# Patient Record
Sex: FEMALE | Race: OTHER | Employment: OTHER | URBAN - METROPOLITAN AREA
[De-identification: names, ages, dates, MRNs, and addresses within clinical notes are randomized per-mention and may not be internally consistent; named-entity substitution may affect disease eponyms.]

---

## 2017-03-04 ENCOUNTER — APPOINTMENT (OUTPATIENT)
Dept: GENERAL RADIOLOGY | Age: 77
End: 2017-03-04
Attending: PHYSICIAN ASSISTANT
Payer: MEDICARE

## 2017-03-04 ENCOUNTER — HOSPITAL ENCOUNTER (EMERGENCY)
Age: 77
Discharge: HOME | End: 2017-03-04
Attending: EMERGENCY MEDICINE
Payer: MEDICARE

## 2017-03-04 ENCOUNTER — APPOINTMENT (OUTPATIENT)
Dept: CT IMAGING | Age: 77
End: 2017-03-04
Attending: PHYSICIAN ASSISTANT
Payer: MEDICARE

## 2017-03-04 VITALS
OXYGEN SATURATION: 92 % | BODY MASS INDEX: 24.51 KG/M2 | SYSTOLIC BLOOD PRESSURE: 115 MMHG | HEART RATE: 74 BPM | WEIGHT: 129.8 LBS | DIASTOLIC BLOOD PRESSURE: 81 MMHG | HEIGHT: 61 IN | TEMPERATURE: 98.4 F | RESPIRATION RATE: 16 BRPM

## 2017-03-04 DIAGNOSIS — I62.9 INTRACRANIAL BLEED (HCC): Primary | ICD-10-CM

## 2017-03-04 LAB
ANION GAP BLD CALC-SCNC: 7 MMOL/L (ref 5–15)
APPEARANCE UR: CLEAR
APTT PPP: 26.4 SEC (ref 22.1–32.5)
BACTERIA URNS QL MICRO: ABNORMAL /HPF
BASOPHILS # BLD AUTO: 0 K/UL (ref 0–0.1)
BASOPHILS # BLD: 0 % (ref 0–1)
BILIRUB UR QL: NEGATIVE
BUN SERPL-MCNC: 23 MG/DL (ref 6–20)
BUN/CREAT SERPL: 23 (ref 12–20)
CALCIUM SERPL-MCNC: 9.9 MG/DL (ref 8.5–10.1)
CHLORIDE SERPL-SCNC: 99 MMOL/L (ref 97–108)
CO2 SERPL-SCNC: 33 MMOL/L (ref 21–32)
COLOR UR: ABNORMAL
CREAT SERPL-MCNC: 0.98 MG/DL (ref 0.55–1.02)
EOSINOPHIL # BLD: 0 K/UL (ref 0–0.4)
EOSINOPHIL NFR BLD: 1 % (ref 0–7)
EPITH CASTS URNS QL MICRO: ABNORMAL /LPF
ERYTHROCYTE [DISTWIDTH] IN BLOOD BY AUTOMATED COUNT: 13 % (ref 11.5–14.5)
GLUCOSE SERPL-MCNC: 95 MG/DL (ref 65–100)
GLUCOSE UR STRIP.AUTO-MCNC: NEGATIVE MG/DL
HCT VFR BLD AUTO: 35.2 % (ref 35–47)
HGB BLD-MCNC: 11.6 G/DL (ref 11.5–16)
HGB UR QL STRIP: NEGATIVE
INR PPP: 1.1 (ref 0.9–1.1)
KETONES UR QL STRIP.AUTO: NEGATIVE MG/DL
LEUKOCYTE ESTERASE UR QL STRIP.AUTO: NEGATIVE
LYMPHOCYTES # BLD AUTO: 20 % (ref 12–49)
LYMPHOCYTES # BLD: 1.3 K/UL (ref 0.8–3.5)
MCH RBC QN AUTO: 30.4 PG (ref 26–34)
MCHC RBC AUTO-ENTMCNC: 33 G/DL (ref 30–36.5)
MCV RBC AUTO: 92.4 FL (ref 80–99)
MONOCYTES # BLD: 0.3 K/UL (ref 0–1)
MONOCYTES NFR BLD AUTO: 5 % (ref 5–13)
NEUTS SEG # BLD: 4.6 K/UL (ref 1.8–8)
NEUTS SEG NFR BLD AUTO: 74 % (ref 32–75)
NITRITE UR QL STRIP.AUTO: NEGATIVE
PH UR STRIP: 8 [PH] (ref 5–8)
PLATELET # BLD AUTO: 344 K/UL (ref 150–400)
POTASSIUM SERPL-SCNC: 3.7 MMOL/L (ref 3.5–5.1)
PROT UR STRIP-MCNC: NEGATIVE MG/DL
PROTHROMBIN TIME: 11.2 SEC (ref 9–11.1)
RBC # BLD AUTO: 3.81 M/UL (ref 3.8–5.2)
RBC #/AREA URNS HPF: ABNORMAL /HPF (ref 0–5)
SODIUM SERPL-SCNC: 139 MMOL/L (ref 136–145)
SP GR UR REFRACTOMETRY: 1.02 (ref 1–1.03)
THERAPEUTIC RANGE,PTTT: NORMAL SECS (ref 58–77)
TROPONIN I SERPL-MCNC: <0.04 NG/ML
UA: UC IF INDICATED,UAUC: ABNORMAL
UROBILINOGEN UR QL STRIP.AUTO: 0.2 EU/DL (ref 0.2–1)
WBC # BLD AUTO: 6.3 K/UL (ref 3.6–11)
WBC URNS QL MICRO: ABNORMAL /HPF (ref 0–4)

## 2017-03-04 PROCEDURE — 80048 BASIC METABOLIC PNL TOTAL CA: CPT | Performed by: PHYSICIAN ASSISTANT

## 2017-03-04 PROCEDURE — 84484 ASSAY OF TROPONIN QUANT: CPT | Performed by: PHYSICIAN ASSISTANT

## 2017-03-04 PROCEDURE — 72125 CT NECK SPINE W/O DYE: CPT

## 2017-03-04 PROCEDURE — 87086 URINE CULTURE/COLONY COUNT: CPT | Performed by: PHYSICIAN ASSISTANT

## 2017-03-04 PROCEDURE — 99285 EMERGENCY DEPT VISIT HI MDM: CPT

## 2017-03-04 PROCEDURE — 36415 COLL VENOUS BLD VENIPUNCTURE: CPT | Performed by: PHYSICIAN ASSISTANT

## 2017-03-04 PROCEDURE — 70486 CT MAXILLOFACIAL W/O DYE: CPT

## 2017-03-04 PROCEDURE — 85610 PROTHROMBIN TIME: CPT | Performed by: PHYSICIAN ASSISTANT

## 2017-03-04 PROCEDURE — 71010 XR CHEST PORT: CPT

## 2017-03-04 PROCEDURE — 93005 ELECTROCARDIOGRAM TRACING: CPT

## 2017-03-04 PROCEDURE — 81001 URINALYSIS AUTO W/SCOPE: CPT | Performed by: PHYSICIAN ASSISTANT

## 2017-03-04 PROCEDURE — 72072 X-RAY EXAM THORAC SPINE 3VWS: CPT

## 2017-03-04 PROCEDURE — 85025 COMPLETE CBC W/AUTO DIFF WBC: CPT | Performed by: PHYSICIAN ASSISTANT

## 2017-03-04 PROCEDURE — 85730 THROMBOPLASTIN TIME PARTIAL: CPT | Performed by: PHYSICIAN ASSISTANT

## 2017-03-04 PROCEDURE — 70450 CT HEAD/BRAIN W/O DYE: CPT

## 2017-03-04 RX ORDER — SODIUM CHLORIDE 0.9 % (FLUSH) 0.9 %
5-10 SYRINGE (ML) INJECTION EVERY 8 HOURS
Status: DISCONTINUED | OUTPATIENT
Start: 2017-03-04 | End: 2017-03-04 | Stop reason: HOSPADM

## 2017-03-04 RX ORDER — ASCORBIC ACID 500 MG
500 TABLET ORAL DAILY
COMMUNITY
End: 2022-08-24

## 2017-03-04 RX ORDER — PAROXETINE 10 MG/1
20 TABLET, FILM COATED ORAL DAILY
COMMUNITY
End: 2022-08-24

## 2017-03-04 RX ORDER — SODIUM CHLORIDE 0.9 % (FLUSH) 0.9 %
5-10 SYRINGE (ML) INJECTION AS NEEDED
Status: DISCONTINUED | OUTPATIENT
Start: 2017-03-04 | End: 2017-03-04 | Stop reason: HOSPADM

## 2017-03-04 NOTE — ED NOTES
MD reviewed discharge instructions with the patient. The patient verbalized understanding. Patient ambulated out of ED accompanied by daughter, reporting being relieved of most intense pain.

## 2017-03-04 NOTE — H&P
[address] [place] [facility] [address]   HISTORY AND PHYSICAL       Name:  Michelle Rehman   MR#:  [id number]   :  1940   Account #:  [de-identified]        Date of Adm:  2017       PRIMARY CARE PHYSICIAN: Yo Zapata MD    SOURCE OF INFORMATION: The patient. CHIEF COMPLAINT: Fall. HISTORY OF PRESENT ILLNESS: This is a 75-year-old woman with   a past medical history significant for hypertension, dyslipidemia, type 2   diabetes, who was in her usual state of health until Thursday when the   patient fell at home. The patient stated that she tripped and fell. The   fall was accidental. She hit her head in the process of the fall. She also   stated that she passed out by the fall. The patient did not seek medical   treatment until today following the fall. Following the fall, the patient   developed neck pain as well as swelling around the left eye and left   side of the forehead. The neck pain is constant, it is persistent, 7/10 in   severity. It is worse with movement of the neck. No known relieving   factors. The patient was brought to the emergency room today for   further evaluation. When the patient arrived at the emergency room,   CT scan of the cervical spine was obtained and this was negative for   acute pathology, but the CT scan of the head shows a small left   subdural hematoma. The emergency room provider consulted the   neurosurgeon on-call. The neurosurgeon advised observation in the   hospital and to repeat the CT scan of the head tomorrow. The patient   was referred to the hospitalist service for placement on observation. The patient has no fever, no rigors, no chills. PAST MEDICAL HISTORY: Hypertension, type 2 diabetes,   dyslipidemia. ALLERGIES: NO KNOWN DRUG ALLERGIES. MEDICATIONS   1. Vitamin C 500 mg daily. 2. Calcium carbonate 600 mg daily. 3. Motrin 300 mg every 6 hours as needed for pain. 4. Glucophage 500 mg 3 times daily.    5. Paxil 10 mg daily. 6. Altace 2.5 mg daily. 6. Crestor 2.5 mg daily on Monday, Tuesday, Thursday and Friday. 7. Maxzide 37.5/25, 1 tablet daily. 8. Verapamil 180 mg daily. FAMILY HISTORY: This was reviewed. No family history of brain   aneurysm. PAST SURGICAL HISTORY: Not significant. SOCIAL HISTORY: No history of alcohol or tobacco abuse. REVIEW OF SYSTEMS   HEAD/EYES/EARS/NOSE/THROAT: This is positive for headache,   neck pain. No blurring of vision. No photophobia. RESPIRATORY: No cough, no shortness of breath, no hemoptysis. CARDIOVASCULAR: No chest pain, no orthopnea, no palpitation. GASTROINTESTINAL: No nausea or vomiting, no diarrhea, no   constipation. GENITOURINARY: No dysuria, no urgency and no frequency. All other systems are reviewed and they are negative. PHYSICAL EXAMINATION   GENERAL APPEARANCE: The patient appeared ill, in moderate   distress. VITAL SIGNS: On arrival at the emergency room, temperature 98.2,   pulse 70, respiratory rate 16, blood pressure 134/77, oxygen saturation   98% on room air. HEAD: Normocephalic. Left frontal hematoma and bruises around the   left eye are noted. Normal eye movement. No drainage, no discharge. EARS: Normal external ears with no obvious drainage. NOSE: No deformity, no drainage. MOUTH AND THROAT: No visible oral lesion. NECK: Supple. No JVD, no thyromegaly. CHEST: Clear breath sounds. No wheezing, no crackles. HEART: Normal S1 and S2, regular. No clinically appreciable murmur. ABDOMEN: Soft, nontender, normal bowel sounds. CENTRAL NERVOUS SYSTEM: Alert, oriented x3. No gross focal   neurological deficits. EXTREMITIES: No edema. Pulses 2+ bilaterally. MUSCULOSKELETAL: No obvious joint deformity or swelling. SKIN:   Left frontal hematoma bruises around the left eye noted. PSYCHIATRIC: Normal mood and affect. LYMPHATIC: No cervical lymphadenopathy. DIAGNOSTIC DATA: Chest x-ray negative.      X-ray of the thoracic spine negative for fracture. CT scan of the face shows a left frontal hematoma. CT scan of the cervical spine, no fracture. CT scan of the head shows small subdural hematoma. EKG shows normal sinus rhythm. No ST and T-wave abnormalities. LABORATORY DATA: Urinalysis: This is significant for negative   nitrites, negative leukocyte esterase, 1+ bacteria. CHEMISTRY:   Sodium 139, potassium 3.7, chloride 99, CO2 33, glucose 95, BUN 23,   creatinine 0.98, calcium 9.9. CARDIAC PROFILE: Troponin less than   0.04. INR 1.1, PT 11.2, PTT 26.4. HEMATOLOGY: WBC 6.3,   hemoglobin 11.6, hematocrit 35.2, platelet count 180. ASSESSMENT   1. Subdural hematoma. 2. Hypertension. 3. Dyslipidemia. 4. Bacteriuria. 5. Left frontal scalp hematoma. 6. Type 2 diabetes. 7. Fall at home. PLAN   1. Subdural hematoma. We will place the patient on observation as   advised by the neurosurgeon. We will repeat a CT scan of the head   tomorrow morning. We will await further recommendation from the   neurosurgeon. 2. Hypertension. We will resume her preadmission medication. We will   monitor the patient's blood pressure closely. 3. Dyslipidemia. We will resume her home medications. 4. Bacteriuria. We will await urine culture. The patient is asymptomatic. 5. Left frontal scalp hematoma. We will carry out supportive therapy   including ice pack for the swelling. 6. Type 2 diabetes. We will place the patient on sliding scale with   insulin coverage. We will check hemoglobin A1c level. We will hold oral   medications until the time of discharge. 7. Fall at home. We will obtain a CT scan of the pelvis to evaluate for   fracture. OTHER ISSUES: CODE STATUS: THE PATIENT IS A FULL CODE. We will request SCDs for DVT prophylaxis.          MD ESSIE Charles / Kartik   D:  03/04/2017   14:02   T:  03/04/2017   15:13   Job #:  [id number]

## 2017-03-04 NOTE — CONSULTS
[address] [place] [facility] [place] [address]   0 [facility]       Name:  Shanelle Llamas   MR#:  [id number]   :  1940   Account #:  [de-identified]    Date of Consultation:  2017   Date of Adm:  2017       REQUESTING PHYSICIAN: María Elena Min. Maricel Anguiano MD, Emergency Room. HISTORY OF PRESENT ILLNESS: This 77-year-old woman in her   usual state of otherwise, good health until 3 days ago when she had a   syncopal or near-syncopal episode, missed the last step on a   staircase, fell down and struck the left side of her head. When   the cephalohematoma or black and blue nathan that she had over the left   orbit became worse after 3 days, the family insisted that she come go   to the emergency room. Initially, she had refused. PAST MEDICAL HISTORY: Diabetes, diverticulitis,   hypercholesterolemia, hypertension, osteopenia. PAST SURGICAL HISTORY: She has had a cholecystectomy, ENT   surgery and colonoscopy. SOCIAL HISTORY: She is a former smoker. No alcohol use. ALLERGIES: NO KNOWN DRUG ALLERGIES. PHYSICAL EXAMINATION   GENERAL: She is awake and alert, oriented to person, place and time. Swiss is her primary language, but her daughter is here to help   interpret. VITAL SIGNS: Temperature 98.2, pulse rate 70, respiratory rate 16,   blood pressure when she was admitted was 155/122. It came down to 122/68, O2 saturation is 98%. NEUROLOGIC: She is awake, alert, oriented to person, place, and   time. Cranial nerve function 2 through 12 normal with a fair amount of   periorbital ecchymosis around the left eye. There is no evidence of   otorrhea or rhinorrhea. Face is symmetric. Palate rises equally. Tongue protrudes midline. She moves all 4 extremities equally. No   sensory deficits. No palpable tenderness in the posterior cervical   region.     IMAGING STUDIES: Include a CT scan of the brain that showed a tiny   amount of blood over the left convexity of the parietal lobe with no   mass effect, no hydrocephalus, no intracerebral injuries. IMPRESSION: Closed head injury. She fell 3 days ago. At this point, I   think it would be okay to send her home since the injury is 1days old   and she not gotten worse. The only reason she came to the hospital   today was because the black and blue nathan was getting worse and I   explained to the family that this is not unusual as these things tend to   look worse over time before they improve. I gave her my business   card. She knows to call the office for a followup visit in about 1-2   weeks. If she does get admitted, then I will follow as necessary, but   there is no neurosurgical intervention necessary at this time.         MD Krish Rowland / Johanny.Zaida   D:  03/04/2017   13:50   T:  03/04/2017   14:53   Job #:  [id number]

## 2017-03-04 NOTE — DISCHARGE INSTRUCTIONS
We hope that we have addressed all of your medical concerns. The examination and treatment you received in the Emergency Department were for an emergent problem and were not intended as complete care. It is important that you follow up with your healthcare provider(s) for ongoing care. If your symptoms worsen or do not improve as expected, and you are unable to reach your usual health care provider(s), you should return to the Emergency Department. Today's healthcare is undergoing tremendous change, and patient satisfaction surveys are one of the many tools to assess the quality of medical care. You may receive a survey from the [employer] regarding your experience in the Emergency Department. I hope that your experience has been completely positive, particularly the medical care that I provided. As such, please participate in the survey; anything less than excellent does not meet my expectations or intentions. [facility] and 8 [facility] participate in nationally recognized quality of care measures. If your blood pressure is greater than 120/80, as reported below, we urge that you seek medical care to address the potential of high blood pressure, commonly known as hypertension. Hypertension can be hereditary or can be caused by certain medical conditions, pain, stress, or \"white coat syndrome. \"       Please make an appointment with your health care provider(s) for follow up of your Emergency Department visit. VITALS:   Patient Vitals for the past 8 hrs:   Temp Pulse Resp BP SpO2   03/04/17 1315 - - - 122/68 96 %   03/04/17 1300 - - - 129/55 97 %   03/04/17 1230 - - - 137/77 -   03/04/17 1145 - - - 152/66 95 %   03/04/17 1130 - - - 131/88 (!) 89 %   03/04/17 1124 - - - 155/83 -   03/04/17 1003 98.2 °F (36.8 °C) 70 16 134/77 98 %          Thank you for allowing us to provide you with medical care today.   We realize that you have many choices for your emergency care needs. Please choose us in the future for any continued health care needs. Chago Yates Michelle [facility], [facility].   Office: [phone]            Recent Results (from the past 24 hour(s))   CBC WITH AUTOMATED DIFF    Collection Time: 03/04/17 12:34 PM   Result Value Ref Range    WBC 6.3 3.6 - 11.0 K/uL    RBC 3.81 3.80 - 5.20 M/uL    HGB 11.6 11.5 - 16.0 g/dL    HCT 35.2 35.0 - 47.0 %    MCV 92.4 80.0 - 99.0 FL    MCH 30.4 26.0 - 34.0 PG    MCHC 33.0 30.0 - 36.5 g/dL    RDW 13.0 11.5 - 14.5 %    PLATELET 724 736 - 228 K/uL    NEUTROPHILS 74 32 - 75 %    LYMPHOCYTES 20 12 - 49 %    MONOCYTES 5 5 - 13 %    EOSINOPHILS 1 0 - 7 %    BASOPHILS 0 0 - 1 %    ABS. NEUTROPHILS 4.6 1.8 - 8.0 K/UL    ABS. LYMPHOCYTES 1.3 0.8 - 3.5 K/UL    ABS. MONOCYTES 0.3 0.0 - 1.0 K/UL    ABS. EOSINOPHILS 0.0 0.0 - 0.4 K/UL    ABS.  BASOPHILS 0.0 0.0 - 0.1 K/UL   METABOLIC PANEL, BASIC    Collection Time: 03/04/17 12:34 PM   Result Value Ref Range    Sodium 139 136 - 145 mmol/L    Potassium 3.7 3.5 - 5.1 mmol/L    Chloride 99 97 - 108 mmol/L    CO2 33 (H) 21 - 32 mmol/L    Anion gap 7 5 - 15 mmol/L    Glucose 95 65 - 100 mg/dL    BUN 23 (H) 6 - 20 MG/DL    Creatinine 0.98 0.55 - 1.02 MG/DL    BUN/Creatinine ratio 23 (H) 12 - 20      GFR est AA >60 >60 ml/min/1.73m2    GFR est non-AA 55 (L) >60 ml/min/1.73m2    Calcium 9.9 8.5 - 10.1 MG/DL   URINALYSIS W/ REFLEX CULTURE    Collection Time: 03/04/17 12:34 PM   Result Value Ref Range    Color YELLOW/STRAW      Appearance CLEAR CLEAR      Specific gravity 1.019 1.003 - 1.030      pH (UA) 8.0 5.0 - 8.0      Protein NEGATIVE  NEG mg/dL    Glucose NEGATIVE  NEG mg/dL    Ketone NEGATIVE  NEG mg/dL    Bilirubin NEGATIVE  NEG      Blood NEGATIVE  NEG      Urobilinogen 0.2 0.2 - 1.0 EU/dL    Nitrites NEGATIVE  NEG      Leukocyte Esterase NEGATIVE  NEG      WBC 0-4 0 - 4 /hpf    RBC 0-5 0 - 5 /hpf    Epithelial cells FEW FEW /lpf    Bacteria 1+ (A) NEG /hpf    UA:UC IF INDICATED URINE CULTURE ORDERED (A) CNI     PROTHROMBIN TIME + INR    Collection Time: 03/04/17 12:34 PM   Result Value Ref Range    INR 1.1 0.9 - 1.1      Prothrombin time 11.2 (H) 9.0 - 11.1 sec   PTT    Collection Time: 03/04/17 12:34 PM   Result Value Ref Range    aPTT 26.4 22.1 - 32.5 sec    aPTT, therapeutic range     58.0 - 77.0 SECS   TROPONIN I    Collection Time: 03/04/17 12:34 PM   Result Value Ref Range    Troponin-I, Qt. <0.04 <0.05 ng/mL   EKG, 12 LEAD, INITIAL    Collection Time: 03/04/17 12:41 PM   Result Value Ref Range    Ventricular Rate 72 BPM    Atrial Rate 72 BPM    P-R Interval 176 ms    QRS Duration 64 ms    Q-T Interval 376 ms    QTC Calculation (Bezet) 411 ms    Calculated P Axis 33 degrees    Calculated R Axis 16 degrees    Calculated T Axis 25 degrees    Diagnosis       Normal sinus rhythm  When compared with ECG of 05-OCT-2010 15:15,  No significant change was found         Xr Spine Thorac 3 V    Result Date: 3/4/2017  CLINICAL HISTORY:  Trauma, back pain evaluate for compression fracture INDICATION:  back pain FINDINGS: Three views of the thoracic spine are obtained. The vertebral bodies are anatomically aligned. Visualized osseous structures are without evidence of fracture-dislocation. There is no significant soft tissue abnormality identified. Minimal multilevel loss of disc height. Status post cholecystectomy. IMPRESSION: No fracture. Ct Head Wo Cont    Result Date: 3/4/2017  EXAM:  CT HEAD WO CONT INDICATION:   fall pain injury COMPARISON: None. TECHNIQUE: Unenhanced CT of the head was performed using 5 mm images. Brain and bone windows were generated. CT dose reduction was achieved through use of a standardized protocol tailored for this examination and automatic exposure control for dose modulation. FINDINGS: The ventricles and sulci are normal in size, shape and configuration and midline.  There is no significant white matter disease. There is a 7 mm area of hyperdensity along the left parietal lobe measuring 7 mm on series 2 image 19 and coronal image 54. There is no other intracranial hemorrhage, mass, mass effect or midline shift. The basilar cisterns are open. No acute infarct is identified. The bone windows demonstrate no abnormalities. The visualized portions of the paranasal sinuses and mastoid air cells are clear. There is a left frontal scalp hematoma. IMPRESSION: 1. Left frontal scalp hematoma. 2. Small focus of extra-axial hemorrhage adjacent to the left parietal lobe. The findings were called to Dr. Dario Lai on 3/4/2017 at 21 [phone] by myself. [address]    Result Date: 3/4/2017  EXAM:  CT MAXILLOFACIAL WO CONT INDICATION:   fall with pain COMPARISON:  None. CONTRAST:   None. TECHNIQUE:  Multislice helical CT of the facial bones was performed in the axial plane without intravenous contrast administration. Coronal and sagittal reformations were generated. CT dose reduction was achieved through use of a standardized protocol tailored for this examination and automatic exposure control for dose modulation. FINDINGS: There is a mild left frontal scalp hematoma. No acute fracture or dislocation. Degenerative changes are seen in the cervical spine. The visualized paranasal sinuses and mastoid air cells are clear. The globes, optic nerves and extraocular muscles are normal. No abnormalities are identified within the visualized portions of the brain or nasopharynx. Hearing aids are in place. IMPRESSION: Mild left frontal scalp hematoma     Ct Spine Cerv Wo Cont    Result Date: 3/4/2017  EXAM:  CT CERVICAL SPINE WITHOUT CONTRAST INDICATION:   fall with neck pain. COMPARISON: None. TECHNIQUE: Multislice helical CT of the cervical spine was performed without intravenous contrast administration. Sagittal and coronal reconstructions were generated.   CT dose reduction was achieved through use of a standardized protocol tailored for this examination and automatic exposure control for dose modulation. CONTRAST: None. FINDINGS: The alignment is within normal limits. There is no fracture or subluxation. The odontoid process is intact. The craniocervical junction is within normal limits. The prevertebral soft tissues are within normal limits. C2-C3:  There is no spinal canal or neural foraminal stenosis. C3-C4:  Mild disc space narrowing. Severe left neural foraminal narrowing. No right neural foraminal narrowing or spinal stenosis. C4-C5:  Moderate disc space narrowing. Mild broad-based disc osteophyte complex causing mild spinal stenosis. Moderate left and mild right neural foraminal narrowing. C5-C6:  Moderate disc space narrowing. Mild broad-based disc osteophyte complex causing mild spinal stenosis. Moderate bilateral neural foraminal narrowing. C6-C7: Moderate disc space narrowing. Mild right neural foraminal narrowing. C7-T1: There is no spinal canal or neural foraminal narrowing. IMPRESSION: Multilevel spondylosis. No acute fracture or subluxation. Xr Chest Port    Result Date: 3/4/2017  Chest portable AP History: Chest pain. Fall. Comparison: 10/5/2010 Findings: The lungs are well expanded. No focal consolidation, pleural effusion, or pneumothorax. The cardiomediastinal silhouette is unremarkable. The visualized osseous structures are unremarkable. Impression: No acute cardiopulmonary process.

## 2017-03-04 NOTE — PROGRESS NOTES
Full note dictated  Tiny amt of blood over left parietal convexity   No surgery needed  Repeat CT tomorrow, will follow as needed

## 2017-03-04 NOTE — ED TRIAGE NOTES
Triage note: Pt states she tripped and fell into the wall and hit her head. Pt states she passed out after the fall. Pt complains of neck pain and has bruising to the left eye and forehead. Pt fell on Thursday. C-collar placed on pt in triage.

## 2017-03-04 NOTE — PROGRESS NOTES
Prior to Admission Medications   Prescriptions Last Dose Informant Patient Reported? Taking? CALCIUM CARBONATE/VITAMIN D3 (CALTRATE 600 + D PO) 3/4/2017 at 0830  Yes Yes   Sig: Take 600 mg by mouth daily. HK-GP-PI-Fe-Min-Lycopen-Lutein (CENTRUM) 0.4-162-18 mg Tab Not Taking at Unknown time  Yes No   Sig: Take  by mouth. PARoxetine (PAXIL) 10 mg tablet 3/4/2017 at 0830  Yes Yes   Sig: Take 10 mg by mouth daily. ascorbic acid, vitamin C, (VITAMIN C) 500 mg tablet 3/4/2017 at 0830  Yes Yes   Sig: Take 500 mg by mouth daily. ibuprofen (ADVIL) 200 mg tablet 2/25/2017 at Unknown time  Yes Yes   Sig: Take 200 mg by mouth every six (6) hours as needed for Pain.   metFORMIN (GLUCOPHAGE) 500 mg tablet 3/4/2017 at 0830  No Yes   Sig: take 1 tablet by mouth three times a day with food   ramipril (ALTACE) 2.5 mg capsule 3/4/2017 at 0830  Yes Yes   Sig: Take  by mouth daily. rosuvastatin (CRESTOR) 5 mg tablet 2/25/2017 at Unknown time  Yes Yes   Sig: Take 2.5 mg by mouth every Monday, Tuesday, Thursday, Saturday. triamterene-hydrochlorothiazide (MAXZIDE) 37.5-25 mg per tablet 3/4/2017 at 0830  No Yes   Sig: take 1 tablet by mouth once daily   verapamil CR (VERELAN) 180 mg CR capsule 3/4/2017 at 0830  No Yes   Sig: Take 1 Cap by mouth daily. Facility-Administered Medications: None   Self: List updated per patient interview. Also call to patients pharmacy for paxil dosage. Paxil and vitamin c added. Aleve removed.

## 2017-03-05 LAB
ATRIAL RATE: 72 BPM
BACTERIA SPEC CULT: NORMAL
CALCULATED P AXIS, ECG09: 33 DEGREES
CALCULATED R AXIS, ECG10: 16 DEGREES
CALCULATED T AXIS, ECG11: 25 DEGREES
CC UR VC: NORMAL
DIAGNOSIS, 93000: NORMAL
P-R INTERVAL, ECG05: 176 MS
Q-T INTERVAL, ECG07: 376 MS
QRS DURATION, ECG06: 64 MS
QTC CALCULATION (BEZET), ECG08: 411 MS
SERVICE CMNT-IMP: NORMAL
VENTRICULAR RATE, ECG03: 72 BPM

## 2017-03-05 NOTE — ED PROVIDER NOTES
Patient is a 64 y.o. female presenting with fall, head injury, and neck pain. The history is provided by the patient. The history is limited by a language barrier. A  was used (daughter, who reports she interprets for Cherl Grain, She and mother are comfortable with use of her as ). Fall   Incident onset: Thursday night, 1.5 days ago. Fall occurred: while going down carpeted steps, Distance fallen: 3 steps. Impact surface: flat wall. There was no blood loss. Point of impact: left forehead. Pain location: frontal head, c-spine and upper thoracic. The pain is mild. She was ambulatory at the scene. There was no entrapment after the fall. There was no drug use involved in the accident. There was no alcohol use involved in the accident. Associated symptoms include nausea and loss of consciousness. Pertinent negatives include no visual change, no fever, no numbness, no abdominal pain, no bowel incontinence, no vomiting, no hematuria, no headaches, no extremity weakness, no hearing loss, no tingling and no laceration. The risk factors include being elderly. Exacerbated by: moving back/neck. She has tried NSAIDs and acetaminophen for the symptoms. The treatment provided no relief. The patient's last tetanus shot was less than 5 years (before going to [place]) ago. Head Injury    Pertinent negatives include no numbness, no vomiting and no weakness. Neck Pain    Pertinent negatives include no photophobia, no visual change, no chest pain, no numbness, no headaches, no bowel incontinence, no tingling and no weakness. Pt reports having a witnessed fall by her grandchildren's , she states she was going down carpeted steps when she tripped due to her slippers (per  report) and fell down 3 steps, striking left front of face/head to wall with LOC for 5-6 seconds.  Since she's had minimal nausea, no vomiting, no mentation/hearing changes (pt is [place]), no extremity weakness/numbness/loss of strength or ROM. No urinary symptoms, chest pain, sob, abdominal pain. No other concerns. Past Medical History:   Diagnosis Date    Diabetes ([address]     Diverticulitis of colon     Hypercholesterolemia     Hypertension     Osteopenia        Past Surgical History:   Procedure Laterality Date    ENDOSCOPY, COLON, DIAGNOSTIC  2012    small polyp    HX CHOLECYSTECTOMY      HX GI  2012    EGD gastric polyp    HX HEENT  5/2003    cat. ext. O.S. - bilateral         Family History:   Problem Relation Age of Onset    Kidney Disease Mother      renal failure    Stroke Mother     Heart Disease Mother     Hypertension Mother     Lung Disease Father        Social History     Social History    Marital status:      Spouse name: N/A    Number of children: N/A    Years of education: N/A     Occupational History    Not on file. Social History Main Topics    Smoking status: Former Smoker    Smokeless tobacco: Not on file      Comment: former cigarette smoker - quit 45 yrs ago    Alcohol use Yes      Comment: rarely    Drug use: Not on file    Sexual activity: Not on file     Other Topics Concern    Not on file     Social History Narrative         ALLERGIES: Review of patient's allergies indicates no known allergies. Review of Systems   Constitutional: Negative for appetite change, chills, diaphoresis, fatigue and fever. HENT: Negative. Negative for congestion, dental problem, ear discharge, ear pain, nosebleeds, rhinorrhea, sinus pressure, sore throat and trouble swallowing. Eyes: Negative for photophobia, pain, redness, itching and visual disturbance. Respiratory: Negative for cough, chest tightness and shortness of breath. Cardiovascular: Negative for chest pain, palpitations and leg swelling. Gastrointestinal: Positive for nausea. Negative for abdominal pain, bowel incontinence, diarrhea and vomiting. Genitourinary: Negative for difficulty urinating and hematuria. Musculoskeletal: Positive for back pain and neck pain. Negative for extremity weakness. Skin: Positive for color change. Neurological: Positive for loss of consciousness. Negative for dizziness, tingling, tremors, seizures, syncope, facial asymmetry, speech difficulty, weakness, light-headedness, numbness and headaches. Hematological: Negative. Psychiatric/Behavioral: Negative for agitation, behavioral problems and decreased concentration. All other systems reviewed and are negative. Vitals:    03/04/17 1230 03/04/17 1300 03/04/17 1315 03/04/17 1345   BP: 137/77 129/55 122/68 115/81   Pulse:    74   Resp:    16   Temp:    98.4 °F (36.9 °C)   SpO2:  97% 96% 92%   Weight:       Height:                Physical Exam   Constitutional: She is oriented to person, place, and time. She appears well-developed and well-nourished. No distress. 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 Right Ear: Tympanic membrane and ear canal normal.   Left Ear: Tympanic membrane and ear canal normal.   Nose: Nose normal. No nasal deformity, septal deviation or nasal septal hematoma. No epistaxis. Mouth/Throat: Uvula is midline, oropharynx is clear and moist and mucous membranes are normal.   Eyes: EOM are normal. Right eye exhibits no discharge. Left eye exhibits no discharge. Right conjunctiva is not injected. Right conjunctiva has no hemorrhage. Left conjunctiva is not injected. Left conjunctiva has no hemorrhage. Pupils equal and reactive to light but left questionable more sluggish, otherwise normal. No hyphema. Neck: Normal range of motion. Neck supple. No thyromegaly present. Cardiovascular: Normal rate, regular rhythm and normal heart sounds. Pulmonary/Chest: Effort normal and breath sounds normal. No stridor. No respiratory distress. She has no wheezes. Abdominal: Soft. She exhibits no distension. There is no tenderness.  There is no rebound and no guarding. Musculoskeletal:        Cervical back: She exhibits tenderness, bony tenderness and pain. She exhibits normal range of motion, no edema and no deformity. Thoracic back: She exhibits tenderness and pain. She exhibits normal range of motion and no bony tenderness. Pain and ttp along C7, then paraspinal ttp R>L through mid thoracic. BLE/BUE with grossly normal ROM/strength/sensation. Pt ambulates self easily. Lymphadenopathy:     She has no cervical adenopathy. Neurological: She is alert and oriented to person, place, and time. No cranial nerve deficit. She exhibits normal muscle tone. Coordination normal.   Skin: Skin is warm and dry. No laceration noted. She is not diaphoretic. Psychiatric: She has a normal mood and affect. Her behavior is normal.   Nursing note and vitals reviewed. [facility]  ED Course       Procedures    Discussed patient including complaint, history, physical exam, test results and diagnosis and plan including treatment with attending physician. Attending agrees with care and plan. Will call neurosurgery. Neurosurgery states not surgical but would recommend hospitalist admission with neurosurg consult. Additional imaging/labs have been ordered for [facility] agrees to neurosurg recommendations, will admit. Neurosurg feels pt is doing very well 1.5 days after fall and okay with d/c. Family and hospitalist aware and in agreement. Pt will f/u with neurosurg per his recommendations. Patient has been reevaluated and is ready for D/C. The results have been reviewed with them. Patient and/or family have verbally conveyed their understanding and agreement of the patient's signs, symptoms, diagnosis, treatment and prognosis and additionally agree to follow up as recommended or return to the Emergency Room should their condition change prior to follow-up.   Discharge instructions have also been provided to the patient with some educational information regarding their diagnosis as well a list of reasons why they would want to return to the ER prior to their follow-up appointment should their condition change.

## 2017-03-23 ENCOUNTER — HOSPITAL ENCOUNTER (OUTPATIENT)
Dept: CT IMAGING | Age: 77
Discharge: HOME | End: 2017-03-23
Attending: NEUROLOGICAL SURGERY
Payer: MEDICARE

## 2017-03-23 DIAGNOSIS — S09.90XA CLOSED HEAD INJURY: ICD-10-CM

## 2017-03-23 PROCEDURE — 70450 CT HEAD/BRAIN W/O DYE: CPT

## 2017-03-28 ENCOUNTER — HOSPITAL ENCOUNTER (OUTPATIENT)
Dept: PHYSICAL THERAPY | Age: 77
Discharge: HOME | End: 2017-03-28
Payer: MEDICARE

## 2017-03-28 PROCEDURE — 97140 MANUAL THERAPY 1/> REGIONS: CPT | Performed by: PHYSICAL THERAPIST

## 2017-03-28 PROCEDURE — 97161 PT EVAL LOW COMPLEX 20 MIN: CPT | Performed by: PHYSICAL THERAPIST

## 2017-03-28 PROCEDURE — G8984 CARRY CURRENT STATUS: HCPCS | Performed by: PHYSICAL THERAPIST

## 2017-03-28 PROCEDURE — 97110 THERAPEUTIC EXERCISES: CPT | Performed by: PHYSICAL THERAPIST

## 2017-03-28 PROCEDURE — G8985 CARRY GOAL STATUS: HCPCS | Performed by: PHYSICAL THERAPIST

## 2017-03-28 NOTE — PROGRESS NOTES
[facility]  [address]  [place], [address]  Phone: [phone]  Fax: [phone]    Plan of Care/Statement of Necessity for Physical Therapy Services  2-15    Patient name: Evelin Tee  : 1940  Provider#: [id number]  Referral source: Sona Watkins MD      Medical/Treatment Diagnosis: Neck pain [M54.2]     Prior Hospitalization: see medical history     Comorbidities: see evaluation  Prior Level of Function: see evaluation  Medications: Verified on Patient Summary List  Start of Care: 3/28/17      Onset Date: 3/9/17   The Plan of Care and following information is based on the information from the initial evaluation. Assessment/ key information: Pt is a 64year old female presenting with signs and symptoms consistent with cervical strain s/p fall on 3/9/17.     Evaluation Complexity History MEDIUM  Complexity : 1-2 comorbidities / personal factors will impact the outcome/ POC ; Examination LOW Complexity : 1-2 Standardized tests and measures addressing body structure, function, activity limitation and / or participation in recreation  ;Presentation LOW Complexity : Stable, uncomplicated  ;Clinical Decision Making LOW Complexity : FOTO score of   Overall Complexity Rating: LOW     Problem List: pain affecting function, decrease ROM, decrease strength, impaired gait/ balance, decrease ADL/ functional abilitiies, decrease activity tolerance, decrease flexibility/ joint mobility and decrease transfer abilities   Treatment Plan may include any combination of the following: Therapeutic exercise, Therapeutic activities, Neuromuscular re-education, Physical agent/modality, Manual therapy, Patient education, Self Care training and Functional mobility training  Patient / Family readiness to learn indicated by: asking questions, trying to perform skills and interest  Persons(s) to be included in education: patient (P)  Barriers to Learning/Limitations: None  Patient Goal (s): see evaluation  Patient Self Reported Health Status: good  Rehabilitation Potential: good    Short Term Goals: To be accomplished in 2-3 weeks:  1) Pt will be independent with initial HEP  2) Pt will report 25% decrease in exacerbation of symptoms  3) Pt will report use of ice at home to decrease inflammation/pain    Long Term Goals: To be accomplished in 8-12 weeks:  1) Pt will perform resisted shoulder flex, scap without pain in order to lift grocery bags  2) Pt will report being able to lift grandchildren without pain in order to return to prior level of function  3) Pt will perform neck AROM all directions without pain in order to return to prior level of function    Frequency / Duration: Patient to be seen 1-2 times per week for 8-12 weeks. Patient/ Caregiver education and instruction: self care, activity modification and exercises    [x]  Plan of care has been reviewed with [facility]    G-Codes (GP)  Carry   Current  CJ= 20-39%    Goal  CJ= 20-39%      The severity rating is based on clinical judgment and the FOTO Score score. Certification Period: 3/28/17- 6/19/17  Pooja Pineda, PT 3/28/2017 8:34 AM    ________________________________________________________________________    I certify that the above Therapy Services are being furnished while the patient is under my care. I agree with the treatment plan and certify that this therapy is necessary.     [de-identified] Signature:____________________  Date:____________Time: _________

## 2017-03-28 NOTE — PROGRESS NOTES
[facility]  [address], [place], [address]  Phone: [phone]  Fax: [phone]    PT INITIAL EVALUATION NOTE - [facility] 2-15    Patient Name: Philippe Parker  Date:3/28/2017  : 1940  [x]  Patient  Verified  Payor: Anthony Ee / Plan: [place] MEDICARE PART A & B / Product Type: Medicare /    In time:8:30 AM  Out time:9:30 AM  Total Treatment Time (min): 60  Total Timed Codes (min): 25  1:1 Treatment Time ( [address] only): 50   Visit #: 1     Treatment Area: Neck pain [M54.2]    Subjective: Any medication changes, allergies to medications, adverse drug reactions, diagnosis change, or new procedure performed?: [] No    [x] Yes (see summary    Date of onset/injury:   Pt presents with neck pain. She fell down the stairs on 3/9/17 and hit her head- she reports losing consciousness. She went to hospital and had CT scan- per CC: 1. Left frontal scalp hematoma 2. Small focus of extra-axial hemorrhage adjacent to the left parietal lobe. She denies onset of dizziness or memory loss since the fall. She denies headaches, however reports \"pain\" in the back of her head. She has pain in neck, both shoulders. Denies UE numbness or tingling. No history of neck pain prior to falling. Most recent CT scan on 3/18/17, per CC: Resolving left frontal scalp hematoma and small left subdural hematoma. Pain:   10/10 max 5/10 min 6/10 now       Aggravated by: lifting, moving head   Eased by: Advil, massage    Location and description of symptoms: pain in neck, back of head    Diagnostic Tests: [] Lab work [] X-rays    [x] CT [] MRI     [] Other:    Social/Recreation/Work: 2 grandchildren- twins 1years old. Enjoys playing with her grandchildren, gardening, bridge. She is from the [facility]. Prior level of function: able to lift, carry, move head without any pain    Patient goal(s): \"no more pain\"     PMH: Significant for Diabetes type II    Headaches: Do you have headaches? [] Yes   [x] No  (\"pain\" in back of head)    General Health:  Red Flags Indicated?  [] Yes    [x] No  [] Yes [x] No Dizziness   [] Yes [x] No Blurred vision or diplopia  [] Yes [x] No Facial Numbness or paresthesia  [] Yes [x] No Difficulty swallowing or with speech  [] Yes [x] No Drop Attacks  [] Yes [x] No History of Cancer  [] Yes [x] No Change in balance or gait    Objective:      Observation/posture:   Pt with bruising noted inferior to left eye; visible swelling over front left forehead     Active Movements:   AROM Degrees Comments:pain, area   Forward flexion 35 Pain in back of neck   Extension 51 Pain in back of neck   Rotation right 75 Minimal pain on R   Rotation left 75 Pain L>R   SB right 25 Pain L>R   SB left 28 Pain R>L     UE AROM: B shoulder flexion WNL    Strength (Upper Extremities):   L(0-5) R (0-5) Comments   Shoulder Flexion 4+ 4+ Pain   Shoulder Scapion 4+ 4+ Pain   Elbow Flexion  4+ 4+    Elbow Extension  4+ 4+        Palpation:  Severe TTP B UT, LS, suboccipitals  Mod TTP B cervical paraspinals    Special Tests:  Cervical:        Spurling's:   R, L Neg       Compression:  Neg      Muscle Flexibility:    Upper Trap: [] WNL    [x] Tight    [x] R    [x] L   Levator: [] WNL    [x] Tight    [x] R    [x] L    Outcome Measure: Patient presents with a FOTO Score of  79.      10 min Therapeutic Exercise:  [x] See flow sheet : cervical AROM exercises   Rationale: increase ROM to improve the patients ability to drive    15 min Manual Therapy:   STM B UT, LS, cervical paraspinals  Suboccipital release x3    Rationale: decrease pain, increase ROM and increase tissue extensibility to improve the patients ability to lift    10 min- ice- cervical spine  Supine, LE's elevated          With   [] TE   [] TA   [] neuro   [] other: Patient Education: [x] Review HEP    [] Progressed/Changed HEP based on:   [] positioning   [] body mechanics   [] transfers   [] heat/ice application    [] other:      Pain Level (0-10 scale) post treatment: Not reported    Assessment:  [x] See POC  [] Other:    Plan:   [x] See Jeevan Monroy PT DPT     3/28/2017  8:34 AM

## 2017-03-31 ENCOUNTER — HOSPITAL ENCOUNTER (OUTPATIENT)
Dept: PHYSICAL THERAPY | Age: 77
Discharge: HOME | End: 2017-03-31
Payer: MEDICARE

## 2017-03-31 PROCEDURE — 97110 THERAPEUTIC EXERCISES: CPT | Performed by: PHYSICAL THERAPIST

## 2017-03-31 PROCEDURE — 97140 MANUAL THERAPY 1/> REGIONS: CPT | Performed by: PHYSICAL THERAPIST

## 2017-03-31 NOTE — PROGRESS NOTES
PT DAILY TREATMENT NOTE - [facility] 2-15    Patient Name: Chaya Canales  Date:3/31/2017  : 1940  [x]  Patient  Verified  Payor: VA MEDICARE / Plan: VA MEDICARE PART A & B / Product Type: Medicare /    In time: 8:55 AM  Out time:9:50 AM  Total Treatment Time (min): 55  Total Timed Codes (min): 45  1:1 Treatment Time ( [address] only): 30   Visit #: 2     Treatment Area: Neck pain [M54.2]    SUBJECTIVE  Pain Level (0-10 scale): 0/10  Any medication changes, allergies to medications, adverse drug reactions, diagnosis change, or new procedure performed?: [x] No    [] Yes (see summary sheet for update)  Subjective functional status/changes:   [] No changes reported  Pt reports the exercises have been making her feel better.  Mostly neck pain today- \"the pain in my head is less\"    OBJECTIVE    Modality rationale: decrease inflammation, decrease pain and increase tissue extensibility to improve the patients ability to play with grandchildren   Min Type Additional Details    [] Estim: []Att   []Unatt        []TENS instruct                  []IFC  []Premod   []NMES                     []Other:  []w/US   []w/ice   []w/heat  Position:  Location:    []  Traction: [] Cervical       []Lumbar                       [] Prone          []Supine                       []Intermittent   []Continuous Lbs:  [] before manual  [] after manual  []w/heat    []  Ultrasound: []Continuous   [] Pulsed at:                           []1MHz   []3MHz Location:  W/cm2:    [] Paraffin         Location:   []w/heat   10 [x]  Ice     []  Heat  []  Ice massage Position: supine, LE's elevated  Location: cervical     []  Laser  []  Other: Position:  Location:      []  Vasopneumatic Device Pressure:       [] lo [] med [] hi   Temperature:      [x] Skin assessment post-treatment:  [x]intact []redness- no adverse reaction    []redness  adverse reaction:     30 min Therapeutic Exercise:  [] See flow sheet :   Rationale: increase ROM and increase strength to improve the patients ability to perform ADL's      15 min Manual Therapy: STM B UT, B LS, cervical paraspinals  Suboccipital release x4  B manual UT stretch    Rationale: decrease pain, increase ROM and increase tissue extensibility to improve the patients ability to exercise            With   [] TE   [] TA   [] neuro   [] other: Patient Education: [x] Review HEP    [] Progressed/Changed HEP based on:   [] positioning   [] body mechanics   [] transfers   [] heat/ice application    [] other:      Other Objective/Functional Measures: n/a     Pain Level (0-10 scale) post treatment: 0/10    ASSESSMENT/Changes in Function:     Reviewed HEP. Pt reported decreased symptoms after manual therapy. Tolerated addition of new exercises well, challenged with B ER with zuleyka. Patient will continue to benefit from skilled PT services to modify and progress therapeutic interventions, address functional mobility deficits, address ROM deficits, address strength deficits, analyze and address soft tissue restrictions, analyze and cue movement patterns, analyze and modify body mechanics/ergonomics and assess and modify postural abnormalities to attain remaining goals.      [x]  See Plan of Care  []  See progress note/recertification  []  See Discharge Summary         Progress towards goals / Updated goals:  nt    PLAN  []  Upgrade activities as tolerated     [x]  Continue plan of care  []  Update interventions per flow sheet       []  Discharge due to:_  []  Other:_      Poly Mckeon PT 3/31/2017  9:22 AM

## 2017-04-04 ENCOUNTER — HOSPITAL ENCOUNTER (OUTPATIENT)
Dept: PHYSICAL THERAPY | Age: 77
Discharge: HOME | End: 2017-04-04
Payer: MEDICARE

## 2017-04-04 PROCEDURE — 97140 MANUAL THERAPY 1/> REGIONS: CPT | Performed by: PHYSICAL THERAPIST

## 2017-04-04 PROCEDURE — 97110 THERAPEUTIC EXERCISES: CPT | Performed by: PHYSICAL THERAPIST

## 2017-04-06 ENCOUNTER — HOSPITAL ENCOUNTER (OUTPATIENT)
Dept: PHYSICAL THERAPY | Age: 77
Discharge: HOME | End: 2017-04-06
Payer: MEDICARE

## 2017-04-06 PROCEDURE — 97110 THERAPEUTIC EXERCISES: CPT | Performed by: PHYSICAL THERAPIST

## 2017-04-06 PROCEDURE — 97140 MANUAL THERAPY 1/> REGIONS: CPT | Performed by: PHYSICAL THERAPIST

## 2017-04-06 NOTE — PROGRESS NOTES
PT DAILY TREATMENT NOTE - [facility] 2-15    Patient Name: Jolene Gilbert  Date:2017  : 1940  [x]  Patient  Verified  Payor: VA MEDICARE / Plan: [place] MEDICARE PART A & B / Product Type: Medicare /    In time: 2:00 PM  Out time: 3:00 PM  Total Treatment Time (min): 60  Total Timed Codes (min): 50  1:1 Treatment Time ( only): 40   Visit #: 4    Treatment Area: Neck pain [M54.2]    SUBJECTIVE  Pain Level (0-10 scale): 810  Any medication changes, allergies to medications, adverse drug reactions, diagnosis change, or new procedure performed?: [x] No    [] Yes (see summary sheet for update)  Subjective functional status/changes:   [] No changes reported  Pt reports most pain on left side and up in the back of her head today. \"It feels good in the morning and then in the afternoon it really hurts.  I have to take advil and lay down and then it feels better\"    OBJECTIVE    Modality rationale: decrease inflammation, decrease pain and increase tissue extensibility to improve the patients ability to play with grandchildren   Min Type Additional Details    [] Estim: []Att   []Unatt        []TENS instruct                  []IFC  []Premod   []NMES                     []Other:  []w/US   []w/ice   []w/heat  Position:  Location:    []  Traction: [] Cervical       []Lumbar                       [] Prone          []Supine                       []Intermittent   []Continuous Lbs:  [] before manual  [] after manual  []w/heat    []  Ultrasound: []Continuous   [] Pulsed at:                           []1MHz   []3MHz Location:  W/cm2:    [] Paraffin         Location:   []w/heat   10 [x]  Ice     []  Heat  []  Ice massage Position: supine, LE's elevated  Location: cervical     []  Laser  []  Other: Position:  Location:      []  Vasopneumatic Device Pressure:       [] lo [] med [] hi   Temperature:      [x] Skin assessment post-treatment:  [x]intact []redness- no adverse reaction    []redness  adverse reaction:     35 min Therapeutic Exercise:  [x] See flow sheet :   Rationale: increase ROM and increase strength to improve the patients ability to perform ADL's      15 min Manual Therapy: STM B UT, B LS, cervical paraspinals  Suboccipital release x4  B manual UT stretch    Rationale: decrease pain, increase ROM and increase tissue extensibility to improve the patients ability to exercise            With   [] TE   [] TA   [] neuro   [] other: Patient Education: [x] Review HEP    [] Progressed/Changed HEP based on:   [] positioning   [] body mechanics   [] transfers   [x] heat/ice application    [] other:      Other Objective/Functional Measures: n/a     Pain Level (0-10 scale) post treatment: 0/10    ASSESSMENT/Changes in Function:     Pt with decreased symptoms after manual and therapeutic exercises. Overall tolerated therapy session well. Challenged with flex/scap and bicep curls    Patient will continue to benefit from skilled PT services to modify and progress therapeutic interventions, address functional mobility deficits, address ROM deficits, address strength deficits, analyze and address soft tissue restrictions, analyze and cue movement patterns, analyze and modify body mechanics/ergonomics and assess and modify postural abnormalities to attain remaining goals.      [x]  See Plan of Care  []  See progress note/recertification  []  See Discharge Summary         Progress towards goals / Updated goals:  nt    PLAN  []  Upgrade activities as tolerated     [x]  Continue plan of care  []  Update interventions per flow sheet       []  Discharge due to:_  []  Other:_      Silverio Akers, PT 4/6/2017  2:07 PM

## 2017-04-11 ENCOUNTER — HOSPITAL ENCOUNTER (OUTPATIENT)
Dept: PHYSICAL THERAPY | Age: 77
Discharge: HOME | End: 2017-04-11
Payer: MEDICARE

## 2017-04-11 PROCEDURE — 97110 THERAPEUTIC EXERCISES: CPT | Performed by: PHYSICAL THERAPY ASSISTANT

## 2017-04-11 PROCEDURE — 97140 MANUAL THERAPY 1/> REGIONS: CPT | Performed by: PHYSICAL THERAPY ASSISTANT

## 2017-04-11 NOTE — PROGRESS NOTES
PT DAILY TREATMENT NOTE - [facility] 2-15    Patient Name: Treva Gooden  Date:2017  : 1940  [x]  Patient  Verified  Payor: Johnny Duran / Plan: [place] MED[place] & B / Product Type: Medicare /    In time: 9:00 AM  Out time: 10:00 AM  Total Treatment Time (min): 60  Total Timed Codes (min): 50  1:1 Treatment Time ( [address] only): 40   Visit #: 5    Treatment Area: Neck pain [M54.2]    SUBJECTIVE  Pain Level (0-10 scale): 10  Any medication changes, allergies to medications, adverse drug reactions, diagnosis change, or new procedure performed?: [x] No    [] Yes (see summary sheet for update)  Subjective functional status/changes:   [] No changes reported  Pt reports decreased pain compared to previous visit. \"overall improving. \"    OBJECTIVE    Modality rationale: decrease inflammation, decrease pain and increase tissue extensibility to improve the patients ability to play with grandchildren   Min Type Additional Details    [] Estim: []Att   []Unatt        []TENS instruct                  []IFC  []Premod   []NMES                     []Other:  []w/US   []w/ice   []w/heat  Position:  Location:    []  Traction: [] Cervical       []Lumbar                       [] Prone          []Supine                       []Intermittent   []Continuous Lbs:  [] before manual  [] after manual  []w/heat    []  Ultrasound: []Continuous   [] Pulsed at:                           []1MHz   []3MHz Location:  W/cm2:    [] Paraffin         Location:   []w/heat   10 [x]  Ice     []  Heat  []  Ice massage Position: supine, LE's elevated  Location: cervical     []  Laser  []  Other: Position:  Location:      []  Vasopneumatic Device Pressure:       [] lo [] med [] hi   Temperature:      [x] Skin assessment post-treatment:  [x]intact []redness- no adverse reaction    []redness  adverse reaction:     35 min Therapeutic Exercise:  [x] See flow sheet :   Rationale: increase ROM and increase strength to improve the patients ability to perform ADL's      15 min Manual Therapy: STM B UT, B LS, cervical paraspinals  Suboccipital release x4  B manual UT stretch    Rationale: decrease pain, increase ROM and increase tissue extensibility to improve the patients ability to exercise            With   [] TE   [] TA   [] neuro   [] other: Patient Education: [x] Review HEP    [] Progressed/Changed HEP based on:   [] positioning   [] body mechanics   [] transfers   [x] heat/ice application    [] other:      Other Objective/Functional Measures: n/a     Pain Level (0-10 scale) post treatment: 0/10    ASSESSMENT/Changes in Function:     Patient progressing well with strengthening. Improved soft tissue mobility along posterior cervical musculature. Patient will continue to benefit from skilled PT services to modify and progress therapeutic interventions, address functional mobility deficits, address ROM deficits, address strength deficits, analyze and address soft tissue restrictions, analyze and cue movement patterns, analyze and modify body mechanics/ergonomics and assess and modify postural abnormalities to attain remaining goals.      [x]  See Plan of Care  []  See progress note/recertification  []  See Discharge Summary         Progress towards goals / Updated goals:  nt    PLAN  []  Upgrade activities as tolerated     [x]  Continue plan of care  []  Update interventions per flow sheet       []  Discharge due to:_  []  Other:_      Amber Saenz, PTA 4/11/2017  9:00 AM

## 2017-04-13 ENCOUNTER — HOSPITAL ENCOUNTER (OUTPATIENT)
Dept: PHYSICAL THERAPY | Age: 77
Discharge: HOME | End: 2017-04-13
Payer: MEDICARE

## 2017-04-13 PROCEDURE — 97110 THERAPEUTIC EXERCISES: CPT | Performed by: PHYSICAL THERAPY ASSISTANT

## 2017-04-13 PROCEDURE — 97140 MANUAL THERAPY 1/> REGIONS: CPT | Performed by: PHYSICAL THERAPY ASSISTANT

## 2017-04-13 NOTE — PROGRESS NOTES
PT DAILY TREATMENT NOTE - [facility] 2-15    Patient Name: Evelin Tee  Date:2017  : 1940  [x]  Patient  Verified  Payor: Rubens Armstrong / Plan: [place] MEDICARE PART A & B / Product Type: Medicare /    In time: 8:30 AM  Out time: 9:40 AM  Total Treatment Time (min): 70  Total Timed Codes (min): 60  1:1 Treatment Time ( only): 40   Visit #: 6    Treatment Area: Neck pain [M54.2]    SUBJECTIVE  Pain Level (0-10 scale): 4/10  Any medication changes, allergies to medications, adverse drug reactions, diagnosis change, or new procedure performed?: [x] No    [] Yes (see summary sheet for update)  Subjective functional status/changes:   [] No changes reported  Pt reports she is feeling a lot better today. States that her neck still hurts some but overall she feels benefit from PT. Patient took an Advil last night because her neck hurt after playing 3 hours of Bridge last night.      OBJECTIVE    Modality rationale: decrease inflammation, decrease pain and increase tissue extensibility to improve the patients ability to play with grandchildren   Min Type Additional Details    [] Estim: []Att   []Unatt        []TENS instruct                  []IFC  []Premod   []NMES                     []Other:  []w/US   []w/ice   []w/heat  Position:  Location:    []  Traction: [] Cervical       []Lumbar                       [] Prone          []Supine                       []Intermittent   []Continuous Lbs:  [] before manual  [] after manual  []w/heat    []  Ultrasound: []Continuous   [] Pulsed at:                           []1MHz   []3MHz Location:  W/cm2:    [] Paraffin         Location:   []w/heat   10 [x]  Ice     []  Heat  []  Ice massage Position: supine, LE's elevated  Location: cervical     []  Laser  []  Other: Position:  Location:      []  Vasopneumatic Device Pressure:       [] lo [] med [] hi   Temperature:      [x] Skin assessment post-treatment:  [x]intact []redness- no adverse reaction    []redness  adverse reaction: 35 min Therapeutic Exercise:  [x] See flow sheet :   Rationale: increase ROM and increase strength to improve the patients ability to perform ADL's      15 min Manual Therapy: STM B UT, B LS, cervical paraspinals  Suboccipital release x4  B manual UT stretch    Rationale: decrease pain, increase ROM and increase tissue extensibility to improve the patients ability to exercise            With   [] TE   [] TA   [] neuro   [] other: Patient Education: [x] Review HEP    [] Progressed/Changed HEP based on:   [] positioning: Reviewed postural principles when sitting: keep cervical spine in neutral as much as possible and avoid too much forward head/flexion. [] body mechanics   [] transfers   [x] heat/ice application    [] other: Pt. Education on taking Advil before going to play Bridge vs after. Other Objective/Functional Measures: TTP R upper trap > L. Pain Level (0-10 scale) post treatment: 0/10    ASSESSMENT/Changes in Function:     Patient progressing well with strengthening. Improved soft tissue mobility along posterior cervical musculature. Patient will continue to benefit from skilled PT services to modify and progress therapeutic interventions, address functional mobility deficits, address ROM deficits, address strength deficits, analyze and address soft tissue restrictions, analyze and cue movement patterns, analyze and modify body mechanics/ergonomics and assess and modify postural abnormalities to attain remaining goals.      [x]  See Plan of Care  []  See progress note/recertification  []  See Discharge Summary         Progress towards goals / Updated goals:  nt    PLAN  []  Upgrade activities as tolerated     [x]  Continue plan of care  []  Update interventions per flow sheet       []  Discharge due to:_  []  Other:_      Claudette Dimes, PTA 4/13/2017  8:30 AM

## 2017-04-18 ENCOUNTER — APPOINTMENT (OUTPATIENT)
Dept: PHYSICAL THERAPY | Age: 77
End: 2017-04-18
Payer: MEDICARE

## 2017-04-20 ENCOUNTER — HOSPITAL ENCOUNTER (OUTPATIENT)
Dept: PHYSICAL THERAPY | Age: 77
Discharge: HOME | End: 2017-04-20
Payer: MEDICARE

## 2017-04-20 PROCEDURE — 97140 MANUAL THERAPY 1/> REGIONS: CPT | Performed by: PHYSICAL THERAPY ASSISTANT

## 2017-04-20 PROCEDURE — 97110 THERAPEUTIC EXERCISES: CPT | Performed by: PHYSICAL THERAPY ASSISTANT

## 2017-04-20 NOTE — PROGRESS NOTES
PT DAILY TREATMENT NOTE - [facility] 2-15    Patient Name: Alfredo Singleton  Date:2017  : 1940  [x]  Patient  Verified  Payor: [place] MEDICARE / Plan: [place] MEDICARE PART A & B / Product Type: Medicare /    In time: 9:00 AM  Out time: 10:05 AM  Total Treatment Time (min): 65  Total Timed Codes (min): 55  1:1 Treatment Time ([facility] only): 40   Visit #: 7    Treatment Area: Neck pain [M54.2]    SUBJECTIVE  Pain Level (0-10 scale): 10  Any medication changes, allergies to medications, adverse drug reactions, diagnosis change, or new procedure performed?: [x] No    [] Yes (see summary sheet for update)  Subjective functional status/changes:   [] No changes reported  Pt reports she is feeling \"better\" today but she had a tough week. C/o of increased pain in cervical paraspinals and a HA that has lasted a couple days but both seem to be resolving. Pt reports she's been taking Advil to help with HA. Pt reports that her daughter had surgery on Tuesday and she thinks the increase in pain is coming from being stressed about the surgery. States the usual pain/tightness she has in her B UT is mostly gone.      OBJECTIVE    Modality rationale: decrease inflammation, decrease pain and increase tissue extensibility to improve the patients ability to play with grandchildren   Min Type Additional Details    [] Estim: []Att   []Unatt        []TENS instruct                  []IFC  []Premod   []NMES                     []Other:  []w/US   []w/ice   []w/heat  Position:  Location:    []  Traction: [] Cervical       []Lumbar                       [] Prone          []Supine                       []Intermittent   []Continuous Lbs:  [] before manual  [] after manual  []w/heat    []  Ultrasound: []Continuous   [] Pulsed at:                           []1MHz   []3MHz Location:  W/cm2:    [] Paraffin         Location:   []w/heat   10 [x]  Ice     []  Heat  []  Ice massage Position: supine, LE's elevated  Location: cervical     []  Laser  []  Other: Position:  Location:      []  Vasopneumatic Device Pressure:       [] lo [] med [] hi   Temperature:      [x] Skin assessment post-treatment:  [x]intact []redness- no adverse reaction    []redness  adverse reaction:     40 min Therapeutic Exercise:  [x] See flow sheet : Added LT wall slides    Rationale: increase ROM and increase strength to improve the patients ability to perform ADL's      15 min Manual Therapy: STM B UT, B LS, cervical paraspinals  Suboccipital release x4  B manual UT stretch    Rationale: decrease pain, increase ROM and increase tissue extensibility to improve the patients ability to exercise            With   [] TE   [] TA   [] neuro   [] other: Patient Education: [x] Review HEP    [] Progressed/Changed HEP based on:   [] positioning:   [] body mechanics   [] transfers   [x] heat/ice application    [] other:     Other Objective/Functional Measures: TTP R upper trap > L. TTP cervical paraspinals R>L     Pain Level (0-10 scale) post treatment: 0/10    ASSESSMENT/Changes in Function:     Pt tolerated tx well today with no c/o pain during exercises. Min verbal and tactile cues for scapular setting during exercises. Decreased pain and stiffness after manual therapy. Patient will continue to benefit from skilled PT services to modify and progress therapeutic interventions, address functional mobility deficits, address ROM deficits, address strength deficits, analyze and address soft tissue restrictions, analyze and cue movement patterns, analyze and modify body mechanics/ergonomics and assess and modify postural abnormalities to attain remaining goals.      [x]  See Plan of Care  []  See progress note/recertification  []  See Discharge Summary         Progress towards goals / Updated goals:  nt    PLAN  []  Upgrade activities as tolerated     [x]  Continue plan of care  []  Update interventions per flow sheet       []  Discharge due to:_  []  Other:_      Toni Yen, PTA 4/20/2017  9:00 AM

## 2017-04-25 ENCOUNTER — HOSPITAL ENCOUNTER (OUTPATIENT)
Dept: PHYSICAL THERAPY | Age: 77
Discharge: HOME | End: 2017-04-25
Payer: MEDICARE

## 2017-04-25 PROCEDURE — 97110 THERAPEUTIC EXERCISES: CPT | Performed by: PHYSICAL THERAPY ASSISTANT

## 2017-04-25 PROCEDURE — 97140 MANUAL THERAPY 1/> REGIONS: CPT | Performed by: PHYSICAL THERAPY ASSISTANT

## 2017-04-25 NOTE — PROGRESS NOTES
PT DAILY TREATMENT NOTE - [facility] 2-15    Patient Name: Inderjit Donahue  Date:2017  : 1940  [x]  Patient  Verified  Payor: [place] MEDICARE / Plan: [place] MEDICARE PART A & B / Product Type: Medicare /    In time: 8:30 AM  Out time: 9:35 AM  Total Treatment Time (min): 65   Total Timed Codes (min): 55   1:1 Treatment Time ( [address] only): 45  Visit #: 8    Treatment Area: Neck pain [M54.2]    SUBJECTIVE  Pain Level (0-10 scale): 4/10  Any medication changes, allergies to medications, adverse drug reactions, diagnosis change, or new procedure performed?: [x] No    [] Yes (see summary sheet for update)  Subjective functional status/changes:   [] No changes reported  Pt reports decreased pain in her neck but she still has pain in the front of her head. \"I am taking Advil and Tylenol and it is helping some. \" Reports her HA are improving but they are still there - she told her Dr about them and he told her they are normal. States she has been more aware of her posture when playing bridge.      OBJECTIVE    Modality rationale: decrease inflammation, decrease pain and increase tissue extensibility to improve the patients ability to play with grandchildren   Min Type Additional Details    [] Estim: []Att   []Unatt        []TENS instruct                  []IFC  []Premod   []NMES                     []Other:  []w/US   []w/ice   []w/heat  Position:  Location:    []  Traction: [] Cervical       []Lumbar                       [] Prone          []Supine                       []Intermittent   []Continuous Lbs:  [] before manual  [] after manual  []w/heat    []  Ultrasound: []Continuous   [] Pulsed at:                           []1MHz   []3MHz Location:  W/cm2:    [] Paraffin         Location:   []w/heat   10 [x]  Ice     []  Heat  []  Ice massage Position: supine, LE's elevated  Location: cervical     []  Laser  []  Other: Position:  Location:      []  Vasopneumatic Device Pressure:       [] lo [] med [] hi   Temperature:      [x] Skin assessment post-treatment:  [x]intact []redness- no adverse reaction    []redness  adverse reaction:     40 min Therapeutic Exercise:  [x] See flow sheet :    Rationale: increase ROM and increase strength to improve the patients ability to perform ADL's      15 min Manual Therapy: STM B UT, B LS, cervical paraspinals  Suboccipital release x3  B manual UT stretch    Rationale: decrease pain, increase ROM and increase tissue extensibility to improve the patients ability to exercise            With   [x] TE   [] TA   [] neuro   [] other: Patient Education: [x] Review HEP    [] Progressed/Changed HEP based on:   [] positioning:   [] body mechanics   [] transfers   [x] heat/ice application    [] other:  re: HA worsening to contact Dr     Other Objective/Functional Measures: TTP R upper trap > L. Pain Level (0-10 scale) post treatment: 1/10    ASSESSMENT/Changes in Function:     Decreased cues for scapular setting during wall slides. Improved control with supine serratus punches. Pt tolerated tx well with decreased HA pain after manual therapy. Patient will continue to benefit from skilled PT services to modify and progress therapeutic interventions, address functional mobility deficits, address ROM deficits, address strength deficits, analyze and address soft tissue restrictions, analyze and cue movement patterns, analyze and modify body mechanics/ergonomics and assess and modify postural abnormalities to attain remaining goals.      [x]  See Plan of Care  []  See progress note/recertification  []  See Discharge Summary         Progress towards goals / Updated goals:  nt    PLAN  []  Upgrade activities as tolerated     [x]  Continue plan of care  []  Update interventions per flow sheet       []  Discharge due to:_  []  Other:_      Hien Hurt PTA 4/25/2017  8:30 AM

## 2017-04-27 ENCOUNTER — HOSPITAL ENCOUNTER (OUTPATIENT)
Dept: PHYSICAL THERAPY | Age: 77
Discharge: HOME | End: 2017-04-27
Payer: MEDICARE

## 2017-04-27 PROCEDURE — 97110 THERAPEUTIC EXERCISES: CPT | Performed by: PHYSICAL THERAPY ASSISTANT

## 2017-04-27 PROCEDURE — 97140 MANUAL THERAPY 1/> REGIONS: CPT | Performed by: PHYSICAL THERAPY ASSISTANT

## 2017-04-27 NOTE — PROGRESS NOTES
PROGRESS NOTE - [facility] 2-15    Patient Name: Treva Gooden  Date:2017  : 1940  [x]  Patient  Verified  Payor: Johnny Duran / Plan: [place] MEDICARE PART A & B / Product Type: Medicare /    In time: 9:00 AM  Out time: 10:05 AM  Total Treatment Time (min): 65   Total Timed Codes (min): 55   1:1 Treatment Time ( [address] only): 50  Visit #: 9    Treatment Area: Neck pain [M54.2]    SUBJECTIVE  Pain Level (0-10 scale): 3/10  Any medication changes, allergies to medications, adverse drug reactions, diagnosis change, or new procedure performed?: [x] No    [] Yes (see summary sheet for update)  Subjective functional status/changes:   [] No changes reported  Pt reports decreased HA but increased soreness on the right side of her neck. \"I felt great, no pain, after my visit last week. Then the next day I started getting soreness on the right side. \" Worst pain: 10/10 (\"2 weeks ago was when I had pain this bad\") Best pain: 2-3/10. Patient states she has less pain lifting her grandchildren but \"pain time to time. \"    OBJECTIVE    Modality rationale: decrease inflammation, decrease pain and increase tissue extensibility to improve the patients ability to play with grandchildren   Min Type Additional Details    [] Estim: []Att   []Unatt        []TENS instruct                  []IFC  []Premod   []NMES                     []Other:  []w/US   []w/ice   []w/heat  Position:  Location:    []  Traction: [] Cervical       []Lumbar                       [] Prone          []Supine                       []Intermittent   []Continuous Lbs:  [] before manual  [] after manual  []w/heat    []  Ultrasound: []Continuous   [] Pulsed at:                           []1MHz   []3MHz Location:  W/cm2:    [] Paraffin         Location:   []w/heat   10 [x]  Ice     []  Heat  []  Ice massage Position: supine, LE's elevated  Location: cervical     []  Laser  []  Other: Position:  Location:      []  Vasopneumatic Device Pressure:       [] lo [] med [] hi Temperature:      [x] Skin assessment post-treatment:  [x]intact []redness- no adverse reaction    []redness  adverse reaction:     35 min Therapeutic Exercise:  [x] See flow sheet : Reassessment performed   Rationale: increase ROM and increase strength to improve the patients ability to perform ADL's      15 min Manual Therapy: STM B UT, B LS, cervical paraspinals  Suboccipital release x3   Rationale: decrease pain, increase ROM and increase tissue extensibility to improve the patients ability to exercise            With   [x] TE   [] TA   [] neuro   [] other: Patient Education: [x] Review HEP    [] Progressed/Changed HEP based on:   [] positioning:   [] body mechanics   [] transfers   [x] heat/ice application    [] other:      Other Objective/Functional Measures: Active Movements:   AROM Degrees Comments:pain, area   Forward flexion 36 Pain at end range   Extension 40 Pain at end range   Rotation right 60    Rotation left 47 Pain R>L   SB right 36 Pain R, Stretch L   SB left 28 Pain R>L      UE AROM: B shoulder flexion WNL     Strength (Upper Extremities):    L(0-5) R (0-5) Comments   Shoulder Flexion 5 5    Shoulder Scapion 5 5    Elbow Flexion  5 5     Elbow Extension  5 5           Palpation:  Mod TTP R LS, R suboccipitals    FOTO Outcome measure: nt - pt didn't bring her glasses. To obtain next visit.      Pain Level (0-10 scale) post treatment: 0/10    ASSESSMENT/Changes in Function:      Patient seen for 9 skilled physical therapy visits and noting decreased pain, increased ROM and strength overall. Pt continues to be painful on R with L lateral flexion and L rotation and end range cervical flexion. TTP at R LS and R suboccipitals. Pt improved ADL's and functional tasks since start of treatment. Pt has met 3/3 short-term goals and 1/3 long-term goals with progression towards lifting grandchildren and AROM without pain. Recommended pt continue PT for 1-2x/week for 2-3 weeks.  Initially pain 10/10 at worst, currently 3-4/10. Patient will continue to benefit from skilled PT services to modify and progress therapeutic interventions, address functional mobility deficits, address ROM deficits, address strength deficits, analyze and address soft tissue restrictions, analyze and cue movement patterns, analyze and modify body mechanics/ergonomics and assess and modify postural abnormalities to attain remaining goals. [x]  See Plan of Care  []  See progress note/recertification  []  See Discharge Summary         Progress towards goals / Updated goals:    Short Term Goals: To be accomplished in 2-3 weeks:  1) Pt will be independent with initial HEP MET - 1x/day  2) Pt will report 25% decrease in exacerbation of symptoms Met  3) Pt will report use of ice at home to decrease inflammation/pain Met     Long Term Goals:  To be accomplished in 8-12 weeks:  1) Pt will perform resisted shoulder flex, scap without pain in order to lift grocery bags Met  2) Pt will report being able to lift grandchildren without pain in order to return to prior level of function Progressing towards  3) Pt will perform neck AROM all directions without pain in order to return to prior level of function Progressing towards    PLAN  []  Upgrade activities as tolerated     [x]  Continue plan of care  []  Update interventions per flow sheet       []  Discharge due to:_  []  Other: Pt to continue PT 1-2x/week for 2-3 weeks_      Johanna Mclean, PTA 4/27/2017  9:00 AM

## 2017-05-02 ENCOUNTER — HOSPITAL ENCOUNTER (OUTPATIENT)
Dept: PHYSICAL THERAPY | Age: 77
Discharge: HOME | End: 2017-05-02
Payer: MEDICARE

## 2017-05-02 PROCEDURE — 97140 MANUAL THERAPY 1/> REGIONS: CPT | Performed by: PHYSICAL THERAPIST

## 2017-05-02 PROCEDURE — G8985 CARRY GOAL STATUS: HCPCS | Performed by: PHYSICAL THERAPIST

## 2017-05-02 PROCEDURE — G8984 CARRY CURRENT STATUS: HCPCS | Performed by: PHYSICAL THERAPIST

## 2017-05-02 PROCEDURE — 97110 THERAPEUTIC EXERCISES: CPT | Performed by: PHYSICAL THERAPIST

## 2017-05-02 NOTE — PROGRESS NOTES
PROGRESS NOTE - [facility] 2-15    Patient Name: Roxanna Dimas  Date:2017  : 1940  [x]  Patient  Verified  Payor: [place] MED[place] / Plan: [place] MEDICARE PART A & B / Product Type: Medicare /    In time: 9:30 AM  Out time: 10:25 AM  Total Treatment Time (min): 55   Total Timed Codes (min): 55   1:1 Treatment Time ( only): 30  Visit #: 10    Treatment Area: Neck pain [M54.2]    SUBJECTIVE  Pain Level (0-10 scale): 3/10  Any medication changes, allergies to medications, adverse drug reactions, diagnosis change, or new procedure performed?: [x] No    [] Yes (see summary sheet for update)  Subjective functional status/changes:   [] No changes reported  Pt reports she has been feeling \"okay\". Her and her  are currently watching their twin 2 yr old grandchildren because her daughter is out of town.  \"It's been a lot with having to pick them up and watch them\"    OBJECTIVE    Modality rationale: decrease inflammation, decrease pain and increase tissue extensibility to improve the patients ability to play with grandchildren   Min Type Additional Details    [] Estim: []Att   []Unatt        []TENS instruct                  []IFC  []Premod   []NMES                     []Other:  []w/US   []w/ice   []w/heat  Position:  Location:    []  Traction: [] Cervical       []Lumbar                       [] Prone          []Supine                       []Intermittent   []Continuous Lbs:  [] before manual  [] after manual  []w/heat    []  Ultrasound: []Continuous   [] Pulsed at:                           []1MHz   []3MHz Location:  W/cm2:    [] Paraffin         Location:   []w/heat   Pt declined today [x]  Ice     []  Heat  []  Ice massage Position: supine, LE's elevated  Location: cervical     []  Laser  []  Other: Position:  Location:      []  Vasopneumatic Device Pressure:       [] lo [] med [] hi   Temperature:      [x] Skin assessment post-treatment:  [x]intact []redness- no adverse reaction    []redness  adverse reaction:     45 min Therapeutic Exercise:  [x] See flow sheet :    Rationale: increase ROM and increase strength to improve the patients ability to perform ADL's      10 min Manual Therapy: STM B UT, B LS, cervical paraspinals  Suboccipital release x3   Rationale: decrease pain, increase ROM and increase tissue extensibility to improve the patients ability to exercise            With   [x] TE   [] TA   [] neuro   [] other: Patient Education: [x] Review HEP    [] Progressed/Changed HEP based on:   [] positioning:   [] body mechanics   [] transfers   [x] heat/ice application    [] other:      Other Objective/Functional Measures:     FOTO= 63 (16 point decrease since initial eval)     Pain Level (0-10 scale) post treatment: 3/10    ASSESSMENT/Changes in Function:      Pt reports improved ADL's and functional tasks since start of treatment, however scored 16 points lower on functional outcome survey compared to initial visit. Pt reported decreased symptoms after manual therapy. Overall tolerated therapy session well. Patient will continue to benefit from skilled PT services to modify and progress therapeutic interventions, address functional mobility deficits, address ROM deficits, address strength deficits, analyze and address soft tissue restrictions, analyze and cue movement patterns, analyze and modify body mechanics/ergonomics and assess and modify postural abnormalities to attain remaining goals. [x]  See Plan of Care  []  See progress note/recertification  []  See Discharge Summary         Progress towards goals / Updated goals:    Short Term Goals: To be accomplished in 2-3 weeks:  1) Pt will be independent with initial HEP MET - 1x/day  2) Pt will report 25% decrease in exacerbation of symptoms Met  3) Pt will report use of ice at home to decrease inflammation/pain Met     Long Term Goals:  To be accomplished in 8-12 weeks:  1) Pt will perform resisted shoulder flex, scap without pain in order to lift grocery bags Met  2) Pt will report being able to lift grandchildren without pain in order to return to prior level of function Progressing towards  3) Pt will perform neck AROM all directions without pain in order to return to prior level of function Progressing towards    PLAN  []  Upgrade activities as tolerated     [x]  Continue plan of care  []  Update interventions per flow sheet       []  Discharge due to:_  []  Other: Pt to continue PT 1-2x/week for 2-3 weeks_      Lexx Simms PT 5/2/2017  9:49 AM

## 2017-05-05 ENCOUNTER — HOSPITAL ENCOUNTER (OUTPATIENT)
Dept: PHYSICAL THERAPY | Age: 77
Discharge: HOME | End: 2017-05-05
Payer: MEDICARE

## 2017-05-05 PROCEDURE — 97110 THERAPEUTIC EXERCISES: CPT | Performed by: PHYSICAL THERAPIST

## 2017-05-05 PROCEDURE — 97140 MANUAL THERAPY 1/> REGIONS: CPT | Performed by: PHYSICAL THERAPIST

## 2017-05-05 NOTE — PROGRESS NOTES
PROGRESS NOTE - [facility] 2-15    Patient Name: Aidee Kang  Date:2017  : 1940  [x]  Patient  Verified  Payor: [place] MEDICARE / Plan: [place] MEDICARE PART A & B / Product Type: Medicare /    In time: 9:00 AM  Out time: 10:05 AM  Total Treatment Time (min): 65   Total Timed Codes (min): 55  1:1 Treatment Time ([facility] only): 45  Visit #: 11    Treatment Area: Neck pain [M54.2]    SUBJECTIVE  Pain Level (0-10 scale): 2-3/10  Any medication changes, allergies to medications, adverse drug reactions, diagnosis change, or new procedure performed?: [x] No    [] Yes (see summary sheet for update)  Subjective functional status/changes:   [] No changes reported  Pt states \"I am doing much better today\"    OBJECTIVE    Modality rationale: decrease inflammation, decrease pain and increase tissue extensibility to improve the patients ability to play with grandchildren   Min Type Additional Details    [] Estim: []Att   []Unatt        []TENS instruct                  []IFC  []Premod   []NMES                     []Other:  []w/US   []w/ice   []w/heat  Position:  Location:    []  Traction: [] Cervical       []Lumbar                       [] Prone          []Supine                       []Intermittent   []Continuous Lbs:  [] before manual  [] after manual  []w/heat    []  Ultrasound: []Continuous   [] Pulsed at:                           []1MHz   []3MHz Location:  W/cm2:    [] Paraffin         Location:   []w/heat   10 [x]  Ice     []  Heat  []  Ice massage Position: supine, LE's elevated  Location: cervical     []  Laser  []  Other: Position:  Location:      []  Vasopneumatic Device Pressure:       [] lo [] med [] hi   Temperature:      [x] Skin assessment post-treatment:  [x]intact []redness- no adverse reaction    []redness  adverse reaction:     45 min Therapeutic Exercise:  [x] See flow sheet :    Rationale: increase ROM and increase strength to improve the patients ability to perform ADL's      10 min Manual Therapy: STM B UT, B LS, cervical paraspinals  Suboccipital release x3   Rationale: decrease pain, increase ROM and increase tissue extensibility to improve the patients ability to exercise            With   [x] TE   [] TA   [] neuro   [] other: Patient Education: [x] Review HEP    [] Progressed/Changed HEP based on:   [] positioning:   [] body mechanics   [] transfers   [x] heat/ice application    [] other:      Other Objective/Functional Measures:     n/a     Pain Level (0-10 scale) post treatment: 1/10    ASSESSMENT/Changes in Function:      Dec'd symptoms after manual therapy. Reviewed HEP- req'd cues for proper form during UT and LS stretches. Overall tolerated therapy session well. Patient will continue to benefit from skilled PT services to modify and progress therapeutic interventions, address functional mobility deficits, address ROM deficits, address strength deficits, analyze and address soft tissue restrictions, analyze and cue movement patterns, analyze and modify body mechanics/ergonomics and assess and modify postural abnormalities to attain remaining goals. [x]  See Plan of Care  []  See progress note/recertification  []  See Discharge Summary         Progress towards goals / Updated goals:    Short Term Goals: To be accomplished in 2-3 weeks:  1) Pt will be independent with initial HEP MET - 1x/day  2) Pt will report 25% decrease in exacerbation of symptoms Met  3) Pt will report use of ice at home to decrease inflammation/pain Met     Long Term Goals:  To be accomplished in 8-12 weeks:  1) Pt will perform resisted shoulder flex, scap without pain in order to lift grocery bags Met  2) Pt will report being able to lift grandchildren without pain in order to return to prior level of function Progressing towards  3) Pt will perform neck AROM all directions without pain in order to return to prior level of function Progressing towards    PLAN  []  Upgrade activities as tolerated     [x]  Continue plan of care  []  Update interventions per flow sheet       []  Discharge due to:_  []  Other: Pt to continue PT 1-2x/week for 2-3 weeks_      Silverio Akers, PT 5/5/2017  9:25 AM

## 2017-05-12 ENCOUNTER — HOSPITAL ENCOUNTER (OUTPATIENT)
Dept: PHYSICAL THERAPY | Age: 77
Discharge: HOME | End: 2017-05-12
Payer: MEDICARE

## 2017-05-12 PROCEDURE — 97110 THERAPEUTIC EXERCISES: CPT | Performed by: PHYSICAL THERAPIST

## 2017-05-12 PROCEDURE — 97140 MANUAL THERAPY 1/> REGIONS: CPT | Performed by: PHYSICAL THERAPIST

## 2017-05-12 NOTE — PROGRESS NOTES
PROGRESS NOTE - [facility] 2-15    Patient Name: Ron Beatty  Date:2017  : 1940  [x]  Patient  Verified  Payor: [place] MEDICARE / Plan: [place] MEDICARE PART A & B / Product Type: Medicare /    In time: 9:30 AM  Out time: 10:30 AM  Total Treatment Time (min): 60  Total Timed Codes (min): 54  1:1 Treatment Time ( only): 30 min  Visit #: 12    Treatment Area: Neck pain [M54.2]    SUBJECTIVE  Pain Level (0-10 scale): 2-3/10  Any medication changes, allergies to medications, adverse drug reactions, diagnosis change, or new procedure performed?: [x] No    [] Yes (see summary sheet for update)  Subjective functional status/changes:   [] No changes reported  Pt states she is feeling about the same. Reports she is still having some R sided neck pain but \"the muscles just feel tight. \"    OBJECTIVE    Modality rationale: decrease inflammation, decrease pain and increase tissue extensibility to improve the patients ability to play with grandchildren   Min Type Additional Details    [] Estim: []Att   []Unatt        []TENS instruct                  []IFC  []Premod   []NMES                     []Other:  []w/US   []w/ice   []w/heat  Position:  Location:    []  Traction: [] Cervical       []Lumbar                       [] Prone          []Supine                       []Intermittent   []Continuous Lbs:  [] before manual  [] after manual  []w/heat    []  Ultrasound: []Continuous   [] Pulsed at:                           []1MHz   []3MHz Location:  W/cm2:    [] Paraffin         Location:   []w/heat   6 [x]  Ice     []  Heat  []  Ice massage Position: supine, LE's elevated  Location: cervical     []  Laser  []  Other: Position:  Location:      []  Vasopneumatic Device Pressure:       [] lo [] med [] hi   Temperature:      [x] Skin assessment post-treatment:  [x]intact []redness- no adverse reaction    []redness  adverse reaction:     39 min Therapeutic Exercise:  [x] See flow sheet :    Rationale: increase ROM and increase strength to improve the patients ability to perform ADL's      15 min Manual Therapy: STM B UT, B LS, cervical paraspinals  Suboccipital release x3   Rationale: decrease pain, increase ROM and increase tissue extensibility to improve the patients ability to exercise            With   [x] TE   [] TA   [] neuro   [] other: Patient Education: [x] Review HEP    [] Progressed/Changed HEP based on:   [] positioning:   [] body mechanics   [] transfers   [x] heat/ice application    [] other:      Other Objective/Functional Measures:     n/a     Pain Level (0-10 scale) post treatment: 1/10    ASSESSMENT/Changes in Function:      Patient with decreased symptoms after manual therapy. Pt continues to require cues to avoid UT compensation during LT wall slides. Overall tolerated treatment well. Patient will continue to benefit from skilled PT services to modify and progress therapeutic interventions, address functional mobility deficits, address ROM deficits, address strength deficits, analyze and address soft tissue restrictions, analyze and cue movement patterns, analyze and modify body mechanics/ergonomics and assess and modify postural abnormalities to attain remaining goals. [x]  See Plan of Care  []  See progress note/recertification  []  See Discharge Summary         Progress towards goals / Updated goals:    Short Term Goals: To be accomplished in 2-3 weeks:  1) Pt will be independent with initial HEP MET - 1x/day  2) Pt will report 25% decrease in exacerbation of symptoms Met  3) Pt will report use of ice at home to decrease inflammation/pain Met     Long Term Goals:  To be accomplished in 8-12 weeks:  1) Pt will perform resisted shoulder flex, scap without pain in order to lift grocery bags Met  2) Pt will report being able to lift grandchildren without pain in order to return to prior level of function Progressing towards  3) Pt will perform neck AROM all directions without pain in order to return to prior level of function Progressing towards    PLAN  []  Upgrade activities as tolerated     [x]  Continue plan of care  []  Update interventions per flow sheet       []  Discharge due to:_  []  Other: Pt to continue PT 1-2x/week for 2-3 weeks_      Ryan Garcia, PT 5/12/2017  9:51 AM

## 2017-05-16 ENCOUNTER — HOSPITAL ENCOUNTER (OUTPATIENT)
Dept: PHYSICAL THERAPY | Age: 77
Discharge: HOME | End: 2017-05-16
Payer: MEDICARE

## 2017-05-16 PROCEDURE — 97110 THERAPEUTIC EXERCISES: CPT | Performed by: PHYSICAL THERAPY ASSISTANT

## 2017-05-16 PROCEDURE — 97140 MANUAL THERAPY 1/> REGIONS: CPT | Performed by: PHYSICAL THERAPY ASSISTANT

## 2017-05-16 NOTE — PROGRESS NOTES
PT DAILY TREATMENT NOTE - [facility] 2-15    Patient Name: Joaquin Oh  Date:2017  : 1940  [x]  Patient  Verified  Payor: Rosemary Cedeno / Plan: [place] MEDICARE PART A & B / Product Type: Medicare /    In time: 9:00 AM  Out time: 10:00 AM  Total Treatment Time (min): 60  Total Timed Codes (min): 50  1:1 Treatment Time ( only): 30 min  Visit #: 14    Treatment Area: Neck pain [M54.2]    SUBJECTIVE  Pain Level (0-10 scale): 2/10  Any medication changes, allergies to medications, adverse drug reactions, diagnosis change, or new procedure performed?: [x] No    [] Yes (see summary sheet for update)  Subjective functional status/changes:   [] No changes reported  Patient reports \"pain is a little less. \" continued R sided cervical pain and stiffness    OBJECTIVE    Modality rationale: decrease inflammation, decrease pain and increase tissue extensibility to improve the patients ability to play with grandchildren   Min Type Additional Details    [] Estim: []Att   []Unatt        []TENS instruct                  []IFC  []Premod   []NMES                     []Other:  []w/US   []w/ice   []w/heat  Position:  Location:    []  Traction: [] Cervical       []Lumbar                       [] Prone          []Supine                       []Intermittent   []Continuous Lbs:  [] before manual  [] after manual  []w/heat    []  Ultrasound: []Continuous   [] Pulsed at:                           []1MHz   []3MHz Location:  W/cm2:    [] Paraffin         Location:   []w/heat   10 [x]  Ice     []  Heat  []  Ice massage Position: supine, LE's elevated  Location: cervical     []  Laser  []  Other: Position:  Location:      []  Vasopneumatic Device Pressure:       [] lo [] med [] hi   Temperature:      [x] Skin assessment post-treatment:  [x]intact []redness- no adverse reaction    []redness  adverse reaction:     35 min Therapeutic Exercise:  [x] See flow sheet :    Rationale: increase ROM and increase strength to improve the patients ability to perform ADL's      15 min Manual Therapy: STM B UT, B LS, cervical paraspinals  Suboccipital release x3   Rationale: decrease pain, increase ROM and increase tissue extensibility to improve the patients ability to exercise            With   [x] TE   [] TA   [] neuro   [] other: Patient Education: [x] Review HEP    [] Progressed/Changed HEP based on:   [] positioning:   [] body mechanics   [] transfers   [x] heat/ice application    [] other:      Other Objective/Functional Measures:     TTP R SCM as Mastoid Process and R UT mid muscle belly     Pain Level (0-10 scale) post treatment: 0/10    ASSESSMENT/Changes in Function:      Challenged with prone horizontal abduction, cueing to decreased UT activation. Patient reporting slight R shoulder pain with B shoulder ER with red band, decreased range and cues for posture alleviated pain. Patient will continue to benefit from skilled PT services to modify and progress therapeutic interventions, address functional mobility deficits, address ROM deficits, address strength deficits, analyze and address soft tissue restrictions, analyze and cue movement patterns, analyze and modify body mechanics/ergonomics and assess and modify postural abnormalities to attain remaining goals. [x]  See Plan of Care  []  See progress note/recertification  []  See Discharge Summary         Progress towards goals / Updated goals:    Short Term Goals: To be accomplished in 2-3 weeks:  1) Pt will be independent with initial HEP MET - 1x/day  2) Pt will report 25% decrease in exacerbation of symptoms Met  3) Pt will report use of ice at home to decrease inflammation/pain Met     Long Term Goals:  To be accomplished in 8-12 weeks:  1) Pt will perform resisted shoulder flex, scap without pain in order to lift grocery bags Met  2) Pt will report being able to lift grandchildren without pain in order to return to prior level of function Progressing towards  3) Pt will perform neck AROM all directions without pain in order to return to prior level of function Progressing towards    PLAN  []  Upgrade activities as tolerated     [x]  Continue plan of care  []  Update interventions per flow sheet       []  Discharge due to:_  []  Other: Pt to continue PT 1-2x/week for 2-3 weeks_      Laura Coates PTA 5/16/2017  9:00 AM

## 2017-05-18 ENCOUNTER — HOSPITAL ENCOUNTER (OUTPATIENT)
Dept: PHYSICAL THERAPY | Age: 77
Discharge: HOME | End: 2017-05-18
Payer: MEDICARE

## 2017-05-18 PROCEDURE — 97140 MANUAL THERAPY 1/> REGIONS: CPT | Performed by: PHYSICAL THERAPIST

## 2017-05-18 PROCEDURE — 97110 THERAPEUTIC EXERCISES: CPT | Performed by: PHYSICAL THERAPIST

## 2017-05-18 NOTE — PROGRESS NOTES
PT DAILY TREATMENT NOTE - [facility] 2-15    Patient Name: Aidee Kang  Date:2017  : 1940  [x]  Patient  Verified  Payor: [place] MEDICARE / Plan: [place] MEDICARE PART A & B / Product Type: Medicare /    In time: 9:30 AM  Out time: 10:35 AM  Total Treatment Time (min): 65  Total Timed Codes (min): 55  1:1 Treatment Time ( only): 30 min  Visit #: 15    Treatment Area: Neck pain [M54.2]    SUBJECTIVE  Pain Level (0-10 scale): 3/10  Any medication changes, allergies to medications, adverse drug reactions, diagnosis change, or new procedure performed?: [x] No    [] Yes (see summary sheet for update)  Subjective functional status/changes:   [] No changes reported  Patient states that she fell yesterday- \"my foot got caught while I was going up the stairs\". She landed on her hands- pt states she did not hit her head.  \"I am sore though today, it made my back and neck start to hurt\"  Prior to the recent fall, pt reports \"almost nothing\" referring to pain levels    OBJECTIVE    Modality rationale: decrease inflammation, decrease pain and increase tissue extensibility to improve the patients ability to play with grandchildren   Min Type Additional Details    [] Estim: []Att   []Unatt        []TENS instruct                  []IFC  []Premod   []NMES                     []Other:  []w/US   []w/ice   []w/heat  Position:  Location:    []  Traction: [] Cervical       []Lumbar                       [] Prone          []Supine                       []Intermittent   []Continuous Lbs:  [] before manual  [] after manual  []w/heat    []  Ultrasound: []Continuous   [] Pulsed at:                           []1MHz   []3MHz Location:  W/cm2:    [] Paraffin         Location:   []w/heat   10 [x]  Ice     []  Heat  []  Ice massage Position: supine, LE's elevated  Location: cervical     []  Laser  []  Other: Position:  Location:      []  Vasopneumatic Device Pressure:       [] lo [] med [] hi   Temperature:      [x] Skin assessment post-treatment:  [x]intact []redness- no adverse reaction    []redness  adverse reaction:     40 min Therapeutic Exercise:  [x] See flow sheet :    Rationale: increase ROM and increase strength to improve the patients ability to perform ADL's      15 min Manual Therapy: STM B UT, B LS, SCM, cervical paraspinals  Suboccipital release x3   Rationale: decrease pain, increase ROM and increase tissue extensibility to improve the patients ability to exercise            With   [x] TE   [] TA   [] neuro   [] other: Patient Education: [x] Review HEP    [] Progressed/Changed HEP based on:   [] positioning:   [] body mechanics   [] transfers   [x] heat/ice application    [] other:      Other Objective/Functional Measures:     Inc'd TTP cervical paraspinals today     Pain Level (0-10 scale) post treatment: 0/10    ASSESSMENT/Changes in Function:      Pt challenged with tband shoulder flexion. She req'd cuing during several exercises for decreased UT activation. Pt to be seen 1 more PT visit due to fall yesterday and to monitor symptoms before discharge. Patient will continue to benefit from skilled PT services to modify and progress therapeutic interventions, address functional mobility deficits, address ROM deficits, address strength deficits, analyze and address soft tissue restrictions, analyze and cue movement patterns, analyze and modify body mechanics/ergonomics and assess and modify postural abnormalities to attain remaining goals. [x]  See Plan of Care  []  See progress note/recertification  []  See Discharge Summary         Progress towards goals / Updated goals:    Short Term Goals: To be accomplished in 2-3 weeks:  1) Pt will be independent with initial HEP MET - 1x/day  2) Pt will report 25% decrease in exacerbation of symptoms Met  3) Pt will report use of ice at home to decrease inflammation/pain Met     Long Term Goals:  To be accomplished in 8-12 weeks:  1) Pt will perform resisted shoulder flex, scap without pain in order to lift grocery bags Met  2) Pt will report being able to lift grandchildren without pain in order to return to prior level of function Progressing towards  3) Pt will perform neck AROM all directions without pain in order to return to prior level of function Progressing towards    PLAN  []  Upgrade activities as tolerated     [x]  Continue plan of care  []  Update interventions per flow sheet       []  Discharge due to:_  []  Other: D/C next visit    Esther Jarrett PT 5/18/2017  9:54 AM

## 2017-05-25 ENCOUNTER — HOSPITAL ENCOUNTER (OUTPATIENT)
Dept: PHYSICAL THERAPY | Age: 77
Discharge: HOME | End: 2017-05-25
Payer: MEDICARE

## 2017-05-25 PROCEDURE — 97140 MANUAL THERAPY 1/> REGIONS: CPT | Performed by: PHYSICAL THERAPIST

## 2017-05-25 PROCEDURE — 97110 THERAPEUTIC EXERCISES: CPT | Performed by: PHYSICAL THERAPIST

## 2017-05-25 NOTE — PROGRESS NOTES
PT Progress NOTE - [facility] 2-15    Patient Name: Gino Crain  Date:2017  : 1940  [x]  Patient  Verified  Payor: [place] MEDICARE / Plan: [place] MEDICARE PART A & B / Product Type: Medicare /    In time: 9:30 AM  Out time: 10:10 AM  Total Treatment Time (min): 40  Total Timed Codes (min): 40  1:1 Treatment Time (MC only): 30 min  Visit #: 16    Treatment Area: Neck pain [M54.2]    SUBJECTIVE  Pain Level (0-10 scale): 4-5/10  Any medication changes, allergies to medications, adverse drug reactions, diagnosis change, or new procedure performed?: [x] No    [] Yes (see summary sheet for update)  Subjective functional status/changes:   [] No changes reported  Patient states she is feeling \"okay\". \"Sometimes at night I still have pain in my neck, shoulders. Not always though\".  She reports 100% improvement since beginning PT    OBJECTIVE    Modality rationale: decrease inflammation, decrease pain and increase tissue extensibility to improve the patients ability to play with grandchildren   Min Type Additional Details    [] Estim: []Att   []Unatt        []TENS instruct                  []IFC  []Premod   []NMES                     []Other:  []w/US   []w/ice   []w/heat  Position:  Location:    []  Traction: [] Cervical       []Lumbar                       [] Prone          []Supine                       []Intermittent   []Continuous Lbs:  [] before manual  [] after manual  []w/heat    []  Ultrasound: []Continuous   [] Pulsed at:                           []1MHz   []3MHz Location:  W/cm2:    [] Paraffin         Location:   []w/heat   -- [x]  Ice     []  Heat  []  Ice massage Position: supine, LE's elevated  Location: cervical     []  Laser  []  Other: Position:  Location:      []  Vasopneumatic Device Pressure:       [] lo [] med [] hi   Temperature:      [x] Skin assessment post-treatment:  [x]intact []redness- no adverse reaction    []redness  adverse reaction:     30 min Therapeutic Exercise:  [x] See flow sheet : Reassessment performed   Rationale: increase ROM and increase strength to improve the patients ability to perform ADL's      10 min Manual Therapy: STM B UT, B LS, SCM, cervical paraspinals  Suboccipital release x3   Rationale: decrease pain, increase ROM and increase tissue extensibility to improve the patients ability to exercise            With   [x] TE   [] TA   [] neuro   [] other: Patient Education: [x] Review HEP    [] Progressed/Changed HEP based on:   [] positioning:   [] body mechanics   [] transfers   [x] heat/ice application    [] other:      Other Objective/Functional Measures: Active Movements:   AROM Degrees Comments:pain, area   Forward flexion 38 Pain at end range   Extension 45 Pain at end range   Rotation right 60     Rotation left 55    SB right 32 Stretch on L   SB left 30 Pain       UE AROM: B shoulder flexion WNL      Strength (Upper Extremities):     L(0-5) R (0-5) Comments   Shoulder Flexion 5 5     Shoulder Scapion 5 5     Elbow Flexion  5 5     Elbow Extension  5 5             Palpation:  Mod TTP R LS, R suboccipitals    FOTO=  52/100 (65 at eval)     Pain Level (0-10 scale) post treatment: not reported    ASSESSMENT/Changes in Function:      Pt has completed 16 skilled PT visits. She has met 3/3 STG's and 1/3 LTG's. She scored 13 points lower on functional outcome survey, however reported 100% improvement in function since beginning PT. Pt continues to demonstrate TTP over bilateral LS, UT, cervical suboccipitals. She demonstrates improved strength and improved cervical ROM. She continues to have pain with majority of cervical motion. She is compliant with HEP and is ready for D/C due to lack of appreciable progress toward remaining goals. [x]  See Plan of Care  []  See progress note/recertification  []  See Discharge Summary         Progress towards goals / Updated goals:    Short Term Goals:  To be accomplished in 2-3 weeks:  1) Pt will be independent with initial HEP MET 2) Pt will report 25% decrease in exacerbation of symptoms MET  3) Pt will report use of ice at home to decrease inflammation/pain MET     Long Term Goals:  To be accomplished in 8-12 weeks:  1) Pt will perform resisted shoulder flex, scap without pain in order to lift grocery bags MET  2) Pt will report being able to lift grandchildren without pain in order to return to prior level of function not met  3) Pt will perform neck AROM all directions without pain in order to return to prior level of function not met    PLAN  D/C to [facility], PT 5/25/2017  9:35 AM

## 2017-06-24 ENCOUNTER — HOSPITAL ENCOUNTER (OUTPATIENT)
Dept: MRI IMAGING | Age: 77
Discharge: HOME | End: 2017-06-24
Attending: NEUROLOGICAL SURGERY
Payer: MEDICARE

## 2017-06-24 DIAGNOSIS — S16.1XXA CERVICAL STRAIN: ICD-10-CM

## 2017-06-24 DIAGNOSIS — M54.2 NECK PAIN: ICD-10-CM

## 2017-06-24 DIAGNOSIS — S06.0XAA CONCUSSION: ICD-10-CM

## 2017-06-24 PROCEDURE — 70551 MRI BRAIN STEM W/O DYE: CPT

## 2017-06-24 PROCEDURE — 72141 MRI NECK SPINE W/O DYE: CPT

## 2017-07-28 ENCOUNTER — HOSPITAL ENCOUNTER (OUTPATIENT)
Dept: PHYSICAL THERAPY | Age: 77
Discharge: HOME | End: 2017-07-28
Payer: MEDICARE

## 2017-07-28 PROCEDURE — G8985 CARRY GOAL STATUS: HCPCS | Performed by: PHYSICAL THERAPIST

## 2017-07-28 PROCEDURE — 97140 MANUAL THERAPY 1/> REGIONS: CPT | Performed by: PHYSICAL THERAPIST

## 2017-07-28 PROCEDURE — G8984 CARRY CURRENT STATUS: HCPCS | Performed by: PHYSICAL THERAPIST

## 2017-07-28 PROCEDURE — 97161 PT EVAL LOW COMPLEX 20 MIN: CPT | Performed by: PHYSICAL THERAPIST

## 2017-07-28 NOTE — PROGRESS NOTES
[facility]  [address]  [place], [address] [facility]  Phone: [phone]  Fax: [phone]    Plan of Care/Statement of Necessity for Physical Therapy Services  2-15    Patient name: Miriam Ramesh  : 1940  Provider#: [id number]  Referral source: Kaveh Momin III*      Medical/Treatment Diagnosis: Neck pain [M54.2]     Prior Hospitalization: see medical history     Comorbidities: see evaluation  Prior Level of Function: see evaluation  Medications: Verified on Patient Summary List  Start of Care: 17      Onset Date: 2017   The Plan of Care and following information is based on the information from the initial evaluation. Assessment/ key information: Pt is a 64year old female presenting with cervical pain and will benefit from PT to address problem list below.     Evaluation Complexity History MEDIUM  Complexity : 1-2 comorbidities / personal factors will impact the outcome/ POC ; Examination LOW Complexity : 1-2 Standardized tests and measures addressing body structure, function, activity limitation and / or participation in recreation  ;Presentation LOW Complexity : Stable, uncomplicated  ;Clinical Decision Making MEDIUM Complexity : FOTO score of 26-74  Overall Complexity Rating: LOW     Problem List: pain affecting function, decrease ROM, decrease strength, decrease ADL/ functional abilitiies, decrease activity tolerance and decrease flexibility/ joint mobility   Treatment Plan may include any combination of the following: Therapeutic exercise, Therapeutic activities, Neuromuscular re-education, Physical agent/modality, Manual therapy, Patient education, Self Care training and Functional mobility training  Patient / Family readiness to learn indicated by: asking questions, trying to perform skills and interest  Persons(s) to be included in education: patient (P)  Barriers to Learning/Limitations: None  Patient Goal (s): see evaluation  Patient Self Reported Health Status: good  Rehabilitation Potential: good    Short Term Goals: To be accomplished in 2-3 weeks:  1) Pt will be independent in initial HEP  2) Pt will report > or =25% decrease in exacerbation of symptoms     Long Term Goals: To be accomplished in 4-6 weeks:  1) Pt will perform cervical AROM all planes with no more than 2/10 pain in order to perform daily activities. 2) Pt will increase FOTO to at least 58/100 in order to demonstrate increase in functional mobility    Frequency / Duration: Patient to be seen 1-2 times per week for 4-6 weeks. Patient/ Caregiver education and instruction: self care, activity modification and exercises    [x]  Plan of care has been reviewed with PTA    G-Codes (GP)  Carry   Current  CK= 40-59%    Goal  CK= 40-59%    The severity rating is based on clinical judgment and the FOTO Score score. Certification Period: 7/28/17 - 10/21/17  Gisela Burgos, PT 7/28/2017 9:15 AM    ________________________________________________________________________    I certify that the above Therapy Services are being furnished while the patient is under my care. I agree with the treatment plan and certify that this therapy is necessary.     [de-identified] Signature:____________________  Date:____________Time: _________

## 2017-07-28 NOTE — PROGRESS NOTES
[facility]  [address], [address] [place] [address] Road  Phone: [phone]  Fax: [phone]    PT INITIAL EVALUATION NOTE - [facility] 2-15    Patient Name: Kayleigh Small  Date:2017  : 1940  [x]  Patient  Verified  Payor: Anastasiia Pelaez / Plan: [place] MEDICARE PART A & B / Product Type: Medicare /    In time: 9:15 AM  Out time:9:55 AM  Total Treatment Time (min): 40  Total Timed Codes (min): 30  1:1 Treatment Time ( [address] only): 15   Visit #: 1     Treatment Area: Neck pain [M54.2]    Subjective: Any medication changes, allergies to medications, adverse drug reactions, diagnosis change, or new procedure performed?: [] No    [x] Yes (see summary    Date of onset/injury:   Pt presents with cervical pain. Pain began when she fell down the stairs on 3/9/17 and hit her head- she reports losing consciousness. She went to hospital and had CT scan- per CC: 1. Left frontal scalp hematoma 2. Small focus of extra-axial hemorrhage adjacent to the left parietal lobe. Had a f/u CT scan on 3/18/17, per CC: Resolving left frontal scalp hematoma and small left subdural hematoma. She came to physical therapy for a few months and the pain decreased. Since last therapy bout, she reports the pain has been a dull, constant ache. Pain is less than it was when she fell, but it is still there. She went to see Dr. Zheng Jansen at [facility]-- took MRI-- \"told me it was arthritis\". Pain is worse at night. She sometimes takes pain meds. She has not been doing her exercises or stretches since last time she was in therapy. She uses ice occasionally     Pain:   7/10 max 3/10 min 3/10 now       Aggravated by: inc'd activity  Eased by: painkillers    Location and description of symptoms: cervical, R>L    Diagnostic Tests: [] Lab work [] X-rays    [] CT [x] MRI     [] Other:  Results (per report of the patient): see above    Social/Recreation/Work: 2 grandchildren- twins 1years old.  Enjoys playing with her grandchildren, gardening, bridge. She is from the [facility].     Prior level of function: able to lift, carry, move head without any pain     Patient goal(s): \"To be pain free\"      PMH: Significant for Diabetes type II    Headaches: Do you have headaches? [] Yes   [x] No    Objective:      Observation/posture: Active Movements:   AROM Degrees Comments:pain, area   Forward flexion 46 p! Extension 40 p! Rotation right 55 p! Rotation left 58 p! SB right 19 p! SB left 21 p! UE AROM: Full shoulder AROM flex, scap bilat. No pain    Strength (Upper Extremities):   L(0-5) R (0-5)   Shoulder Elevation (C2-4) 5 5   Shoulder Abduction (C5) 4+ 4+   Elbow Flexion (C6)  4+ 4+   Elbow Extension (C7) 4 4   Wrist Flexion (C7) 5 5   Wrist Extension (C6) 5 5   Thumb Extension (C8) NT NT   Finger Abduction/Inter.  (T1) NT NT     Palpation:   Mod TTP bilat UT, LS, cervical paraspinals, subocciptials    Special Tests:  Cervical:        Spurling's:   [] R    [] L    [] +    [x] -       Distraction:   [] R    [] L    [] +    [x] -       Compression:  [] R    [] L    [] +    [x] -    Muscle Flexibility: [] N/A   Scalenes: [] WNL    [x] Tight    [x] R    [x] L   Upper Trap: [] WNL    [x] Tight    [x] R    [x] L   Levator: [] WNL    [x] Tight    [x] R    [x] L    Outcome Measure: Patient presents with a FOTO Score of  54/100.      5 min Therapeutic Exercise:  [x] See flow sheet :   Rationale: increase ROM and increase strength to improve the patients ability to perform household chores    10 min Manual Therapy:  STM bilat UT, LS  Suboccipital release x2  Manual UT stretch    Rationale: decrease pain, increase ROM and increase tissue extensibility to improve the patients ability to lift, carry objects            With   [x] TE   [] TA   [] neuro   [] other: Patient Education: [x] Review HEP    [] Progressed/Changed HEP based on:   [] positioning   [] body mechanics   [] transfers   [x] heat/ice application    [x] other: PT POC; importance of continued use of ice or heat when necessary; encouraged pt to perform HEP to maximize benefit of PT     Pain Level (0-10 scale) post treatment: not reported    Assessment:  [x] See POC  [] Other:    Plan:   [x] See Jeevan Monroy, PT DPT     7/28/2017  9:15 AM

## 2017-08-08 ENCOUNTER — HOSPITAL ENCOUNTER (OUTPATIENT)
Dept: PHYSICAL THERAPY | Age: 77
Discharge: HOME | End: 2017-08-08
Payer: MEDICARE

## 2017-08-08 PROCEDURE — 97140 MANUAL THERAPY 1/> REGIONS: CPT | Performed by: PHYSICAL THERAPIST

## 2017-08-08 PROCEDURE — 97110 THERAPEUTIC EXERCISES: CPT | Performed by: PHYSICAL THERAPIST

## 2017-08-08 NOTE — PROGRESS NOTES
PT DAILY TREATMENT NOTE - [facility] 2-15    Patient Name: Silas Sarabia  Date:2017  : 1940  [x]  Patient  Verified  Payor: VA MEDICARE / Plan: VA MEDICARE PART A & B / Product Type: Medicare /    In time: 9:30 AM  Out time:10:25  Total Treatment Time (min): 55 (45 timed)  Total Timed Codes (min): 45  1:1 Treatment Time ( only): 30   Visit #: 2     Treatment Area: Neck pain [M54.2]    SUBJECTIVE  Pain Level (0-10 scale): 6/10  Any medication changes, allergies to medications, adverse drug reactions, diagnosis change, or new procedure performed?: [x] No    [] Yes (see summary sheet for update)  Subjective functional status/changes:   [] No changes reported  Pt states the neck pain is mostly right sided [pt points to R subocciptials]    OBJECTIVE    Modality rationale: decrease pain and increase tissue extensibility to improve the patients ability to perform ADL's without neck pain   Min Type Additional Details    [] Estim: []Att   []Unatt        []TENS instruct                  []IFC  []Premod   []NMES                     []Other:  []w/US   []w/ice   []w/heat  Position:  Location:    []  Traction: [] Cervical       []Lumbar                       [] Prone          []Supine                       []Intermittent   []Continuous Lbs:  [] before manual  [] after manual  []w/heat    []  Ultrasound: []Continuous   [] Pulsed at:                           []1MHz   []3MHz Location:  W/cm2:    [] Paraffin         Location:   []w/heat   10 [x]  Ice- post     []  Heat  []  Ice massage Position: supine, LE's elevated  Location:cervical spine    []  Laser  []  Other: Position:  Location:      []  Vasopneumatic Device Pressure:       [] lo [] med [] hi   Temperature:      [x] Skin assessment post-treatment:  [x]intact []redness- no adverse reaction    []redness  adverse reaction:     35 min Therapeutic Exercise:  [x] See flow sheet :    Rationale: increase ROM and increase strength to improve the patients ability to perform household chores without neck pain    10 min Manual Therapy: STM R>L UT, suboccipitals, levator scapula  Manual UT stretch    Rationale: decrease pain, increase ROM, increase tissue extensibility and decrease trigger points to improve the patients ability to care for grandchildren without neck pain          With   [] TE   [] TA   [] neuro   [] other: Patient Education: [x] Review HEP    [] Progressed/Changed HEP based on:   [] positioning   [] body mechanics   [] transfers   [] heat/ice application    [] other:      Other Objective/Functional Measures: n/a     Pain Level (0-10 scale) post treatment: 3/10    ASSESSMENT/Changes in Function:   Pt responded well to manual techniques-- decreased pain after manual. She tolerated strengthening exercises well- max cuing for decreased UT activation during tband rows. Patient will continue to benefit from skilled PT services to modify and progress therapeutic interventions, address functional mobility deficits, address ROM deficits, analyze and address soft tissue restrictions, analyze and cue movement patterns and analyze and modify body mechanics/ergonomics to attain remaining goals. []  See Plan of Care  []  See progress note/recertification  []  See Discharge Summary         Progress towards goals / Updated goals:  Short Term Goals: To be accomplished in 2-3 weeks:  1) Pt will be independent in initial HEP  2) Pt will report > or =25% decrease in exacerbation of symptoms      Long Term Goals: To be accomplished in 4-6 weeks:  1) Pt will perform cervical AROM all planes with no more than 2/10 pain in order to perform daily activities.   2) Pt will increase FOTO to at least 58/100 in order to demonstrate increase in functional mobility    PLAN  []  Upgrade activities as tolerated     []  Continue plan of care  []  Update interventions per flow sheet       []  Discharge due to:_  []  Other:_      Edelmira Del Cid, PT 8/8/2017  9:32 AM

## 2017-08-10 ENCOUNTER — HOSPITAL ENCOUNTER (OUTPATIENT)
Dept: PHYSICAL THERAPY | Age: 77
Discharge: HOME | End: 2017-08-10
Payer: MEDICARE

## 2017-08-10 PROCEDURE — 97140 MANUAL THERAPY 1/> REGIONS: CPT | Performed by: PHYSICAL THERAPY ASSISTANT

## 2017-08-10 PROCEDURE — 97110 THERAPEUTIC EXERCISES: CPT | Performed by: PHYSICAL THERAPY ASSISTANT

## 2017-08-10 NOTE — PROGRESS NOTES
PT DAILY TREATMENT NOTE - [facility] 2-15    Patient Name: Yazan Lui  Date:8/10/2017  : 1940  [x]  Patient  Verified  Payor: VA MEDICARE / Plan: VA MEDICARE PART A & B / Product Type: Medicare /    In time: 8:30 AM  Out time:9:30 AM  Total Treatment Time (min): 60   Total Timed Codes (min): 50  1:1 Treatment Time ([facility] only): 30   Visit #: 3     Treatment Area: Neck pain [M54.2]    SUBJECTIVE  Pain Level (0-10 scale): 2/10  Any medication changes, allergies to medications, adverse drug reactions, diagnosis change, or new procedure performed?: [x] No    [] Yes (see summary sheet for update)  Subjective functional status/changes:   [] No changes reported  \"Doing better today but still pain on the R side. \"    OBJECTIVE    Modality rationale: decrease pain and increase tissue extensibility to improve the patients ability to perform ADL's without neck pain   Min Type Additional Details    [] Estim: []Att   []Unatt        []TENS instruct                  []IFC  []Premod   []NMES                     []Other:  []w/US   []w/ice   []w/heat  Position:  Location:    []  Traction: [] Cervical       []Lumbar                       [] Prone          []Supine                       []Intermittent   []Continuous Lbs:  [] before manual  [] after manual  []w/heat    []  Ultrasound: []Continuous   [] Pulsed at:                           []1MHz   []3MHz Location:  W/cm2:    [] Paraffin         Location:   []w/heat   10 [x]  Ice- post     []  Heat  []  Ice massage Position: supine, LE's elevated  Location:cervical spine    []  Laser  []  Other: Position:  Location:      []  Vasopneumatic Device Pressure:       [] lo [] med [] hi   Temperature:      [x] Skin assessment post-treatment:  [x]intact []redness- no adverse reaction    []redness  adverse reaction:     40 min Therapeutic Exercise:  [x] See flow sheet : added PG WB walkout with hold, s/l ER bilaterally using 1 pound weight   Rationale: increase ROM and increase strength to improve the patients ability to perform household chores without neck pain    10 min Manual Therapy: STM R>L UT, suboccipitals, levator scapula  Manual UT stretch    Rationale: decrease pain, increase ROM, increase tissue extensibility and decrease trigger points to improve the patients ability to care for grandchildren without neck pain          With   [] TE   [] TA   [] neuro   [] other: Patient Education: [x] Review HEP    [] Progressed/Changed HEP based on:   [] positioning   [] body mechanics   [] transfers   [] heat/ice application    [] other:      Other Objective/Functional Measures: TTP R UT, cervical paraspinals and sub occipital region. Pain Level (0-10 scale) post treatment: 0/10    ASSESSMENT/Changes in Function:   Muscle soreness noted after s/l shoulder ER and PG walkouts but no reports of pain. Continues to be tender along R UT, cervical paraspinals and sub occipital region. Decreased pain level with treatment. Patient will continue to benefit from skilled PT services to modify and progress therapeutic interventions, address functional mobility deficits, address ROM deficits, analyze and address soft tissue restrictions, analyze and cue movement patterns and analyze and modify body mechanics/ergonomics to attain remaining goals. []  See Plan of Care  []  See progress note/recertification  []  See Discharge Summary         Progress towards goals / Updated goals:  Short Term Goals: To be accomplished in 2-3 weeks:  1) Pt will be independent in initial HEP  2) Pt will report > or =25% decrease in exacerbation of symptoms      Long Term Goals: To be accomplished in 4-6 weeks:  1) Pt will perform cervical AROM all planes with no more than 2/10 pain in order to perform daily activities.   2) Pt will increase FOTO to at least 58/100 in order to demonstrate increase in functional mobility    PLAN  []  Upgrade activities as tolerated     []  Continue plan of care  []  Update interventions per flow sheet       []  Discharge due to:_  []  Other:_      Jaswinder Fair, NIDHI 8/10/2017  8:30 AM

## 2017-08-15 ENCOUNTER — APPOINTMENT (OUTPATIENT)
Dept: PHYSICAL THERAPY | Age: 77
End: 2017-08-15
Payer: MEDICARE

## 2017-08-18 ENCOUNTER — HOSPITAL ENCOUNTER (OUTPATIENT)
Dept: PHYSICAL THERAPY | Age: 77
Discharge: HOME | End: 2017-08-18
Payer: MEDICARE

## 2017-08-18 PROCEDURE — 97110 THERAPEUTIC EXERCISES: CPT | Performed by: PHYSICAL THERAPY ASSISTANT

## 2017-08-18 PROCEDURE — 97140 MANUAL THERAPY 1/> REGIONS: CPT | Performed by: PHYSICAL THERAPY ASSISTANT

## 2017-08-18 NOTE — PROGRESS NOTES
PT DAILY TREATMENT NOTE - [facility] 2-15    Patient Name: Esperanza Ann  Date:2017  : 1940  [x]  Patient  Verified  Payor: VA MEDICARE / Plan: VA MEDICARE PART A & B / Product Type: Medicare /    In time: 9:05 AM  Out time:9:55 AM  Total Treatment Time (min): 55   Total Timed Codes (min): 45  1:1 Treatment Time ( [address] only): 40   Visit #: 4     Treatment Area: Neck pain [M54.2]    SUBJECTIVE  Pain Level (0-10 scale): 0/10  Any medication changes, allergies to medications, adverse drug reactions, diagnosis change, or new procedure performed?: [x] No    [] Yes (see summary sheet for update)  Subjective functional status/changes:   [] No changes reported  Patient reports she continues to have R sided neck pain but notices it mainly at the end of the day. \"It's better than it was. \"    OBJECTIVE    Modality rationale: decrease pain and increase tissue extensibility to improve the patients ability to perform ADL's without neck pain   Min Type Additional Details    [] Estim: []Att   []Unatt        []TENS instruct                  []IFC  []Premod   []NMES                     []Other:  []w/US   []w/ice   []w/heat  Position:  Location:    []  Traction: [] Cervical       []Lumbar                       [] Prone          []Supine                       []Intermittent   []Continuous Lbs:  [] before manual  [] after manual  []w/heat    []  Ultrasound: []Continuous   [] Pulsed at:                           []1MHz   []3MHz Location:  W/cm2:    [] Paraffin         Location:   []w/heat   10 [x]  Ice- post     []  Heat  []  Ice massage Position: supine, LE's elevated  Location:cervical spine    []  Laser  []  Other: Position:  Location:      []  Vasopneumatic Device Pressure:       [] lo [] med [] hi   Temperature:      [x] Skin assessment post-treatment:  [x]intact []redness- no adverse reaction    []redness  adverse reaction:     35 min Therapeutic Exercise:  [x] See flow sheet :    Rationale: increase ROM and increase strength to improve the patients ability to perform household chores without neck pain    10 min Manual Therapy: STM R>L UT, suboccipitals, levator scapula  Manual UT stretch    Rationale: decrease pain, increase ROM, increase tissue extensibility and decrease trigger points to improve the patients ability to care for grandchildren without neck pain          With   [] TE   [] TA   [] neuro   [] other: Patient Education: [x] Review HEP    [] Progressed/Changed HEP based on:   [] positioning   [] body mechanics   [] transfers   [] heat/ice application    [] other:      Other Objective/Functional Measures:      Pain Level (0-10 scale) post treatment: 0/10    ASSESSMENT/Changes in Function:   Cues to slow down during reps with TB shoulder series for optimal muscle facilitation. Improved form/technique with supine SA punches. Patient will continue to benefit from skilled PT services to modify and progress therapeutic interventions, address functional mobility deficits, address ROM deficits, analyze and address soft tissue restrictions, analyze and cue movement patterns and analyze and modify body mechanics/ergonomics to attain remaining goals. []  See Plan of Care  []  See progress note/recertification  []  See Discharge Summary         Progress towards goals / Updated goals:  Short Term Goals: To be accomplished in 2-3 weeks:  1) Pt will be independent in initial HEP  2) Pt will report > or =25% decrease in exacerbation of symptoms      Long Term Goals: To be accomplished in 4-6 weeks:  1) Pt will perform cervical AROM all planes with no more than 2/10 pain in order to perform daily activities.   2) Pt will increase FOTO to at least 58/100 in order to demonstrate increase in functional mobility    PLAN  []  Upgrade activities as tolerated     []  Continue plan of care  []  Update interventions per flow sheet       []  Discharge due to:_  []  Other:_      Jayla Mercado, NIDHI 8/18/2017  9:05 AM

## 2017-08-22 ENCOUNTER — APPOINTMENT (OUTPATIENT)
Dept: PHYSICAL THERAPY | Age: 77
End: 2017-08-22
Payer: MEDICARE

## 2017-08-24 ENCOUNTER — HOSPITAL ENCOUNTER (OUTPATIENT)
Dept: PHYSICAL THERAPY | Age: 77
Discharge: HOME | End: 2017-08-24
Payer: MEDICARE

## 2017-08-24 PROCEDURE — 97110 THERAPEUTIC EXERCISES: CPT | Performed by: PHYSICAL THERAPIST

## 2017-08-24 PROCEDURE — 97140 MANUAL THERAPY 1/> REGIONS: CPT | Performed by: PHYSICAL THERAPIST

## 2017-08-24 NOTE — PROGRESS NOTES
PT DAILY TREATMENT NOTE - [facility] 2-15    Patient Name: Emilie Leon  Date:2017  : 1940  [x]  Patient  Verified  Payor: [place] MEDICARE / Plan: [place] MEDICARE PART A & B / Product Type: Medicare /    In time: 9:30 AM  Out time: 10:40 AM  Total Treatment Time (min): 70   Total Timed Codes (min): 60  1:1 Treatment Time ([facility] only): 40   Visit #: 5    Treatment Area: Neck pain [M54.2]    SUBJECTIVE  Pain Level (0-10 scale): 3/10  Any medication changes, allergies to medications, adverse drug reactions, diagnosis change, or new procedure performed?: [x] No    [] Yes (see summary sheet for update)  Subjective functional status/changes:   [] No changes reported  Patient states \"much, much better. There are a lot of times when I feel nothing\"    OBJECTIVE    Modality rationale: decrease pain and increase tissue extensibility to improve the patients ability to perform ADL's without neck pain   Min Type Additional Details    [] Estim: []Att   []Unatt        []TENS instruct                  []IFC  []Premod   []NMES                     []Other:  []w/US   []w/ice   []w/heat  Position:  Location:    []  Traction: [] Cervical       []Lumbar                       [] Prone          []Supine                       []Intermittent   []Continuous Lbs:  [] before manual  [] after manual  []w/heat    []  Ultrasound: []Continuous   [] Pulsed at:                           []1MHz   []3MHz Location:  W/cm2:    [] Paraffin         Location:   []w/heat   10 [x]  Ice- post     []  Heat  []  Ice massage Position: supine, LE's elevated  Location:cervical spine    []  Laser  []  Other: Position:  Location:      []  Vasopneumatic Device Pressure:       [] lo [] med [] hi   Temperature:      [x] Skin assessment post-treatment:  [x]intact []redness- no adverse reaction    []redness  adverse reaction:     50 min Therapeutic Exercise:  [x] See flow sheet : added corner stretch.  Progressed strengthening   Rationale: increase ROM and increase strength to improve the patients ability to perform household chores without neck pain    10 min Manual Therapy: STM R>L UT, suboccipitals, levator scapula  Manual UT stretch    Rationale: decrease pain, increase ROM, increase tissue extensibility and decrease trigger points to improve the patients ability to care for grandchildren without neck pain          With   [] TE   [] TA   [] neuro   [] other: Patient Education: [x] Review HEP    [] Progressed/Changed HEP based on:   [] positioning   [] body mechanics   [] transfers   [] heat/ice application    [] other:      Other Objective/Functional Measures:      Pain Level (0-10 scale) post treatment: 0/10    ASSESSMENT/Changes in Function:   Cues for decreased UT activation during tband rows. Decreased symptoms after manual techniques. Patient will continue to benefit from skilled PT services to modify and progress therapeutic interventions, address functional mobility deficits, address ROM deficits, analyze and address soft tissue restrictions, analyze and cue movement patterns and analyze and modify body mechanics/ergonomics to attain remaining goals. []  See Plan of Care  []  See progress note/recertification  []  See Discharge Summary         Progress towards goals / Updated goals:  Short Term Goals: To be accomplished in 2-3 weeks:  1) Pt will be independent in initial HEP  2) Pt will report > or =25% decrease in exacerbation of symptoms      Long Term Goals: To be accomplished in 4-6 weeks:  1) Pt will perform cervical AROM all planes with no more than 2/10 pain in order to perform daily activities.   2) Pt will increase FOTO to at least 58/100 in order to demonstrate increase in functional mobility    PLAN  []  Upgrade activities as tolerated     []  Continue plan of care  []  Update interventions per flow sheet       []  Discharge due to:_  []  Other:_      Andrea Le, PT 8/24/2017  9:40 AM

## 2017-08-29 ENCOUNTER — HOSPITAL ENCOUNTER (OUTPATIENT)
Dept: PHYSICAL THERAPY | Age: 77
Discharge: HOME | End: 2017-08-29
Payer: MEDICARE

## 2017-08-29 PROCEDURE — 97110 THERAPEUTIC EXERCISES: CPT | Performed by: PHYSICAL THERAPY ASSISTANT

## 2017-08-29 PROCEDURE — 97110 THERAPEUTIC EXERCISES: CPT | Performed by: PHYSICAL THERAPIST

## 2017-08-29 PROCEDURE — 97140 MANUAL THERAPY 1/> REGIONS: CPT | Performed by: PHYSICAL THERAPIST

## 2017-08-31 ENCOUNTER — HOSPITAL ENCOUNTER (OUTPATIENT)
Dept: PHYSICAL THERAPY | Age: 77
Discharge: HOME | End: 2017-08-31
Payer: MEDICARE

## 2017-08-31 PROCEDURE — G8985 CARRY GOAL STATUS: HCPCS | Performed by: PHYSICAL THERAPIST

## 2017-08-31 PROCEDURE — 97140 MANUAL THERAPY 1/> REGIONS: CPT | Performed by: PHYSICAL THERAPIST

## 2017-08-31 PROCEDURE — G8986 CARRY D/C STATUS: HCPCS | Performed by: PHYSICAL THERAPIST

## 2017-08-31 PROCEDURE — 97110 THERAPEUTIC EXERCISES: CPT | Performed by: PHYSICAL THERAPIST

## 2017-08-31 NOTE — PROGRESS NOTES
PT DAILY TREATMENT/PRogress NOTE - [facility] 2-15    Patient Name: Kia Beltran  Date:2017  : 1940  [x]  Patient  Verified  Payor: [place] MEDICARE / Plan: VA MEDICARE PART A & B / Product Type: Medicare /    In time: 9:30 AM  Out time:  10:15 AM  Total Treatment Time (min): 45   Total Timed Codes (min): 45  1:1 Treatment Time ([facility] only): 39   Visit #: 7    Treatment Area: Neck pain [M54.2]    SUBJECTIVE  Pain Level (0-10 scale): 4/10  Any medication changes, allergies to medications, adverse drug reactions, diagnosis change, or new procedure performed?: [x] No    [] Yes (see summary sheet for update)  Subjective functional status/changes:   [] No changes reported  Patient states \"I really am much better, just have more pain in the past few days. Not sure what I did\"  She states 95% improvement.     OBJECTIVE    Modality rationale: decrease pain and increase tissue extensibility to improve the patients ability to perform ADL's without neck pain   Min Type Additional Details    [] Estim: []Att   []Unatt        []TENS instruct                  []IFC  []Premod   []NMES                     []Other:  []w/US   []w/ice   []w/heat  Position:  Location:    []  Traction: [] Cervical       []Lumbar                       [] Prone          []Supine                       []Intermittent   []Continuous Lbs:  [] before manual  [] after manual  []w/heat    []  Ultrasound: []Continuous   [] Pulsed at:                           []1MHz   []3MHz Location:  W/cm2:    [] Paraffin         Location:   []w/heat   Pt decblined [x]  Ice- post     []  Heat  []  Ice massage Position: supine, LE's elevated  Location:cervical spine    []  Laser  []  Other: Position:  Location:      []  Vasopneumatic Device Pressure:       [] lo [] med [] hi   Temperature:      [x] Skin assessment post-treatment:  [x]intact []redness- no adverse reaction    []redness  adverse reaction:     35 min Therapeutic Exercise:  [x] See flow sheet : Reassessment performed. Reviewed HEP. Rationale: increase ROM and increase strength to improve the patients ability to perform household chores without neck pain    10 min Manual Therapy: STM R>L UT, suboccipitals, levator scapula  Manual UT stretch    Rationale: decrease pain, increase ROM, increase tissue extensibility and decrease trigger points to improve the patients ability to care for grandchildren without neck pain          With   [] TE   [] TA   [] neuro   [] other: Patient Education: [x] Review HEP    [] Progressed/Changed HEP based on:   [] positioning   [] body mechanics   [] transfers   [] heat/ice application    [] other:      Other Objective/Functional Measures: Active Movements:   AROM Degrees Comments:pain, area   Forward flexion 46 \"stretch\"   Extension 42 No pain   Rotation right 75 No pain   Rotation left 75 No pain   SB right 31 No pain   SB left 28 \"I feel it\" -- pt points to R UT      UE AROM: Full shoulder AROM flex, scap bilat. No pain     Strength (Upper Extremities):    L(0-5) R (0-5)   Shoulder Elevation (C2-4) 5 5   Shoulder Abduction (C5) 4+ 4+   Elbow Flexion (C6)  4+ 4+   Elbow Extension (C7) 4 4   Wrist Flexion (C7) 5 5   Wrist Extension (C6) 5 5      Palpation:   Min TTP bilat UT, LS, cervical paraspinals, subocciptials    FOTO= 57/100 (54 at eval)    Pain Level (0-10 scale) post treatment: 0/10    ASSESSMENT/Changes in Function:   Pt has completed 7 skilled PT visits. She has met 2/2 STG's and 1/2 LTG's. She demonstrates improvement in cervical AROM, decreased pain levels, and decreased tenderness over cervical musculature. She scored 3 points higher on functional outcome survey indicating increase in functional mobility and states 95% improvement since beginning PT. Reviewed HEP today and pt given handout for massage therapy. Pt compliant with HEP and ready for D/C to HEP.     []  See Plan of Care  []  See progress note/recertification  [x]  See Discharge Summary    G-Codes (GP)  Carry  Goal  CJ= 20-39%   D/C  CK= 40-59%         Progress towards goals / Updated goals:  Short Term Goals: To be accomplished in 2-3 weeks:  1) Pt will be independent in initial HEP MET  2) Pt will report > or =25% decrease in exacerbation of symptoms MET     Long Term Goals: To be accomplished in 4-6 weeks:  1) Pt will perform cervical AROM all planes with no more than 2/10 pain in order to perform daily activities.  MET  2) Pt will increase FOTO to at least 58/100 in order to demonstrate increase in functional mobility not met    PLAN  D/C to [facility], PT 8/31/2017  9:36 AM

## 2017-09-05 NOTE — ANCILLARY DISCHARGE INSTRUCTIONS
[facility]  [facility], [address] [place] [address]  Phone: [phone]  Fax: [phone]    Discharge Summary  2-15    Patient name: Kunal Cortés  : 1940  Provider#:[id number]  Referral source: Jossie Ibrahim III*      Medical/Treatment Diagnosis: Neck pain [M54.2]     Prior Hospitalization: see medical history     Comorbidities: see evaluation  Prior Level of Function:see evaluation  Medications: Verified on Patient Summary List    Start of Care: 17      Onset Date:see evaluation   Visits from Start of Care: 7     Missed Visits: see CC  Reporting Period : 17 to 17    Summary of care: Active Movements:   AROM Degrees Comments:pain, area   Forward flexion 46 \"stretch\"   Extension 42 No pain   Rotation right 75 No pain   Rotation left 75 No pain   SB right 31 No pain   SB left 28 \"I feel it\" -- pt points to R UT      UE AROM: Full shoulder AROM flex, scap bilat. No pain      Strength (Upper Extremities):     L(0-5) R (0-5)   Shoulder Elevation (C2-4) 5 5   Shoulder Abduction (C5) 4+ 4+   Elbow Flexion (C6)  4+ 4+   Elbow Extension (C7) 4 4   Wrist Flexion (C7) 5 5   Wrist Extension (C6) 5 5       Palpation:   Min TTP bilat UT, LS, cervical paraspinals, subocciptials     FOTO= 57/100 (54 at eval)     Progress towards goals:  Short Term Goals: To be accomplished in 2-3 weeks:  1) Pt will be independent in initial HEP MET  2) Pt will report > or =25% decrease in exacerbation of symptoms MET      Long Term Goals: To be accomplished in 4-6 weeks:  1) Pt will perform cervical AROM all planes with no more than 2/10 pain in order to perform daily activities. MET  2) Pt will increase FOTO to at least 58/100 in order to demonstrate increase in functional mobility not met     ASSESSMENT/Changes in Function:   Pt has completed 7 skilled PT visits. She has met 2/2 STG's and 1/2 LTG's.  She demonstrates improvement in cervical AROM, decreased pain levels, and decreased tenderness over cervical musculature. She scored 3 points higher on functional outcome survey indicating increase in functional mobility and states 95% improvement since beginning PT. Reviewed HEP today and pt given handout for massage therapy.  Pt compliant with HEP and ready for D/C to HEP.     G-Codes (GP)  Carry    Goal  CJ= 20-39%   D/C  CK= 40-59%      RECOMMENDATIONS:  [x]Discontinue therapy: [x]Patient has reached or is progressing toward set goals      []Patient is non-compliant or has abdicated      []Due to lack of appreciable progress towards set goals    Melinda Brambila, PT 9/5/2017 9:36 AM

## 2018-09-06 ENCOUNTER — HOSPITAL ENCOUNTER (OUTPATIENT)
Dept: GENERAL RADIOLOGY | Age: 78
Discharge: HOME | End: 2018-09-06
Payer: MEDICARE

## 2018-09-06 DIAGNOSIS — M16.9 OSTEOARTHRITIS OF HIP: ICD-10-CM

## 2018-09-06 PROCEDURE — 73502 X-RAY EXAM HIP UNI 2-3 VIEWS: CPT

## 2018-10-25 ENCOUNTER — HOSPITAL ENCOUNTER (OUTPATIENT)
Dept: PHYSICAL THERAPY | Age: 78
Discharge: HOME | End: 2018-10-25
Payer: MEDICARE

## 2018-10-25 PROCEDURE — G8978 MOBILITY CURRENT STATUS: HCPCS | Performed by: PHYSICAL THERAPIST

## 2018-10-25 PROCEDURE — G8979 MOBILITY GOAL STATUS: HCPCS | Performed by: PHYSICAL THERAPIST

## 2018-10-25 PROCEDURE — 97161 PT EVAL LOW COMPLEX 20 MIN: CPT | Performed by: PHYSICAL THERAPIST

## 2018-10-25 PROCEDURE — 97110 THERAPEUTIC EXERCISES: CPT | Performed by: PHYSICAL THERAPIST

## 2018-10-25 NOTE — PROGRESS NOTES
[address] [facility] [address], [place], [address] Phone: [phone]  Fax: [phone] Evaluation Name:  Josue Farley                        Age: 1940                  Gender: female Referrer:  Carmela Koenig MD                  Dx:  Hip pain [M25.559]          Date: 10/25/2018 In time: 8:25 AM  Out time:9:00 AM 
Total Treatment Time (min): 35 Total Timed Codes (min): 10 
1:1 Treatment Time ([facility] only): 35 Visit #: 1 Subjective: 
Current symptoms/chief complaint and date of onset/injury: Pt presents with referral to PT for L hip OA, repeated falls. She has fallen 3x in the past 6 months. \"My balance and strength isn't good\". She used to belong to the [facility]-- has not gone in the last year so quit her membership. Occasional pain in L hip-- today she states no pain in hip. She has a cane at home, however does not use it. Pt reports needing to leave by 9:10 AM to make another MD appt at 9:30 AM. Aggravated by: Inc'd activity, occasionally sleeping on L hip Eased by:  walking Pain:   8/10 max 2/10 min 2/10 now Location of symptoms: L hip, balance Diagnostic Tests: [] Lab work [x] X-rays    [] CT [] MRI     [] Other: 
Results (per report of the patient): arthritis PMHX: Significant for diabetes, hearing impaired Social/Recreation/Work:2 grandchildren- twins 3years old. Enjoys playing with her grandchildren, gardening, bridge. She is from the [facility]. Prior level of function: gradual progression of hip pain, dec'd balaance Patient goal(s): \"to have better balance and less pain\" Objective: 
 
Posture:  
Fair standing/sitting posture Gait: 
Pt ambulated approx 100 ft in clinic without AD. No significant gait abnormalities. Slight hip drop bilat Strength (1-5) Right Left Hip flexion 4- 4-  
Hip abd 3+ 3+ Knee flex 4+ 4+ Knee ext 4+ 4+ Pt able to perform 100% of supine bridge Pt able to perform 100% of supine SLR with difficulty Functional Biomechanical Screen SLS: 
R) 10 sec. Min to mod postural sway L) 6 sec. Mod to max postural sway with use of hip strategy Tandem stance: 
15 sec ea direct. Mod postural sway Mod CTSIB Condition 1) 30 sec. Min to no postural sway Condition 2) 16 sec. Eyes open. Mod to max postural sway Condition 3) 30 sec. Min to mod postural sway Condition 4) 1 sec. LOB to L-- eyes open, arms uncrossed to provide UE support Palpation Mod TTP L GT, L glute max/med Optional Tests Scour Test:  [x] Neg    [] Pos Long axis hip distaction on L: neg for relief HS tight bilat, L>R Outcome Measure: Patient presents with a FOTO score of 50.   
 
10 min Therapeutic Exercise:  [x] See flow sheet : [place], SLR Rationale: increase ROM, increase strength, improve coordination, improve balance and increase proprioception to improve the patients ability to jevon daily activities with inc'd safety With 
 [x] TE 
 [] TA 
 [] neuro 
 [] other: Patient Education: [x] Review HEP [] Progressed/Changed HEP based on:  
[x] positioning   [] body mechanics   [] transfers   [x] heat/ice application   
[x] other: lee/phys; PT POC; importance of performing HEP in order to maximize benefit of PT; encouraged pt to walk with /daughter to tolerance-- approx 10-15 min 4-5 days/wk Assessment: See POC Plan: See POC Chantal Mcleod, PT, DPT   
10/25/2018

## 2018-10-25 NOTE — PROGRESS NOTES
[facility] [address] Phone: [phone]  Fax: [phone] Plan of Care/Statement of Necessity for Physical Therapy Services  2-15 Patient name: Sophie Doss  : 1940  Provider#: [id number] Referral source: Nena Jauregui MD     
Medical/Treatment Diagnosis: Hip pain [M25.559] Prior Hospitalization: see medical history Comorbidities: see evaluation Prior Level of Function: see evaluation Medications: Verified on Patient Summary List 
Start of Care: 10/25/18      Onset Date: gradual onset, chronic The Plan of Care and following information is based on the information from the initial evaluation. Assessment/ key information: Pt is a 76year old female presenting with L hip pain and balance dysfunction. She will benefit from PT to address problem list below Evaluation Complexity History MEDIUM  Complexity : 1-2 comorbidities / personal factors will impact the outcome/ POC ; Examination LOW Complexity : 1-2 Standardized tests and measures addressing body structure, function, activity limitation and / or participation in recreation  ;Presentation LOW Complexity : Stable, uncomplicated  ;Clinical Decision Making MEDIUM Complexity : FOTO score of 26-74 Overall Complexity Rating: LOW Problem List: pain affecting function, decrease strength, impaired gait/ balance, decrease ADL/ functional abilitiies, decrease activity tolerance and decrease flexibility/ joint mobility Treatment Plan may include any combination of the following: Therapeutic exercise, Therapeutic activities, Neuromuscular re-education, Physical agent/modality, Gait/balance training, Manual therapy, Patient education, Self Care training, Functional mobility training and Home safety training Patient / Family readiness to learn indicated by: asking questions, trying to perform skills and interest 
Persons(s) to be included in education: patient (P) Barriers to Learning/Limitations: None Patient Goal (s): see evaluation Patient Self Reported Health Status: good Rehabilitation Potential: good Short Term Goals: To be accomplished in 2-3 weeks: 
1) Pt will be independent in initial HEP 
2) Pt will report >/=25% decrease in exacerbation of symptoms Long Term Goals: To be accomplished in 4-6 weeks: 
1) Pt will improve bilat hip abd strength to >/=4/5 in order to aid in exercise routine 2) Pt will return to mod exercise routine at gym without exacerbation of sx 3) Pt will report >/=75% decrease in exacerbation of symptoms 4) Pt will improve FOTO score to >/=61/100 in order to demonstrate improvement in functional mobility 5) Pt will perform SLS for >/=10 sec each LE in order to demonstrate improvement in balance Frequency / Duration: Patient to be seen 1-2 times per week for 4-6 weeks. Patient/ Caregiver education and instruction: self care, activity modification and exercises 
 
[x]  Plan of care has been reviewed with PTA 
 
G-Codes (GP) Mobility  Current  CK= 40-59%   Goal  CJ= 20-39% The severity rating is based on clinical judgment and the FOTO Score . Certification Period: 10/25/18- 1/20/19 Lisseth Muniz, PT 10/25/2018 8:24 AM 
 
________________________________________________________________________ I certify that the above Therapy Services are being furnished while the patient is under my care. I agree with the treatment plan and certify that this therapy is necessary. [de-identified] Signature:____________________  Date:____________Time: _________

## 2018-10-30 ENCOUNTER — HOSPITAL ENCOUNTER (OUTPATIENT)
Dept: PHYSICAL THERAPY | Age: 78
Discharge: HOME | End: 2018-10-30
Payer: MEDICARE

## 2018-10-30 PROCEDURE — 97110 THERAPEUTIC EXERCISES: CPT | Performed by: PHYSICAL THERAPIST

## 2018-10-30 PROCEDURE — 97140 MANUAL THERAPY 1/> REGIONS: CPT | Performed by: PHYSICAL THERAPIST

## 2018-10-30 NOTE — PROGRESS NOTES
PT DAILY TREATMENT NOTE - [facility] 2-15 Patient Name: Indy Basurto Date:10/30/2018 : 1940 [x]  Patient  Verified Payor: [place] MEDICARE / Plan: Ludin Payan / Product Type: Medicare / In time:830 AM  Out time: 9:25 AM 
Total Treatment Time (min): 55 Total Timed Codes (min): 45 
1:1 Treatment Time ([facility] only): 30 Visit #: 2 Treatment Area: Hip pain [M25.559] SUBJECTIVE Pain Level (0-10 scale): 5/10 Any medication changes, allergies to medications, adverse drug reactions, diagnosis change, or new procedure performed?: [x] No    [] Yes (see summary sheet for update) Subjective functional status/changes:   [] No changes reported Pt reports inc'd symptoms in L hip today. She states she has been busy so has only done exercises one time. OBJECTIVE Modality rationale: decrease pain to improve the patients ability to perform ADL's, walk, stand with dec'd symptoms Min Type Additional Details  
 
 [] Estim: []Att   []Unatt    []TENS instruct []IFC  []Premod   []NMES []Other:  []w/US   []w/ice   []w/heat Position: Location:  
 
 []  Traction: [] Cervical       []Lumbar 
                     [] Prone          []Supine []Intermittent   []Continuous Lbs: 
[] before manual 
[] after manual 
[]w/heat  
 []  Ultrasound: []Continuous   [] Pulsed  
                    at: []1MHz   []3MHz Location: 
W/cm2:  
 [] Paraffin Location:  
[]w/heat  
10 [x]  Ice     []  Heat 
[]  Ice massage Position: sidelying on R Location: L hip  
 []  Laser 
[]  Other: Position: Location:  
 
 []  Vasopneumatic Device Pressure:       [] lo [] med [] hi  
Temperature:   
 
[x] Skin assessment post-treatment:  [x]intact []redness- no adverse reaction 
  []redness  adverse reaction:  
 
37 min Therapeutic Exercise:  [x] See flow sheet : reviewed HEP; added per flow sheet Rationale: increase ROM, increase strength, improve coordination, improve balance and increase proprioception to improve the patients ability to exercise, walk, stand with dec'd symptoms 8 min Manual Therapy:  Long axis hip distraction on L 
STM L glute max/med Rationale: decrease pain, increase tissue extensibility and decrease trigger points to improve the patients ability to perform daily chores with dec'd symptoms With 
 [] TE 
 [] TA 
 [] neuro 
 [] other: Patient Education: [x] Review HEP [] Progressed/Changed HEP based on:  
[] positioning   [] body mechanics   [] transfers   [] heat/ice application   
[] other:   
 
Other Objective/Functional Measures: Mod to severe TTP L glute max/med with light touch Pain Level (0-10 scale) post treatment: 0/10 ASSESSMENT/Changes in Function:  
Pt fatigued at end of visit. Strongly encouraged performance of HEP in order to maximize benefit of PT. Patient will continue to benefit from skilled PT services to modify and progress therapeutic interventions, address functional mobility deficits, address ROM deficits, address strength deficits, analyze and address soft tissue restrictions, analyze and cue movement patterns, analyze and modify body mechanics/ergonomics, assess and modify postural abnormalities and address imbalance/dizziness to attain remaining goals. []  See Plan of Care 
[]  See progress note/recertification 
[]  See Discharge Summary Progress towards goals / Updated goals: 
nt 
 
PLAN 
[]  Upgrade activities as tolerated     [x]  Continue plan of care 
[]  Update interventions per flow sheet      
[]  Discharge due to:_ 
[]  Other:_   
 
Francisco J Tobias PT 10/30/2018  8:31 AM

## 2018-11-01 ENCOUNTER — HOSPITAL ENCOUNTER (OUTPATIENT)
Dept: PHYSICAL THERAPY | Age: 78
Discharge: HOME | End: 2018-11-01
Payer: MEDICARE

## 2018-11-01 PROCEDURE — 97110 THERAPEUTIC EXERCISES: CPT | Performed by: PHYSICAL THERAPIST

## 2018-11-01 PROCEDURE — 97140 MANUAL THERAPY 1/> REGIONS: CPT | Performed by: PHYSICAL THERAPIST

## 2018-11-06 ENCOUNTER — HOSPITAL ENCOUNTER (OUTPATIENT)
Dept: PHYSICAL THERAPY | Age: 78
Discharge: HOME | End: 2018-11-06
Payer: MEDICARE

## 2018-11-06 PROCEDURE — 97140 MANUAL THERAPY 1/> REGIONS: CPT | Performed by: PHYSICAL THERAPIST

## 2018-11-06 PROCEDURE — 97110 THERAPEUTIC EXERCISES: CPT | Performed by: PHYSICAL THERAPIST

## 2018-11-06 NOTE — PROGRESS NOTES
PT DAILY TREATMENT NOTE - [facility] 2-15 Patient Name: Kai Baca Date:2018 : 1940 [x]  Patient  Verified Payor: [place] / Plan: Ludin Liny / Product Type: Medicare / In time: 900 AM  Out time: 950 AM 
Total Treatment Time (min): 50 Total Timed Codes (min): 50 
1:1 Treatment Time ( only): 40 Visit #: 4 Treatment Area: Hip pain [M25.559] SUBJECTIVE Pain Level (0-10 scale): 3-4/10 Any medication changes, allergies to medications, adverse drug reactions, diagnosis change, or new procedure performed?: [x] No    [] Yes (see summary sheet for update) Subjective functional status/changes:   [] No changes reported Pt states that 2 days ago she went to the mall and walked for 2 hours without a rest break. Rates pain 7/10 while walking and she was limping. She reports good compliance with HEP; has not been icing OBJECTIVE Modality rationale: decrease pain to improve the patients ability to perform ADL's, walk, stand with dec'd symptoms Min Type Additional Details  
 
 [] Estim: []Att   []Unatt    []TENS instruct []IFC  []Premod   []NMES []Other:  []w/US   []w/ice   []w/heat Position: Location:  
 
 []  Traction: [] Cervical       []Lumbar 
                     [] Prone          []Supine []Intermittent   []Continuous Lbs: 
[] before manual 
[] after manual 
[]w/heat  
 []  Ultrasound: []Continuous   [] Pulsed  
                    at: []1MHz   []3MHz Location: 
W/cm2:  
 [] Paraffin Location:  
[]w/heat  
declined [x]  Ice     []  Heat 
[]  Ice massage Position: sidelying on R Location: L hip  
 []  Laser 
[]  Other: Position: Location:  
 
 []  Vasopneumatic Device Pressure:       [] lo [] med [] hi  
Temperature:   
 
[x] Skin assessment post-treatment:  [x]intact []redness- no adverse reaction 
  []redness  adverse reaction: 40 min Therapeutic Exercise:  [x] See flow sheet : reviewed HEP; added per flow sheet Rationale: increase ROM, increase strength, improve coordination, improve balance and increase proprioception to improve the patients ability to exercise, walk, stand with dec'd symptoms 10 min Manual Therapy:  Long axis hip distraction on L 
STM L glute max/med Rationale: decrease pain, increase tissue extensibility and decrease trigger points to improve the patients ability to perform daily chores with dec'd symptoms With 
 [] TE 
 [] TA 
 [] neuro 
 [] other: Patient Education: [x] Review HEP [] Progressed/Changed HEP based on:  
[] positioning   [] body mechanics   [] transfers   [] heat/ice application   
[] other:   
 
Other Objective/Functional Measures: Mod to severe TTP L glute max/med with light touch Pain Level (0-10 scale) post treatment: 4/10 ASSESSMENT/Changes in Function:  
Strongly encouraged pt to take rest breaks while out in community/walking for prolonged periods; also strongly encouraged ice for management of symptoms. Overall jevon therapy session well; challenged with progression of balance exercises Patient will continue to benefit from skilled PT services to modify and progress therapeutic interventions, address functional mobility deficits, address ROM deficits, address strength deficits, analyze and address soft tissue restrictions, analyze and cue movement patterns, analyze and modify body mechanics/ergonomics, assess and modify postural abnormalities and address imbalance/dizziness to attain remaining goals. []  See Plan of Care 
[]  See progress note/recertification 
[]  See Discharge Summary Progress towards goals / Updated goals: 
nt 
 
PLAN 
[]  Upgrade activities as tolerated     [x]  Continue plan of care 
[]  Update interventions per flow sheet      
[]  Discharge due to:_ 
[]  Other:_   
 
Luma Olivares, PT 11/6/2018  9:25 AM

## 2018-11-08 ENCOUNTER — HOSPITAL ENCOUNTER (OUTPATIENT)
Dept: PHYSICAL THERAPY | Age: 78
Discharge: HOME | End: 2018-11-08
Payer: MEDICARE

## 2018-11-08 PROCEDURE — 97112 NEUROMUSCULAR REEDUCATION: CPT | Performed by: PHYSICAL THERAPIST

## 2018-11-08 PROCEDURE — 97110 THERAPEUTIC EXERCISES: CPT | Performed by: PHYSICAL THERAPIST

## 2018-11-08 NOTE — PROGRESS NOTES
PT DAILY TREATMENT NOTE - [facility] 2-15 Patient Name: Indy Basurto Date:2018 : 1940 [x]  Patient  Verified Payor: [place] [employer] / Plan: Ludin Payan / Product Type: Medicare / In time: 935 AM  Out time: 1040 AM 
Total Treatment Time (min): 65 Total Timed Codes (min): 55 
1:1 Treatment Time ( [address] only): 25 Visit #: 2 Treatment Area: Pain in left hip [M25.552] SUBJECTIVE Pain Level (0-10 scale): 3-4/10 Any medication changes, allergies to medications, adverse drug reactions, diagnosis change, or new procedure performed?: [x] No    [] Yes (see summary sheet for update) Subjective functional status/changes:   [] No changes reported Pt states reports no pain today; she was sore after last visit OBJECTIVE Modality rationale: decrease pain to improve the patients ability to perform ADL's, walk, stand with dec'd symptoms Min Type Additional Details  
 
 [] Estim: []Att   []Unatt    []TENS instruct []IFC  []Premod   []NMES []Other:  []w/US   []w/ice   []w/heat Position: Location:  
 
 []  Traction: [] Cervical       []Lumbar 
                     [] Prone          []Supine []Intermittent   []Continuous Lbs: 
[] before manual 
[] after manual 
[]w/heat  
 []  Ultrasound: []Continuous   [] Pulsed  
                    at: []1MHz   []3MHz Location: 
W/cm2:  
 [] Paraffin Location:  
[]w/heat  
declined [x]  Ice     []  Heat 
[]  Ice massage Position: sidelying on R Location: L hip  
 []  Laser 
[]  Other: Position: Location:  
 
 []  Vasopneumatic Device Pressure:       [] lo [] med [] hi  
Temperature:   
 
[x] Skin assessment post-treatment:  [x]intact []redness- no adverse reaction 
  []redness  adverse reaction:  
 
40 min Therapeutic Exercise:  [x] See flow sheet : reviewed HEP; added per flow sheet Rationale: increase ROM, increase strength, improve coordination, improve balance and increase proprioception to improve the patients ability to exercise, walk, stand with dec'd symptoms 15 min Neuromuscular Re-education:  [x]  See flow sheet : sidestepping over 1/2 foam rolls, retro walking, tandem walking, cone taps without UE support, SLS, fwd walking with cervical rotation, tandem stance Rationale: increase ROM, increase strength, improve coordination, improve balance and increase proprioception  to improve the patients ability to improve safety during daily activities Held min Manual Therapy:  Long axis hip distraction on L 
STM L glute max/med Rationale: decrease pain, increase tissue extensibility and decrease trigger points to improve the patients ability to perform daily chores with dec'd symptoms With 
 [] TE 
 [] TA 
 [] neuro 
 [] other: Patient Education: [x] Review HEP [] Progressed/Changed HEP based on:  
[] positioning   [] body mechanics   [] transfers   [] heat/ice application   
[] other:   
 
Other Objective/Functional Measures:  
n/a Pain Level (0-10 scale) post treatment: 0/10 ASSESSMENT/Changes in Function:  
Challenged with balance exercises today; overall jevon treatment session well Patient will continue to benefit from skilled PT services to modify and progress therapeutic interventions, address functional mobility deficits, address ROM deficits, address strength deficits, analyze and address soft tissue restrictions, analyze and cue movement patterns, analyze and modify body mechanics/ergonomics, assess and modify postural abnormalities and address imbalance/dizziness to attain remaining goals. []  See Plan of Care 
[]  See progress note/recertification 
[]  See Discharge Summary Progress towards goals / Updated goals: 
nt 
 
PLAN 
[]  Upgrade activities as tolerated     [x]  Continue plan of care 
[]  Update interventions per flow sheet      
[]  Discharge due to:_ 
[]  Other:_   
 
 Alek Jeffers, PT 11/8/2018  9:50 AM

## 2018-11-13 ENCOUNTER — HOSPITAL ENCOUNTER (OUTPATIENT)
Dept: PHYSICAL THERAPY | Age: 78
End: 2018-11-13
Payer: MEDICARE

## 2018-11-15 ENCOUNTER — HOSPITAL ENCOUNTER (OUTPATIENT)
Dept: PHYSICAL THERAPY | Age: 78
Discharge: HOME | End: 2018-11-15
Payer: MEDICARE

## 2018-11-15 PROCEDURE — 97140 MANUAL THERAPY 1/> REGIONS: CPT | Performed by: PHYSICAL THERAPIST

## 2018-11-15 PROCEDURE — 97110 THERAPEUTIC EXERCISES: CPT | Performed by: PHYSICAL THERAPIST

## 2018-11-15 NOTE — PROGRESS NOTES
PT DAILY TREATMENT NOTE - [facility] 2-15 Patient Name: David Stock Date:11/15/2018 : 1940 [x]  Patient  Verified Payor: [place] MEDICARE / Plan: Ludin Payan / Product Type: Medicare / In time: 10:00 AM  Out time: 1045 AM 
Total Treatment Time (min): 45 Total Timed Codes (min): 45 
1:1 Treatment Time ([facility] only): 40 Visit #: 6 Treatment Area: Pain in left hip [M25.552] SUBJECTIVE Pain Level (0-10 scale): 0/10 Any medication changes, allergies to medications, adverse drug reactions, diagnosis change, or new procedure performed?: [x] No    [] Yes (see summary sheet for update) Subjective functional status/changes:   [] No changes reported Pt states she is having some L hip pain \"when I touch it\" OBJECTIVE Modality rationale: decrease pain to improve the patients ability to perform ADL's, walk, stand with dec'd symptoms Min Type Additional Details  
 
 [] Estim: []Att   []Unatt    []TENS instruct []IFC  []Premod   []NMES []Other:  []w/US   []w/ice   []w/heat Position: Location:  
 
 []  Traction: [] Cervical       []Lumbar 
                     [] Prone          []Supine []Intermittent   []Continuous Lbs: 
[] before manual 
[] after manual 
[]w/heat  
 []  Ultrasound: []Continuous   [] Pulsed  
                    at: []1MHz   []3MHz Location: 
W/cm2:  
 [] Paraffin Location:  
[]w/heat  
declined [x]  Ice     []  Heat 
[]  Ice massage Position: sidelying on R Location: L hip  
 []  Laser 
[]  Other: Position: Location:  
 
 []  Vasopneumatic Device Pressure:       [] lo [] med [] hi  
Temperature:   
 
[x] Skin assessment post-treatment:  [x]intact []redness- no adverse reaction 
  []redness  adverse reaction:  
 
35 min Therapeutic Exercise:  [x] See flow sheet : reviewed HEP; added per flow sheet Rationale: increase ROM, increase strength, improve coordination, improve balance and increase proprioception to improve the patients ability to exercise, walk, stand with dec'd symptoms Not completed today min Neuromuscular Re-education:  [x]  See flow sheet : sidestepping over 1/2 foam rolls, retro walking, tandem walking, cone taps without UE support, SLS, fwd walking with cervical rotation, tandem stance Rationale: increase ROM, increase strength, improve coordination, improve balance and increase proprioception  to improve the patients ability to improve safety during daily activities 10 min Manual Therapy: STM L glute max/med Rationale: decrease pain, increase tissue extensibility and decrease trigger points to improve the patients ability to perform daily chores with dec'd symptoms With 
 [] TE 
 [] TA 
 [] neuro 
 [] other: Patient Education: [x] Review HEP [] Progressed/Changed HEP based on:  
[] positioning   [] body mechanics   [] transfers   [] heat/ice application   
[] other:   
 
Other Objective/Functional Measures:  
n/a Pain Level (0-10 scale) post treatment: 0/10 ASSESSMENT/Changes in Function:  
Pt told therapist at 10:45 that she needed to leave immediately to make another appt at 11 that she had forgotten about; therefore not able to complete balance exercises today. Patient will continue to benefit from skilled PT services to modify and progress therapeutic interventions, address functional mobility deficits, address ROM deficits, address strength deficits, analyze and address soft tissue restrictions, analyze and cue movement patterns, analyze and modify body mechanics/ergonomics, assess and modify postural abnormalities and address imbalance/dizziness to attain remaining goals. []  See Plan of Care 
[]  See progress note/recertification 
[]  See Discharge Summary Progress towards goals / Updated goals: 
nt 
 
PLAN 
[]  Upgrade activities as tolerated     [x]  Continue plan of care []  Update interventions per flow sheet      
[]  Discharge due to:_ 
[]  Other:_   
 
Sarah Rodriguez PT 11/15/2018  10:46 AM

## 2018-11-27 ENCOUNTER — HOSPITAL ENCOUNTER (OUTPATIENT)
Dept: PHYSICAL THERAPY | Age: 78
Discharge: HOME | End: 2018-11-27
Payer: MEDICARE

## 2018-11-27 PROCEDURE — 97112 NEUROMUSCULAR REEDUCATION: CPT | Performed by: PHYSICAL THERAPIST

## 2018-11-27 PROCEDURE — 97140 MANUAL THERAPY 1/> REGIONS: CPT | Performed by: PHYSICAL THERAPIST

## 2018-11-27 PROCEDURE — 97110 THERAPEUTIC EXERCISES: CPT | Performed by: PHYSICAL THERAPIST

## 2018-11-27 NOTE — PROGRESS NOTES
PT DAILY TREATMENT NOTE - [facility] 2-15 Patient Name: Iris Connelly Date:2018 : 1940 [x]  Patient  Verified Payor: [place] MED[place]ARE / Plan: Ludin Payan / Product Type: Medicare / In time: 8:55 AM  Out time: 955 AM 
Total Treatment Time (min): 60 Total Timed Codes (min): 60 
1:1 Treatment Time ( only): 40 Visit #: 9 Treatment Area: Left hip pain [M25.552] SUBJECTIVE Pain Level (0-10 scale): 0/10 Any medication changes, allergies to medications, adverse drug reactions, diagnosis change, or new procedure performed?: [x] No    [] Yes (see summary sheet for update) Subjective functional status/changes:   [] No changes reported Pt states that her L hip has been feeling better; still feels that her balance is \"off\" OBJECTIVE Modality rationale: decrease pain to improve the patients ability to perform ADL's, walk, stand with dec'd symptoms Type Additional Details  
[] Estim: []Att   []Unatt    []TENS instruct []IFC  []Premod   []NMES []Other:  []w/US   []w/ice   []w/heat Position: Location:  
[]  Traction: [] Cervical       []Lumbar 
                     [] Prone          []Supine []Intermittent   []Continuous Lbs: 
[] before manual 
[] after manual 
[]w/heat  
[]  Ultrasound: []Continuous   [] Pulsed  
                    at: []1MHz   []3MHz Location: 
W/cm2:  
[] Paraffin Location:  
[]w/heat  
[x]  Ice     []  Heat 
[]  Ice massage Position: sidelying on R Location: L hip  
[]  Laser 
[]  Other: Position: Location:  
[]  Vasopneumatic Device Pressure:       [] lo [] med [] hi  
Temperature:   
 
[x] Skin assessment post-treatment:  [x]intact []redness- no adverse reaction 
  []redness  adverse reaction:  
 
35 min Therapeutic Exercise:  [x] See flow sheet : reviewed HEP; added per flow sheet Rationale: increase ROM, increase strength, improve coordination, improve balance and increase proprioception to improve the patients ability to exercise, walk, stand with dec'd symptoms 15 min Neuromuscular Re-education:  [x]  See flow sheet : sidestepping over 1/2 foam rolls, retro walking, tandem walking, cone taps without UE support, SLS, tandem stance Rationale: increase ROM, increase strength, improve coordination, improve balance and increase proprioception  to improve the patients ability to improve safety during daily activities 10 min Manual Therapy: STM L glute max/med Rationale: decrease pain, increase tissue extensibility and decrease trigger points to improve the patients ability to perform daily chores with dec'd symptoms With 
 [] TE 
 [] TA 
 [] neuro 
 [] other: Patient Education: [x] Review HEP [] Progressed/Changed HEP based on:  
[] positioning   [] body mechanics   [] transfers   [] heat/ice application   
[] other:   
 
Other Objective/Functional Measures:  
n/a Pain Level (0-10 scale) post treatment: 0/10 ASSESSMENT/Changes in Function:  
Progressing well with balance and strength training. Plan for possible D/C next visit. Patient will continue to benefit from skilled PT services to modify and progress therapeutic interventions, address functional mobility deficits, address ROM deficits, address strength deficits, analyze and address soft tissue restrictions, analyze and cue movement patterns, analyze and modify body mechanics/ergonomics, assess and modify postural abnormalities and address imbalance/dizziness to attain remaining goals. []  See Plan of Care 
[]  See progress note/recertification 
[]  See Discharge Summary Progress towards goals / Updated goals: 
Short Term Goals: To be accomplished in 2-3 weeks: 
1) Pt will be independent in initial HEP 
2) Pt will report >/=25% decrease in exacerbation of symptoms 
  
Long Term Goals: To be accomplished in 4-6 weeks: 1) Pt will improve bilat hip abd strength to >/=4/5 in order to aid in exercise routine 2) Pt will return to mod exercise routine at gym without exacerbation of sx 3) Pt will report >/=75% decrease in exacerbation of symptoms 4) Pt will improve FOTO score to >/=61/100 in order to demonstrate improvement in functional mobility 5) Pt will perform SLS for >/=10 sec each LE in order to demonstrate improvement in balance PLAN 
[]  Upgrade activities as tolerated     [x]  Continue plan of care 
[]  Update interventions per flow sheet      
[]  Discharge due to:_ 
[]  Other:_  D/C next visit?  
 
Chantal Mcleod, PT 11/27/2018  9:04 AM

## 2018-11-30 ENCOUNTER — HOSPITAL ENCOUNTER (OUTPATIENT)
Dept: PHYSICAL THERAPY | Age: 78
Discharge: HOME | End: 2018-11-30
Payer: MEDICARE

## 2018-11-30 ENCOUNTER — APPOINTMENT (OUTPATIENT)
Dept: PHYSICAL THERAPY | Age: 78
End: 2018-11-30
Payer: MEDICARE

## 2018-11-30 PROCEDURE — 97140 MANUAL THERAPY 1/> REGIONS: CPT | Performed by: PHYSICAL THERAPIST

## 2018-11-30 PROCEDURE — G8980 MOBILITY D/C STATUS: HCPCS | Performed by: PHYSICAL THERAPIST

## 2018-11-30 PROCEDURE — 97112 NEUROMUSCULAR REEDUCATION: CPT | Performed by: PHYSICAL THERAPIST

## 2018-11-30 PROCEDURE — G8979 MOBILITY GOAL STATUS: HCPCS | Performed by: PHYSICAL THERAPIST

## 2018-11-30 PROCEDURE — 97110 THERAPEUTIC EXERCISES: CPT | Performed by: PHYSICAL THERAPIST

## 2018-11-30 NOTE — PROGRESS NOTES
PT DAILY TREATMENT/Progress NOTE - [facility] 2-15 Patient Name: Garfield Beck Date:2018 : 1940 [x]  Patient  Verified Payor: [place] MEDICARE / Plan: Ludin Payan / Product Type: Medicare / In time: 900 AM  Out time: 10:00 AM 
Total Treatment Time (min): 60 Total Timed Codes (min): 60 
1:1 Treatment Time ( only): 45 Visit #: 5 Treatment Area: Left hip pain [M25.552] SUBJECTIVE Pain Level (0-10 scale): 0/10 Any medication changes, allergies to medications, adverse drug reactions, diagnosis change, or new procedure performed?: [x] No    [] Yes (see summary sheet for update) Subjective functional status/changes:   [] No changes reported Pt reports 80% improvement in hip pain; she states she feels her balance is a little better, sometimes a little \"off\" She will be traveling for approx 1 month (late Dec through early Feb) to [facility] OBJECTIVE Modality rationale: decrease pain to improve the patients ability to perform ADL's, walk, stand with dec'd symptoms Type Additional Details  
[] Estim: []Att   []Unatt    []TENS instruct []IFC  []Premod   []NMES []Other:  []w/US   []w/ice   []w/heat Position: Location:  
[]  Traction: [] Cervical       []Lumbar 
                     [] Prone          []Supine []Intermittent   []Continuous Lbs: 
[] before manual 
[] after manual 
[]w/heat  
[]  Ultrasound: []Continuous   [] Pulsed  
                    at: []1MHz   []3MHz Location: 
W/cm2:  
[] Paraffin Location:  
[]w/heat  
[]  Ice    []  Heat 
[]  Ice massage Position: sidelying on R Location: L hip  
[]  Laser 
[]  Other: Position: Location:  
[]  Vasopneumatic Device Pressure:       [] lo [] med [] hi  
Temperature:   
 
[x] Skin assessment post-treatment:  [x]intact []redness- no adverse reaction 
  []redness  adverse reaction: 40 min Therapeutic Exercise:  [x] See flow sheet : reviewed HEP; reassessment performed Rationale: increase ROM, increase strength, improve coordination, improve balance and increase proprioception to improve the patients ability to exercise, walk, stand with dec'd symptoms 10 min Neuromuscular Re-education:  [x]  See flow sheet : , retro walking, tandem walking, cone taps without UE support, SLS, tandem stance Rationale: increase ROM, increase strength, improve coordination, improve balance and increase proprioception  to improve the patients ability to improve safety during daily activities 10 min Manual Therapy: STM L glute max/med Rationale: decrease pain, increase tissue extensibility and decrease trigger points to improve the patients ability to perform daily chores with dec'd symptoms With 
 [] TE 
 [] TA 
 [] neuro 
 [] other: Patient Education: [x] Review HEP [] Progressed/Changed HEP based on:  
[] positioning   [] body mechanics   [] transfers   [] heat/ice application   
[] other:   
 
Other Objective/Functional Measures:  
Strength (1-5)         
     
  Right Left Hip flexion 4 4 Hip abd 4- 4-  
Knee flex 4+ 4+ Knee ext 4+ 4+ Pt able to perform 100% of supine bridge Pt able to perform 100% of supine SLR Functional Biomechanical Screen SLS: 
R) >10 sec. Min to no postural sway L)  >10 sec. Min to no postural sway  
  
Tandem stance: 
>15 sec ea direct. Min to no postural sway 
  
Mod CTSIB Condition 1) 30 sec. Min to no postural sway Condition 2) 30 sec. Min to no postural sway Condition 3) 30 sec. Min to mod postural sway Condition 4) 21 sec. Eyes open, arms uncrossed Palpation Mod TTP L GT, L glute max/med 
  
Outcome Measure: FOTO= 51/100 (50 at eval) Pain Level (0-10 scale) post treatment: 0/10 ASSESSMENT/Changes in Function: Pt has completed 8 skilled PT visits. She has met 2/2 STG's and 2/5 LTG's. Pt reports 80% improvement in hip symptoms, continues to report feeling that her balance is a little \"off\". She demonstrates dec'd pain levels, improvement in LE strength, balance, and functional activities since beginning PT. Reviewed HEP today and encouraged pt to continue with exercises. Pt ready for D/C from PT at this time. []  See Plan of Care 
[]  See progress note/recertification 
[x]  See Discharge Summary G-Codes (GP) Mobility  Goal  CJ= 20-39%  D/C  CK= 40-59% Progress towards goals / Updated goals: 
Short Term Goals: To be accomplished in 2-3 weeks: 
1) Pt will be independent in initial HEP MET 2) Pt will report >/=25% decrease in exacerbation of symptoms MET 
  
Long Term Goals: To be accomplished in 4-6 weeks: 
1) Pt will improve bilat hip abd strength to >/=4/5 in order to aid in exercise routine not met 2) Pt will return to mod exercise routine at gym without exacerbation of sx not met (pt not returned to gym) 3) Pt will report >/=75% decrease in exacerbation of symptoms MET 4) Pt will improve FOTO score to >/=61/100 in order to demonstrate improvement in functional mobility not met 5) Pt will perform SLS for >/=10 sec each LE in order to demonstrate improvement in balance MET PLAN 
D/C to HEP Dwayne Hawkins, PT 11/30/2018  9:16 AM

## 2018-12-04 NOTE — ANCILLARY DISCHARGE INSTRUCTIONS
[facility] [address] [place], [address] [facility] Phone: [phone]  Fax: [phone] Discharge Summary  2-15 Patient name: Garry Edge  : 1940  Provider#:[id number] Referral source: Reji Bunn MD     
Medical/Treatment Diagnosis: Left hip pain [M25.552] Prior Hospitalization: see medical history Comorbidities: see evaluation Prior Level of Function:see evaluation Medications: Verified on Patient Summary List 
 
Start of Care: 10/25/18      Onset Date:see evaluation Visits from Start of Care: 8     Missed Visits: see CC Reporting Period : 10/25/18 to 18 Summary of care:  
Strength (1-5)         
         
  Right Left Hip flexion 4 4 Hip abd 4- 4-  
Knee flex 4+ 4+ Knee ext 4+ 4+    
Pt able to perform 100% of supine bridge Pt able to perform 100% of supine SLR 
  
Functional Biomechanical Screen SLS: 
R) >10 sec. Min to no postural sway L)  >10 sec. Min to no postural sway  
  
Tandem stance: 
>15 sec ea direct. Min to no postural sway 
  
Mod CTSIB Condition 1) 30 sec. Min to no postural sway Condition 2) 30 sec. Min to no postural sway Condition 3) 30 sec. Min to mod postural sway Condition 4) 21 sec. Eyes open, arms uncrossed 
  
Palpation Mod TTP L GT, L glute max/med 
  
Outcome Measure: FOTO= 51/100 (50 at eval)  
  
Progress towards goals Short Term Goals: To be accomplished in 2-3 weeks: 
1) Pt will be independent in initial HEP MET 2) Pt will report >/=25% decrease in exacerbation of symptoms MET 
  
Long Term Goals: To be accomplished in 4-6 weeks: 
1) Pt will improve bilat hip abd strength to >/=4/5 in order to aid in exercise routine not met 2) Pt will return to mod exercise routine at gym without exacerbation of sx not met (pt not returned to gym) 3) Pt will report >/=75% decrease in exacerbation of symptoms MET 4) Pt will improve FOTO score to >/=61/100 in order to demonstrate improvement in functional mobility not met 5) Pt will perform SLS for >/=10 sec each LE in order to demonstrate improvement in balance MET 
  
ASSESSMENT:  
Pt has completed 8 skilled PT visits. She has met 2/2 STG's and 2/5 LTG's. Pt reports 80% improvement in hip symptoms, continues to report feeling that her balance is a little \"off\". She demonstrates dec'd pain levels, improvement in LE strength, balance, and functional activities since beginning PT. Reviewed HEP today and encouraged pt to continue with exercises. Pt ready for D/C from PT at this time 
  
G-Codes (GP) Mobility  Goal  CJ= 20-39%  D/C  CK= 40-59% RECOMMENDATIONS: 
[x]Discontinue therapy: [x]Patient has reached or is progressing toward set goals []Patient is non-compliant or has abdicated 
    []Due to lack of appreciable progress towards set goals Deborah Neville, PT 12/4/2018 12:17 PM

## 2019-07-12 ENCOUNTER — HOSPITAL ENCOUNTER (OUTPATIENT)
Dept: PHYSICAL THERAPY | Age: 79
Discharge: HOME | End: 2019-07-12
Payer: MEDICARE

## 2019-07-12 PROCEDURE — 97110 THERAPEUTIC EXERCISES: CPT | Performed by: PHYSICAL THERAPIST

## 2019-07-12 PROCEDURE — 97161 PT EVAL LOW COMPLEX 20 MIN: CPT | Performed by: PHYSICAL THERAPIST

## 2019-07-12 NOTE — PROGRESS NOTES
[facility] Physical Therapy  [facility], [address] [place] [address]  Phone: [phone]  Fax: [phone]    Plan of Care/Statement of Necessity for Physical Therapy Services  2-15    Patient name: Mica Moreno  : 1940  Provider#: [id number]  Referral source: ABELINO Gonzalez      Medical/Treatment Diagnosis: Repeated falls [R29.6]     Prior Hospitalization: see medical history     Comorbidities: see evaluation  Prior Level of Function: see evaluation  Medications: Verified on Patient Summary List  Start of Care: 19      Onset Date: gradual   The Plan of Care and following information is based on the information from the initial evaluation. Assessment/ key information: Pt is an [de-identified]year old female presenting with balance/gait dysfunction. She will benefit from PT to address problem list below    Problem List: decrease strength, impaired gait/ balance, decrease ADL/ functional abilitiies, decrease activity tolerance and decrease flexibility/ joint mobility   Treatment Plan may include any combination of the following: Therapeutic exercise, Therapeutic activities, Neuromuscular re-education, Physical agent/modality, Gait/balance training, Manual therapy, Patient education, Self Care training, Functional mobility training, Home safety training and Stair training  Patient / Family readiness to learn indicated by: asking questions, trying to perform skills and interest  Persons(s) to be included in education: patient (P)  Barriers to Learning/Limitations: None  Patient Goal (s): see evaluation  Patient Self Reported Health Status: good  Rehabilitation Potential: good    Long Term Goals:  To be accomplished in 4-6 weeks:  1) Pt will be independent in final HEP  2) Pt will perform SLS for >/=10 sec without hip drop bilat in order to decrease fall risk  3) Pt will perform condition 4 of MCTSIB for >/=12 sec in order to demonstrate improvement in balance  4) Pt will demonstrate 5/5 bilat hip flexion strength in order to aid in exercise routine  5) Pt will report going to [facility] >/=1x/week and knowledge of appropriate exercise routine    Frequency / Duration: Patient to be seen 1-2 times per week for 6-8 weeks. Patient/ Caregiver education and instruction: self care, activity modification and exercises    [x]  Plan of care has been reviewed with PTA        Certification Period: 7/12/19- 10/2/19  Darnell Simms, PT 7/12/2019    ________________________________________________________________________    I certify that the above Therapy Services are being furnished while the patient is under my care. I agree with the treatment plan and certify that this therapy is necessary.     [de-identified] Signature:____________________  Date:____________Time: _________

## 2019-07-12 NOTE — PROGRESS NOTES
PT INITIAL EVALUATION NOTE 2-15    [facility]  [facility], [address]  Phone: [phone]  Fax: [phone]    Patient Name: Fernando Williamson  Date:2019  : 1940  [x]  Patient  Verified  Payor: Tevin Terry / Plan: [place] MEDICARE PART [place] & B / Product Type: Medicare /     In time: 1055 A  Out time: 11:20 A  Total Treatment Time (min): 25  Total Timed Codes (min): 10  1:1 Treatment Time (min): 25  Visit #: 1     Treatment Area: Repeated falls [R29.6]    SUBJECTIVE  Any medication changes, allergies to medications, adverse drug reactions, diagnosis change, or new procedure performed?: [] No    [x] Yes (see summary sheet for update)    Current symptoms/chief complaint and date of onset/injury:   Pt states that she has had several falls recently (in the past few months) from tripping. She feels very off-balance. She has hearing aids and feels that her ears throw her off balance sometimes. She lives with  at home. Has been seen in this PT clinic for balance in the past. She has a SPC at home, however does not use it. She belongs to the [facility], however does not go. Aggravated by:  n/a  Eased by: n/a    Location of symptoms: balance    Pain Level (0-10 scale): 0    Diagnostic Tests: n/a    PMHX: Significant for diabetes, hearing impaired, hip OA     Social/Recreation/Work:2 grandchildren- twins 3years old. Enjoys playing with her grandchildren, gardening, bridge. She is from the [facility]. Prior level of function: gradual progression of worsening balance    Patient goal(s): \"to have better balance\"    OBJECTIVE:    Gait:   Pt ambulates independently, no AD.  Occasional stepping strategy to regain balance while forward walking     Strength (1-5)           Right Left Comments   Hip flexion 4 4 -   Knee ext 5 5 -   Knee flex 5 5 -   Ankle DF 5 5 -     Tandem Balance:  R in front: 8 sec  L in front: 15 sec    SLS   R: 10 sec  L: 6 sec    Modified CTSIB:  Condition 1) 30 sec. Condition 2) 30 sec. Mod postural sway, req'd close supervision  Condition 3) 30 sec. Min postural sway  Condition 4) 9 sec.  Mod to max postural sway, req'd close supervision    5xSTS: (UE's crossed)  1) 20 sec  2) 19 sec    TU) 15 sec  2) 15 sec    FOTO: next visit      15 min Therapeutic Exercise:  [x] See flow sheet : instructed in HEP   Rationale: increase ROM, increase strength, improve coordination, improve balance and increase proprioception to improve the patients ability to decrease fall risk            With   [x] TE   [] TA   [] neuro   [] other: Patient Education: [x] Review HEP    [] Progressed/Changed HEP based on:   [] positioning   [] body mechanics   [] transfers   [] heat/ice application    [x] other: importance of performing HEP in order to maximize benefit of PT; encouraged wearing tennis shoes for safety, also when she comes to PT appointments; advised taking up throw rugs due to fall risk  Pt would benefit from use of SPC, etd while in community, however pt unresponsive to suggestion stating that she does not want to use one     Pain Level (0-10 scale) post treatment: 0      ASSESSMENT:  See Assessment     [x]  See Plan of [address], PT DPT 2019  11:13 AM

## 2019-07-16 ENCOUNTER — HOSPITAL ENCOUNTER (OUTPATIENT)
Dept: PHYSICAL THERAPY | Age: 79
Discharge: HOME | End: 2019-07-16
Payer: MEDICARE

## 2019-07-16 PROCEDURE — 97110 THERAPEUTIC EXERCISES: CPT | Performed by: PHYSICAL THERAPIST

## 2019-07-16 PROCEDURE — 97112 NEUROMUSCULAR REEDUCATION: CPT | Performed by: PHYSICAL THERAPIST

## 2019-07-16 NOTE — PROGRESS NOTES
PT DAILY TREATMENT NOTE - [facility] 2-15    Patient Name: Kelvin Montague  Date:2019  : 1940  [x]  Patient  Verified  Payor: Abhilash Morrow / Plan: [place] MEDICARE PART A & B / Product Type: Medicare /    In time:8:00 A  Out time:845 AM  Total Treatment Time (min): 45  Total Timed Codes (min): 45  1:1 Treatment Time ( [address] only): 39   Visit #: 2    Treatment Area: Repeated falls [R29.6]    SUBJECTIVE  Pain Level (0-10 scale): 0   Any medication changes, allergies to medications, adverse drug reactions, diagnosis change, or new procedure performed?: [x] No    [] Yes (see summary sheet for update)  Subjective functional status/changes:   [] No changes reported  She reports leg soreness after first visit.  Good compliance with HEP  She is having some pain in R hip today- history of arthritis    OBJECTIVE    Modality rationale: decrease pain to improve the patients ability to walk, balance, perform ADLs with dec'd symptoms   Min Type Additional Details       [] Estim: []Att   []Unatt    []TENS instruct                  []IFC  []Premod   []NMES                     []Other:  []w/US   []w/ice   []w/heat  Position:  Location:       []  Traction: [] Cervical       []Lumbar                       [] Prone          []Supine                       []Intermittent   []Continuous Lbs:  [] before manual  [] after manual  []w/heat    []  Ultrasound: []Continuous   [] Pulsed                       at: []1MHz   []3MHz Location:  W/cm2:    [] Paraffin         Location:   []w/heat    []  Ice     []  Heat  []  Ice massage Position:  Location:    []  Laser  []  Other: Position:  Location:      []  Vasopneumatic Device Pressure:       [] lo [] med [] hi   Temperature:      [x] Skin assessment post-treatment:  [x]intact []redness- no adverse reaction    []redness  adverse reaction:     30 min Therapeutic Exercise:  [x] See flow sheet : Rec elliptical, standing marches/hip abd, SLR, bridges, piriformis stretch, TG squats   Rationale: increase ROM, increase strength, improve coordination, improve balance and increase proprioception to improve the patients ability to perform ADLs, daily chores with improved balance       15 min Neuromuscular Re-education:  [x]  See flow sheet : tandem stance on foam, SLS on foam, rockerboard to challenge balance, alt cone taps, lateral and fwd stepping between 1/2 FR   Rationale: increase strength, improve coordination, improve balance and increase proprioception  to improve the patients ability to perform ADL's, daily chores with improved balance              With   [] TE   [] TA   [] neuro   [] other: Patient Education: [x] Review HEP    [] Progressed/Changed HEP based on:   [] positioning   [] body mechanics   [] transfers   [] heat/ice application    [] other:      Other Objective/Functional Measures: n/a     Pain Level (0-10 scale) post treatment: 0 \"tired\"    ASSESSMENT/Changes in Function:   Challenged with alt cone taps-- req'd CGA wth gait belt. Overall jevon therapy session well    Patient will continue to benefit from skilled PT services to modify and progress therapeutic interventions, address functional mobility deficits, address strength deficits, analyze and cue movement patterns, analyze and modify body mechanics/ergonomics, assess and modify postural abnormalities and address imbalance/dizziness to attain remaining goals.      []  See Plan of Care  []  See progress note/recertification  []  See Discharge Summary         Progress towards goals / Updated goals:  nt    PLAN  []  Upgrade activities as tolerated     [x]  Continue plan of care  []  Update interventions per flow sheet       []  Discharge due to:_  []  Other:_      Bucky Bull, PT 7/16/2019

## 2019-07-18 ENCOUNTER — HOSPITAL ENCOUNTER (OUTPATIENT)
Dept: PHYSICAL THERAPY | Age: 79
Discharge: HOME | End: 2019-07-18
Payer: MEDICARE

## 2019-07-18 PROCEDURE — 97112 NEUROMUSCULAR REEDUCATION: CPT | Performed by: PHYSICAL THERAPIST

## 2019-07-18 PROCEDURE — 97110 THERAPEUTIC EXERCISES: CPT | Performed by: PHYSICAL THERAPIST

## 2019-07-18 NOTE — PROGRESS NOTES
PT DAILY TREATMENT NOTE - [facility] 2-15    Patient Name: Mica Moreno  Date:2019  : 1940  [x]  Patient  Verified  Payor: Manju Mejia / Plan: [place] MEDICARE PART A & B / Product Type: Medicare /    In time: 1100 A  Out time: 1150 AM  Total Treatment Time (min): 50  Total Timed Codes (min): 50  1:1 Treatment Time ([facility] only): 40   Visit #: 3    Treatment Area: Repeated falls [R29.6]    SUBJECTIVE  Pain Level (0-10 scale): 0   Any medication changes, allergies to medications, adverse drug reactions, diagnosis change, or new procedure performed?: [x] No    [] Yes (see summary sheet for update)  Subjective functional status/changes:   [] No changes reported  She reports some soreness after last PT visit.  \"feel good\" today    OBJECTIVE    Modality rationale: decrease pain to improve the patients ability to walk, balance, perform ADLs with dec'd symptoms   Min Type Additional Details       [] Estim: []Att   []Unatt    []TENS instruct                  []IFC  []Premod   []NMES                     []Other:  []w/US   []w/ice   []w/heat  Position:  Location:       []  Traction: [] Cervical       []Lumbar                       [] Prone          []Supine                       []Intermittent   []Continuous Lbs:  [] before manual  [] after manual  []w/heat    []  Ultrasound: []Continuous   [] Pulsed                       at: []1MHz   []3MHz Location:  W/cm2:    [] Paraffin         Location:   []w/heat    []  Ice     []  Heat  []  Ice massage Position:  Location:    []  Laser  []  Other: Position:  Location:      []  Vasopneumatic Device Pressure:       [] lo [] med [] hi   Temperature:      [x] Skin assessment post-treatment:  [x]intact []redness- no adverse reaction    []redness  adverse reaction:     35 min Therapeutic Exercise:  [x] See flow sheet : Rec elliptical, standing marches/hip abd, SLR, bridges, piriformis stretch, TG squats   Rationale: increase ROM, increase strength, improve coordination, improve balance and increase proprioception to improve the patients ability to perform ADLs, daily chores with improved balance       15 min Neuromuscular Re-education:  [x]  See flow sheet : tandem stance on foam, SLS on foam, rockerboard to challenge balance, alt cone taps, lateral and fwd stepping between 1/2 FR, retro walking, Bilat shoulder ext with TB while standing on foam   Rationale: increase strength, improve coordination, improve balance and increase proprioception  to improve the patients ability to perform ADL's, daily chores with improved balance              With   [] TE   [] TA   [] neuro   [] other: Patient Education: [x] Review HEP    [] Progressed/Changed HEP based on:   [] positioning   [] body mechanics   [] transfers   [] heat/ice application    [] other:      Other Objective/Functional Measures: n/a     Pain Level (0-10 scale) post treatment: 0     ASSESSMENT/Changes in Function:   Madeline therapy session well; progressing with balance/proprioception exercises as tolerated. Patient will continue to benefit from skilled PT services to modify and progress therapeutic interventions, address functional mobility deficits, address strength deficits, analyze and cue movement patterns, analyze and modify body mechanics/ergonomics, assess and modify postural abnormalities and address imbalance/dizziness to attain remaining goals.      []  See Plan of Care  []  See progress note/recertification  []  See Discharge Summary         Progress towards goals / Updated goals:  nt    PLAN  []  Upgrade activities as tolerated     [x]  Continue plan of care  []  Update interventions per flow sheet       []  Discharge due to:_  []  Other:_      Joya Dobbs, PT 7/18/2019

## 2019-07-22 ENCOUNTER — HOSPITAL ENCOUNTER (OUTPATIENT)
Dept: PHYSICAL THERAPY | Age: 79
Discharge: HOME | End: 2019-07-22
Payer: MEDICARE

## 2019-07-22 PROCEDURE — 97112 NEUROMUSCULAR REEDUCATION: CPT | Performed by: PHYSICAL THERAPIST

## 2019-07-22 PROCEDURE — 97110 THERAPEUTIC EXERCISES: CPT | Performed by: PHYSICAL THERAPIST

## 2019-07-22 NOTE — PROGRESS NOTES
PT DAILY TREATMENT NOTE - [facility] 2-15    Patient Name: Jazzmine Arce  Date:2019  : 1940  [x]  Patient  Verified  Payor: [facility] / Plan: [place] MEDICARE PART A & B / Product Type: Medicare /    In time: 400 P  Out time: 450 P  Total Treatment Time (min): 50  Total Timed Codes (min): 50  1:1 Treatment Time ([facility] only): 30   Visit #: 4    Treatment Area: Repeated falls [R29.6]    SUBJECTIVE  Pain Level (0-10 scale): 0   Any medication changes, allergies to medications, adverse drug reactions, diagnosis change, or new procedure performed?: [x] No    [] Yes (see summary sheet for update)  Subjective functional status/changes:   [] No changes reported  Pt states she feels more steady on her feet, that her legs feel stronger    OBJECTIVE    Modality rationale: decrease pain to improve the patients ability to walk, balance, perform ADLs with dec'd symptoms   Min Type Additional Details       [] Estim: []Att   []Unatt    []TENS instruct                  []IFC  []Premod   []NMES                     []Other:  []w/US   []w/ice   []w/heat  Position:  Location:       []  Traction: [] Cervical       []Lumbar                       [] Prone          []Supine                       []Intermittent   []Continuous Lbs:  [] before manual  [] after manual  []w/heat    []  Ultrasound: []Continuous   [] Pulsed                       at: []1MHz   []3MHz Location:  W/cm2:    [] Paraffin         Location:   []w/heat    []  Ice     []  Heat  []  Ice massage Position:  Location:    []  Laser  []  Other: Position:  Location:      []  Vasopneumatic Device Pressure:       [] lo [] med [] hi   Temperature:      [x] Skin assessment post-treatment:  [x]intact []redness- no adverse reaction    []redness  adverse reaction:     35 min Therapeutic Exercise:  - See flow sheet : Rec elliptical, standing marches/hip abd, SLR, bridges, piriformis stretch, TG squats, HS curl machine   Rationale: increase ROM, increase strength, improve coordination, improve balance and increase proprioception to improve the patients ability to perform ADLs, daily chores with improved balance       15 min Neuromuscular Re-education:  [x]  See flow sheet : tandem stance on foam, SLS on foam, rockerboard to challenge balance, alt cone taps, lateral and fwd stepping between 1/2 FR, retro walking, Bilat shoulder ext with TB while standing on foam, NBOS with EC, 4 corner stepping on blue mat   Rationale: increase strength, improve coordination, improve balance and increase proprioception  to improve the patients ability to perform ADL's, daily chores with improved balance              With   [] TE   [] TA   [] neuro   [] other: Patient Education: [x] Review HEP    [] Progressed/Changed HEP based on:   [] positioning   [] body mechanics   [] transfers   [] heat/ice application    [] other:      Other Objective/Functional Measures: n/a     Pain Level (0-10 scale) post treatment: 0     ASSESSMENT/Changes in Function:   Improvement with alt cone taps today. Madeline progression of balance exercises well today    Patient will continue to benefit from skilled PT services to modify and progress therapeutic interventions, address functional mobility deficits, address strength deficits, analyze and cue movement patterns, analyze and modify body mechanics/ergonomics, assess and modify postural abnormalities and address imbalance/dizziness to attain remaining goals.      []  See Plan of Care  []  See progress note/recertification  []  See Discharge Summary         Progress towards goals / Updated goals:  nt    PLAN  []  Upgrade activities as tolerated     [x]  Continue plan of care  []  Update interventions per flow sheet       []  Discharge due to:_  []  Other:_      Damaris Angelo, PT 7/22/2019

## 2019-07-26 ENCOUNTER — HOSPITAL ENCOUNTER (OUTPATIENT)
Dept: PHYSICAL THERAPY | Age: 79
Discharge: HOME | End: 2019-07-26
Payer: MEDICARE

## 2019-07-26 PROCEDURE — 97112 NEUROMUSCULAR REEDUCATION: CPT | Performed by: PHYSICAL THERAPIST

## 2019-07-26 PROCEDURE — 97110 THERAPEUTIC EXERCISES: CPT | Performed by: PHYSICAL THERAPIST

## 2019-07-26 NOTE — PROGRESS NOTES
PT DAILY TREATMENT NOTE - [facility] 2-15    Patient Name: Jame Allan  Date:2019  : 1940  [x]  Patient  Verified  Payor: Carmine Patrick / Plan: [place] MEDICARE PART A & B / Product Type: Medicare /    In time: 800 A  Out time: 845 A  Total Treatment Time (min): 45  Total Timed Codes (min): 45  1:1 Treatment Time ( only): 30   Visit #: 5    Treatment Area: Repeated falls [R29.6]    SUBJECTIVE  Pain Level (0-10 scale): 0   Any medication changes, allergies to medications, adverse drug reactions, diagnosis change, or new procedure performed?: [x] No    [] Yes (see summary sheet for update)  Subjective functional status/changes:   [] No changes reported  Pt doing well today; feels like her balance is better    OBJECTIVE    Modality rationale: decrease pain to improve the patients ability to walk, balance, perform ADLs with dec'd symptoms   Min Type Additional Details       [] Estim: []Att   []Unatt    []TENS instruct                  []IFC  []Premod   []NMES                     []Other:  []w/US   []w/ice   []w/heat  Position:  Location:       []  Traction: [] Cervical       []Lumbar                       [] Prone          []Supine                       []Intermittent   []Continuous Lbs:  [] before manual  [] after manual  []w/heat    []  Ultrasound: []Continuous   [] Pulsed                       at: []1MHz   []3MHz Location:  W/cm2:    [] Paraffin         Location:   []w/heat    []  Ice     []  Heat  []  Ice massage Position:  Location:    []  Laser  []  Other: Position:  Location:      []  Vasopneumatic Device Pressure:       [] lo [] med [] hi   Temperature:      [x] Skin assessment post-treatment:  [x]intact []redness- no adverse reaction    []redness  adverse reaction:     30 min Therapeutic Exercise:  - See flow sheet : Rec elliptical, standing marches/hip abd, SLR, bridges, piriformis stretch, TG squats, HS curl machine   Rationale: increase ROM, increase strength, improve coordination, improve balance and increase proprioception to improve the patients ability to perform ADLs, daily chores with improved balance       15 min Neuromuscular Re-education:  [x]  See flow sheet : tandem stance on foam, SLS on foam, rockerboard to challenge balance, alt cone taps, lateral and fwd stepping on long foam; retro walking, NBOS with EC, ball toss while standing on blue foam   Rationale: increase strength, improve coordination, improve balance and increase proprioception  to improve the patients ability to perform ADL's, daily chores with improved balance              With   [] TE   [] TA   [] neuro   [] other: Patient Education: [x] Review HEP    [] Progressed/Changed HEP based on:   [] positioning   [] body mechanics   [] transfers   [] heat/ice application    [] other:      Other Objective/Functional Measures: n/a     Pain Level (0-10 scale) post treatment: 0     ASSESSMENT/Changes in Function:   Progressing well with strengthening/proprioceptive training    Patient will continue to benefit from skilled PT services to modify and progress therapeutic interventions, address functional mobility deficits, address strength deficits, analyze and cue movement patterns, analyze and modify body mechanics/ergonomics, assess and modify postural abnormalities and address imbalance/dizziness to attain remaining goals.      []  See Plan of Care  []  See progress note/recertification  []  See Discharge Summary         Progress towards goals / Updated goals:  nt    PLAN  []  Upgrade activities as tolerated     [x]  Continue plan of care  []  Update interventions per flow sheet       []  Discharge due to:_  []  Other:_      Jestine Castleman, PT 7/26/2019

## 2019-07-30 ENCOUNTER — HOSPITAL ENCOUNTER (OUTPATIENT)
Dept: PHYSICAL THERAPY | Age: 79
Discharge: HOME | End: 2019-07-30
Payer: MEDICARE

## 2019-07-30 PROCEDURE — 97112 NEUROMUSCULAR REEDUCATION: CPT | Performed by: PHYSICAL THERAPIST

## 2019-07-30 PROCEDURE — 97110 THERAPEUTIC EXERCISES: CPT | Performed by: PHYSICAL THERAPIST

## 2019-07-30 PROCEDURE — 97140 MANUAL THERAPY 1/> REGIONS: CPT | Performed by: PHYSICAL THERAPIST

## 2019-07-30 NOTE — PROGRESS NOTES
PT DAILY TREATMENT NOTE - [facility] 2-15    Patient Name: Taran Lainez  Date:2019  : 1940  [x]  Patient  Verified  Payor: Bridger Caal / Plan: [place] / Product Type: Medicare /    In time: 800 A  Out time: 855 A  Total Treatment Time (min): 55  Total Timed Codes (min): 55  1:1 Treatment Time ([address] only): 55   Visit #: 6    Treatment Area: Repeated falls [R29.6]    SUBJECTIVE  Pain Level (0-10 scale): Number not reported. Any medication changes, allergies to medications, adverse drug reactions, diagnosis change, or new procedure performed?: [x] No    [] Yes (see summary sheet for update)  Subjective functional status/changes:   [] No changes reported  Pt states that her R hip is hurting today (pt has been to PT clinic in the past for R hip arthritis).  She has not been doing exercises at home for hip arthritis that she was given at previous PT episode    OBJECTIVE    Modality rationale: decrease pain to improve the patients ability to walk, balance, perform ADLs with dec'd symptoms   Min Type Additional Details       [] Estim: []Att   []Unatt    []TENS instruct                  []IFC  []Premod   []NMES                     []Other:  []w/US   []w/ice   []w/heat  Position:  Location:       []  Traction: [] Cervical       []Lumbar                       [] Prone          []Supine                       []Intermittent   []Continuous Lbs:  [] before manual  [] after manual  []w/heat    []  Ultrasound: []Continuous   [] Pulsed                       at: []1MHz   []3MHz Location:  W/cm2:    [] Paraffin         Location:   []w/heat    []  Ice     []  Heat  []  Ice massage Position:  Location:    []  Laser  []  Other: Position:  Location:      []  Vasopneumatic Device Pressure:       [] lo [] med [] hi   Temperature:      [x] Skin assessment post-treatment:  [x]intact []redness- no adverse reaction    []redness  adverse reaction:     37 min Therapeutic Exercise:  - See flow sheet : Rec elliptical, standing marches/hip abd, SLR, bridges, piriformis stretch, TG squats, HS curl machine   Rationale: increase ROM, increase strength, improve coordination, improve balance and increase proprioception to improve the patients ability to perform ADLs, daily chores with improved balance       10 min Neuromuscular Re-education:  [x]  See flow sheet : tandem stance on foam, SLS on foam, rockerboard to challenge balance,  lateral stepping on long foam; retro walking, NBOS with EC   Rationale: increase strength, improve coordination, improve balance and increase proprioception  to improve the patients ability to perform ADL's, daily chores with improved balance    8 min Manual Therapy:  STM R glute max/med/piriformis    Rationale: decrease pain  to improve the patients ability to walk, perform daily chores with dec'd symptoms      With   [] TE   [] TA   [] neuro   [] other: Patient Education: [x] Review HEP    [] Progressed/Changed HEP based on:   [] positioning   [] body mechanics   [] transfers   [] heat/ice application    [] other:      Other Objective/Functional Measures: n/a     Pain Level (0-10 scale) post treatment: 0     ASSESSMENT/Changes in Function:   Encouraged pt to schedule massage if manual techniques helpful; also reviewed appropriate stretches. Pt continues to be challenged with balance exercises. Patient will continue to benefit from skilled PT services to modify and progress therapeutic interventions, address functional mobility deficits, address strength deficits, analyze and cue movement patterns, analyze and modify body mechanics/ergonomics, assess and modify postural abnormalities and address imbalance/dizziness to attain remaining goals.      []  See Plan of Care  []  See progress note/recertification  []  See Discharge Summary         Progress towards goals / Updated goals:  nt    PLAN  []  Upgrade activities as tolerated     [x]  Continue plan of care  []  Update interventions per flow sheet []  Discharge due to:_  []  Other:_      Francy Morris, PT 7/30/2019

## 2019-08-02 ENCOUNTER — HOSPITAL ENCOUNTER (OUTPATIENT)
Dept: PHYSICAL THERAPY | Age: 79
Discharge: HOME | End: 2019-08-02
Payer: MEDICARE

## 2019-08-02 PROCEDURE — 97112 NEUROMUSCULAR REEDUCATION: CPT | Performed by: PHYSICAL THERAPIST

## 2019-08-02 PROCEDURE — 97110 THERAPEUTIC EXERCISES: CPT | Performed by: PHYSICAL THERAPIST

## 2019-08-02 PROCEDURE — 97140 MANUAL THERAPY 1/> REGIONS: CPT | Performed by: PHYSICAL THERAPIST

## 2019-08-02 NOTE — PROGRESS NOTES
PT DAILY TREATMENT NOTE - [facility] 2-15    Patient Name: Rosalinda Almanzar  Date:2019  : 1940  [x]  Patient  Verified  Payor: [place] MEDICARE / Plan: [place] MEDICARE PART A & B / Product Type: Medicare /    In time: 800 A  Out time: 8:55 A  Total Treatment Time (min): 55  Total Timed Codes (min): 55  1:1 Treatment Time ( [address] only): 55   Visit #: 7    Treatment Area: Repeated falls [R29.6]    SUBJECTIVE  Pain Level (0-10 scale): 6/10 R hip  Any medication changes, allergies to medications, adverse drug reactions, diagnosis change, or new procedure performed?: [x] No    [] Yes (see summary sheet for update)  Subjective functional status/changes:   [] No changes reported  Pt feels that her balance, LE strength is much better since beginning PT.  Inc'd R hip pain today    OBJECTIVE    Modality rationale: decrease pain to improve the patients ability to walk, balance, perform ADLs with dec'd symptoms   Min Type Additional Details       [] Estim: []Att   []Unatt    []TENS instruct                  []IFC  []Premod   []NMES                     []Other:  []w/US   []w/ice   []w/heat  Position:  Location:       []  Traction: [] Cervical       []Lumbar                       [] Prone          []Supine                       []Intermittent   []Continuous Lbs:  [] before manual  [] after manual  []w/heat    []  Ultrasound: []Continuous   [] Pulsed                       at: []1MHz   []3MHz Location:  W/cm2:    [] Paraffin         Location:   []w/heat    []  Ice     []  Heat  []  Ice massage Position:  Location:    []  Laser  []  Other: Position:  Location:      []  Vasopneumatic Device Pressure:       [] lo [] med [] hi   Temperature:      [x] Skin assessment post-treatment:  [x]intact []redness- no adverse reaction    []redness  adverse reaction:     32 min Therapeutic Exercise:  - See flow sheet : Rec elliptical, standing marches/hip abd, SLR, bridges, piriformis stretch, TG squats, HS curl machine   Rationale: increase ROM, increase strength, improve coordination, improve balance and increase proprioception to improve the patients ability to perform ADLs, daily chores with improved balance       15 min Neuromuscular Re-education:  [x]  See flow sheet : tandem stance on foam, SLS on foam, rockerboard to challenge balance,  lateral stepping on long foam; retro walking, NBOS with EC on foam, ball toss in SLS and mod tandem stance   Rationale: increase strength, improve coordination, improve balance and increase proprioception  to improve the patients ability to perform ADL's, daily chores with improved balance    8 min Manual Therapy:  STM R glute max/med/piriformis    Rationale: decrease pain  to improve the patients ability to walk, perform daily chores with dec'd symptoms      With   [] TE   [] TA   [] neuro   [] other: Patient Education: [x] Review HEP    [] Progressed/Changed HEP based on:   [] positioning   [] body mechanics   [] transfers   [] heat/ice application    [] other:      Other Objective/Functional Measures: n/a     Pain Level (0-10 scale) post treatment: 0     ASSESSMENT/Changes in Function:   Progressed balance/proprioceptive exercises today. Reviewed appropriate exercises for R hip     Patient will continue to benefit from skilled PT services to modify and progress therapeutic interventions, address functional mobility deficits, address strength deficits, analyze and cue movement patterns, analyze and modify body mechanics/ergonomics, assess and modify postural abnormalities and address imbalance/dizziness to attain remaining goals.      []  See Plan of Care  []  See progress note/recertification  []  See Discharge Summary         Progress towards goals / Updated goals:  nt    PLAN  []  Upgrade activities as tolerated     [x]  Continue plan of care  []  Update interventions per flow sheet       []  Discharge due to:_  []  Other:_      Lisseth Muniz, PT 8/2/2019

## 2019-08-06 ENCOUNTER — HOSPITAL ENCOUNTER (OUTPATIENT)
Dept: PHYSICAL THERAPY | Age: 79
Discharge: HOME | End: 2019-08-06
Payer: MEDICARE

## 2019-08-06 PROCEDURE — 97112 NEUROMUSCULAR REEDUCATION: CPT | Performed by: PHYSICAL THERAPIST

## 2019-08-06 PROCEDURE — 97110 THERAPEUTIC EXERCISES: CPT | Performed by: PHYSICAL THERAPIST

## 2019-08-06 NOTE — PROGRESS NOTES
PT DAILY TREATMENT NOTE - [facility] 2-15    Patient Name: Tim Pickett  Date:2019  : 1940  [x]  Patient  Verified  Payor: [place] MEDICARE / Plan: [place] MEDICARE PART A & B / Product Type: Medicare /    In time: 800 A  Out time: 8:50 A  Total Treatment Time (min): 50  Total Timed Codes (min): 50  1:1 Treatment Time ([facility] only): 40   Visit #: 8    Treatment Area: Repeated falls [R29.6]    SUBJECTIVE  Pain Level (0-10 scale): 0/10 \"better\" in regards to R hip pain  Any medication changes, allergies to medications, adverse drug reactions, diagnosis change, or new procedure performed?: [x] No    [] Yes (see summary sheet for update)  Subjective functional status/changes:   [] No changes reported  Pt reports that she feels better, stronger since being in PT    OBJECTIVE    Modality rationale: decrease pain to improve the patients ability to walk, balance, perform ADLs with dec'd symptoms   Min Type Additional Details       [] Estim: []Att   []Unatt    []TENS instruct                  []IFC  []Premod   []NMES                     []Other:  []w/US   []w/ice   []w/heat  Position:  Location:       []  Traction: [] Cervical       []Lumbar                       [] Prone          []Supine                       []Intermittent   []Continuous Lbs:  [] before manual  [] after manual  []w/heat    []  Ultrasound: []Continuous   [] Pulsed                       at: []1MHz   []3MHz Location:  W/cm2:    [] Paraffin         Location:   []w/heat    []  Ice     []  Heat  []  Ice massage Position:  Location:    []  Laser  []  Other: Position:  Location:      []  Vasopneumatic Device Pressure:       [] lo [] med [] hi   Temperature:      [x] Skin assessment post-treatment:  [x]intact []redness- no adverse reaction    []redness  adverse reaction:     35 min Therapeutic Exercise:  - See flow sheet : Rec elliptical, standing marches/hip abd, SLR, bridges, piriformis stretch, TG squats, HS curl machine   Rationale: increase ROM, increase strength, improve coordination, improve balance and increase proprioception to improve the patients ability to perform ADLs, daily chores with improved balance       15 min Neuromuscular Re-education:  [x]  See flow sheet : tandem stance on foam, SLS on foam, rockerboard to challenge balance,  lateral stepping on long foam; retro walking, NBOS with EC on foam, ball toss in SLS and mod tandem stance; step ups while holding 3# med ball for balance   Rationale: increase strength, improve coordination, improve balance and increase proprioception  to improve the patients ability to perform ADL's, daily chores with improved balance    0 min HELD : Manual Therapy:  STM R glute max/med/piriformis    Rationale: decrease pain  to improve the patients ability to walk, perform daily chores with dec'd symptoms      With   [] TE   [] TA   [] neuro   [] other: Patient Education: [x] Review HEP    [] Progressed/Changed HEP based on:   [] positioning   [] body mechanics   [] transfers   [] heat/ice application    [] other:      Other Objective/Functional Measures: n/a     Pain Level (0-10 scale) post treatment: 0     ASSESSMENT/Changes in Function:   Challenged with step ups while holding 3# med ball to challenge balance. Progressing well. Patient will continue to benefit from skilled PT services to modify and progress therapeutic interventions, address functional mobility deficits, address strength deficits, analyze and cue movement patterns, analyze and modify body mechanics/ergonomics, assess and modify postural abnormalities and address imbalance/dizziness to attain remaining goals.      []  See Plan of Care  []  See progress note/recertification  []  See Discharge Summary         Progress towards goals / Updated goals:  nt    PLAN  []  Upgrade activities as tolerated     [x]  Continue plan of care  []  Update interventions per flow sheet       []  Discharge due to:_  []  Other:_      Shahid Lund, PT 8/6/2019

## 2019-08-09 ENCOUNTER — HOSPITAL ENCOUNTER (OUTPATIENT)
Dept: PHYSICAL THERAPY | Age: 79
Discharge: HOME | End: 2019-08-09
Payer: MEDICARE

## 2019-08-09 PROCEDURE — 97140 MANUAL THERAPY 1/> REGIONS: CPT | Performed by: PHYSICAL THERAPIST

## 2019-08-09 PROCEDURE — 97110 THERAPEUTIC EXERCISES: CPT | Performed by: PHYSICAL THERAPIST

## 2019-08-09 PROCEDURE — 97112 NEUROMUSCULAR REEDUCATION: CPT | Performed by: PHYSICAL THERAPIST

## 2019-08-09 NOTE — PROGRESS NOTES
PT DAILY TREATMENT/Progress NOTE - [facility] 2-15    Patient Name: Taran Lainez  Date:2019  : 1940  [x]  Patient  Verified  Payor: [place] MEDICARE / Plan: [place] MEDICARE PART A & B / Product Type: Medicare /    In time: 800 A  Out time: 855 A  Total Treatment Time (min): 55  Total Timed Codes (min): 55  1:1 Treatment Time ( only): 55   Visit #: 9    Treatment Area: Repeated falls [R29.6]    SUBJECTIVE  Pain Level (0-10 scale): R hip pain, number not reported  Any medication changes, allergies to medications, adverse drug reactions, diagnosis change, or new procedure performed?: [x] No    [] Yes (see summary sheet for update)  Subjective functional status/changes:   [] No changes reported  Pt reports that she went walking yesterday with her daughter, is having inc'd R hip pain today. She overall feels that her balance and strength as improved.  She has not been going to the gym    OBJECTIVE    Modality rationale: decrease pain to improve the patients ability to walk, balance, perform ADLs with dec'd symptoms   Min Type Additional Details       [] Estim: []Att   []Unatt    []TENS instruct                  []IFC  []Premod   []NMES                     []Other:  []w/US   []w/ice   []w/heat  Position:  Location:       []  Traction: [] Cervical       []Lumbar                       [] Prone          []Supine                       []Intermittent   []Continuous Lbs:  [] before manual  [] after manual  []w/heat    []  Ultrasound: []Continuous   [] Pulsed                       at: []1MHz   []3MHz Location:  W/cm2:    [] Paraffin         Location:   []w/heat    []  Ice     []  Heat  []  Ice massage Position:  Location:    []  Laser  []  Other: Position:  Location:      []  Vasopneumatic Device Pressure:       [] lo [] med [] hi   Temperature:      [x] Skin assessment post-treatment:  [x]intact []redness- no adverse reaction    []redness  adverse reaction:     32 min Therapeutic Exercise:  - See flow sheet : Rec elliptical,SLR, bridges, piriformis stretch, TG squats  Measurements taken for progress note. Rationale: increase ROM, increase strength, improve coordination, improve balance and increase proprioception to improve the patients ability to perform ADLs, daily chores with improved balance       15 min Neuromuscular Re-education:  [x]  See flow sheet : tandem stance on foam, SLS on foam, rockerboard to challenge balance,  lateral stepping on long foam; retro walking, NBOS with EC on foam   Rationale: increase strength, improve coordination, improve balance and increase proprioception  to improve the patients ability to perform ADL's, daily chores with improved balance    8 min  Manual Therapy:  STM R glute max/med/piriformis    Rationale: decrease pain  to improve the patients ability to walk, perform daily chores with dec'd symptoms      With   [] TE   [] TA   [] neuro   [] other: Patient Education: [x] Review HEP    [] Progressed/Changed HEP based on:   [] positioning   [] body mechanics   [] transfers   [] heat/ice application    [] other:      Other Objective/Functional Measures:   Tandem Balance:  R in front: 28 (8 sec at evaluation)  L in front: 30+ sec (15 sec at evaluation)     SLS   R: 20 sec (10 sec at evaluation)  L: 23 sec (6 sec at evaluation)     Modified CTSIB:  Condition 1) 30 sec. Condition 2) 30 sec. Mod postural sway, req'd close supervision  Condition 3) 30 sec. Min postural sway  Condition 4) 15 sec. Mod to max postural sway, req'd close supervision (9 sec at eval)     5xSTS: (UE's crossed)  1) 24 sec (20 sec at eval)  2) 22 sec (19 sec at eval)     TU) 11 sec (15 sec at eval)  2) 12 sec (15 sec at eval)     Pain Level (0-10 scale) post treatment: 0     ASSESSMENT/Changes in Function:   Pt has completed 9 skilled PT visits. She demonstrates improvement in balance testing as well as TUG. Plan to continue PT for another 1-2 visits, then D/C to HEP.     Patient will continue to benefit from skilled PT services to modify and progress therapeutic interventions, address functional mobility deficits, address strength deficits, analyze and cue movement patterns, analyze and modify body mechanics/ergonomics, assess and modify postural abnormalities and address imbalance/dizziness to attain remaining goals. []  See Plan of Care  []  See progress note/recertification  []  See Discharge Summary         Progress towards goals / Updated goals:  Long Term Goals:  To be accomplished in 4-6 weeks:  1) Pt will be independent in final HEP MET  2) Pt will perform SLS for >/=10 sec without hip drop bilat in order to decrease fall risk MET  3) Pt will perform condition 4 of MCTSIB for >/=12 sec in order to demonstrate improvement in balance MET  4) Pt will demonstrate 5/5 bilat hip flexion strength in order to aid in exercise routine not met  5) Pt will report going to [facility] >/=1x/week and knowledge of appropriate exercise routine not met    PLAN  []  Upgrade activities as tolerated     [x]  Continue plan of care  []  Update interventions per flow sheet       []  Discharge due to:_  []  Other:_      Maria Luz Garcia, PT 8/9/2019

## 2019-08-12 ENCOUNTER — HOSPITAL ENCOUNTER (OUTPATIENT)
Dept: PHYSICAL THERAPY | Age: 79
Discharge: HOME | End: 2019-08-12
Payer: MEDICARE

## 2019-08-12 PROCEDURE — 97112 NEUROMUSCULAR REEDUCATION: CPT | Performed by: PHYSICAL THERAPIST

## 2019-08-12 PROCEDURE — 97110 THERAPEUTIC EXERCISES: CPT | Performed by: PHYSICAL THERAPIST

## 2019-08-12 PROCEDURE — 97140 MANUAL THERAPY 1/> REGIONS: CPT | Performed by: PHYSICAL THERAPIST

## 2019-08-12 NOTE — PROGRESS NOTES
PT DAILY TREATMENT NOTE - [facility] 2-15    Patient Name: Mary Certain  Date:2019  : 1940  [x]  Patient  Verified  Payor: Eh Lim / Plan: [place] MEDICARE PART A & B / Product Type: Medicare /    In time: 355 P  Out time: 450 A  Total Treatment Time (min): 55  Total Timed Codes (min): 55  1:1 Treatment Time ( only): 55   Visit #: 10    Treatment Area: Repeated falls [R29.6]    SUBJECTIVE  Pain Level (0-10 scale): R hip pain, number not reported  Any medication changes, allergies to medications, adverse drug reactions, diagnosis change, or new procedure performed?: [x] No    [] Yes (see summary sheet for update)  Subjective functional status/changes:   [] No changes reported  Pt doing well, she reports inc'd R hip pain today    OBJECTIVE    Modality rationale: decrease pain to improve the patients ability to walk, balance, perform ADLs with dec'd symptoms   Min Type Additional Details       [] Estim: []Att   []Unatt    []TENS instruct                  []IFC  []Premod   []NMES                     []Other:  []w/US   []w/ice   []w/heat  Position:  Location:       []  Traction: [] Cervical       []Lumbar                       [] Prone          []Supine                       []Intermittent   []Continuous Lbs:  [] before manual  [] after manual  []w/heat    []  Ultrasound: []Continuous   [] Pulsed                       at: []1MHz   []3MHz Location:  W/cm2:    [] Paraffin         Location:   []w/heat    []  Ice     []  Heat  []  Ice massage Position:  Location:    []  Laser  []  Other: Position:  Location:      []  Vasopneumatic Device Pressure:       [] lo [] med [] hi   Temperature:      [x] Skin assessment post-treatment:  [x]intact []redness- no adverse reaction    []redness  adverse reaction:     15 min Therapeutic Exercise:  - See flow sheet : Rec elliptical, TG squats, HS curl machine   Rationale: increase ROM, increase strength, improve coordination, improve balance and increase proprioception to improve the patients ability to perform ADLs, daily chores with improved balance       32 min Neuromuscular Re-education:  [x]  See flow sheet : tandem stance on foam, SLS on foam, rockerboard to challenge balance,  lateral stepping on long foam; retro walking while holding 3# MB, NBOS with EC on foam, ball toss in romberg stance, ball toss on foam, obstacle course in gym to challenge balance, walking with cervical rotations to challenge balance   Rationale: increase strength, improve coordination, improve balance and increase proprioception  to improve the patients ability to perform ADL's, daily chores with improved balance    8 min  Manual Therapy:  STM R glute max/med/piriformis    Rationale: decrease pain  to improve the patients ability to walk, perform daily chores with dec'd symptoms      With   [] TE   [] TA   [] neuro   [] other: Patient Education: [x] Review HEP    [] Progressed/Changed HEP based on:   [] positioning   [] body mechanics   [] transfers   [] heat/ice application    [] other:      Other Objective/Functional Measures:   n/a    Pain Level (0-10 scale) post treatment: 0     ASSESSMENT/Changes in Function:   Challenged with addition of balance exercises today, ted walking with cervical rotations while holding 3# medball    Patient will continue to benefit from skilled PT services to modify and progress therapeutic interventions, address functional mobility deficits, address strength deficits, analyze and cue movement patterns, analyze and modify body mechanics/ergonomics, assess and modify postural abnormalities and address imbalance/dizziness to attain remaining goals. []  See Plan of Care  []  See progress note/recertification  []  See Discharge Summary         Progress towards goals / Updated goals:  Long Term Goals:  To be accomplished in 4-6 weeks:  1) Pt will be independent in final HEP MET  2) Pt will perform SLS for >/=10 sec without hip drop bilat in order to decrease fall risk MET  3) Pt will perform condition 4 of MCTSIB for >/=12 sec in order to demonstrate improvement in balance MET  4) Pt will demonstrate 5/5 bilat hip flexion strength in order to aid in exercise routine not met  5) Pt will report going to [facility] >/=1x/week and knowledge of appropriate exercise routine not met    PLAN  []  Upgrade activities as tolerated     [x]  Continue plan of care  []  Update interventions per flow sheet       []  Discharge due to:_  []  Other:_      Emily Miller, PT 8/12/2019

## 2019-08-15 ENCOUNTER — HOSPITAL ENCOUNTER (OUTPATIENT)
Dept: PHYSICAL THERAPY | Age: 79
Discharge: HOME | End: 2019-08-15
Payer: MEDICARE

## 2019-08-15 PROCEDURE — 97110 THERAPEUTIC EXERCISES: CPT | Performed by: PHYSICAL THERAPIST

## 2019-08-15 PROCEDURE — 97112 NEUROMUSCULAR REEDUCATION: CPT | Performed by: PHYSICAL THERAPIST

## 2019-08-15 NOTE — PROGRESS NOTES
PT DAILY TREATMENT/Progress NOTE - [facility] 2-15    Patient Name: Talia Car  Date:8/15/2019  : 1940  [x]  Patient  Verified  Payor: [place] MEDICARE / Plan: VA MEDICARE PART A & B / Product Type: Medicare /    In time: 5943 A  Out time: 910 A  Total Treatment Time (min): 35  Total Timed Codes (min): 35  1:1 Treatment Time (MC only): 30   Visit #: 11    Treatment Area: Repeated falls [R29.6]    SUBJECTIVE  Pain Level (0-10 scale): R hip pain, number not reported  Any medication changes, allergies to medications, adverse drug reactions, diagnosis change, or new procedure performed?: [x] No    [] Yes (see summary sheet for update)  Subjective functional status/changes:   [] No changes reported  Pt reports that she feels stronger, feels she has better balance. OBJECTIVE    Modality rationale: decrease pain to improve the patients ability to walk, balance, perform ADLs with dec'd symptoms   Min Type Additional Details       [] Estim: []Att   []Unatt    []TENS instruct                  []IFC  []Premod   []NMES                     []Other:  []w/US   []w/ice   []w/heat  Position:  Location:       []  Traction: [] Cervical       []Lumbar                       [] Prone          []Supine                       []Intermittent   []Continuous Lbs:  [] before manual  [] after manual  []w/heat    []  Ultrasound: []Continuous   [] Pulsed                       at: []1MHz   []3MHz Location:  W/cm2:    [] Paraffin         Location:   []w/heat    []  Ice     []  Heat  []  Ice massage Position:  Location:    []  Laser  []  Other: Position:  Location:      []  Vasopneumatic Device Pressure:       [] lo [] med [] hi   Temperature:      [x] Skin assessment post-treatment:  [x]intact []redness- no adverse reaction    []redness  adverse reaction:     25 min Therapeutic Exercise:  - See flow sheet : Rec elliptical,SLR, bridges, piriformis stretch, TG squats  Measurements taken for progress note.    Rationale: increase ROM, increase strength, improve coordination, improve balance and increase proprioception to improve the patients ability to perform ADLs, daily chores with improved balance       10 min Neuromuscular Re-education:  [x]  See flow sheet : tandem stance on foam, SLS on foam, rockerboard to challenge balance,  lateral stepping on long foam; retro walking, NBOS with EC on foam   Rationale: increase strength, improve coordination, improve balance and increase proprioception  to improve the patients ability to perform ADL's, daily chores with improved balance    0 min  HELD-- Manual Therapy:  STM R glute max/med/piriformis    Rationale: decrease pain  to improve the patients ability to walk, perform daily chores with dec'd symptoms      With   [] TE   [] TA   [] neuro   [] other: Patient Education: [x] Review HEP    [] Progressed/Changed HEP based on:   [] positioning   [] body mechanics   [] transfers   [] heat/ice application    [] other:      Other Objective/Functional Measures (taken last week at least reassessment): Tandem Balance:  R in front: 28 (8 sec at evaluation)  L in front: 30+ sec (15 sec at evaluation)     SLS   R: 20 sec (10 sec at evaluation)  L: 23 sec (6 sec at evaluation)     Modified CTSIB:  Condition 1) 30 sec. Condition 2) 30 sec. Mod postural sway, req'd close supervision  Condition 3) 30 sec. Min postural sway  Condition 4) 15 sec. Mod to max postural sway, req'd close supervision (9 sec at eval)     5xSTS: (UE's crossed)  1) 24 sec (20 sec at eval)  2) 22 sec (19 sec at eval)     TU) 11 sec (15 sec at eval)  2) 12 sec (15 sec at eval)    Strength:  4/5 bilat hip flex     Pain Level (0-10 scale) post treatment: 0     ASSESSMENT/Changes in Function:   Pt has completed 11 skilled PT visits. She has met 3/5 PT goals. She demonstrates improvement in balance, functional mobility. Encouraged pt to continue with exercise program, go to gym. Reviewed HEP today, pt ready for D/C to HEP.      []  See Plan of Care  []  See progress note/recertification  [x]  See Discharge Summary         Progress towards goals / Updated goals:  Long Term Goals:  To be accomplished in 4-6 weeks:  1) Pt will be independent in final HEP MET  2) Pt will perform SLS for >/=10 sec without hip drop bilat in order to decrease fall risk MET  3) Pt will perform condition 4 of MCTSIB for >/=12 sec in order to demonstrate improvement in balance MET  4) Pt will demonstrate 5/5 bilat hip flexion strength in order to aid in exercise routine not met  5) Pt will report going to [facility] >/=1x/week and knowledge of appropriate exercise routine not met    PLAN  [x]  Discharge due to: pt progressing/met PT goals    Deanne Casillas PT 8/15/2019

## 2019-08-23 NOTE — ANCILLARY DISCHARGE INSTRUCTIONS
[facility] Physical Therapy  [address] [facility], [address] [place] [address]  Phone: [phone]  Fax: [phone]    Discharge Summary  2-15    Patient name: Roosevelt Machado  : 1940  Provider#:[id number]  Referral source: ABELINO Arita      Medical/Treatment Diagnosis: Repeated falls [R29.6]     Prior Hospitalization: see medical history     Comorbidities: see evaluation  Prior Level of Function:see evaluation  Medications: Verified on Patient Summary List    Start of Care: 19      Onset Date:see evaluation   Visits from Start of Care: 11     Missed Visits: see CC  Reporting Period : 19 to 8/15/19    Summary of care: Tandem Balance:  R in front: 28 (8 sec at evaluation)  L in front: 30+ sec (15 sec at evaluation)     SLS   R: 20 sec (10 sec at evaluation)  L: 23 sec (6 sec at evaluation)     Modified CTSIB:  Condition 1) 30 sec.   Condition 2) 30 sec. Mod postural sway, req'd close supervision  Condition 3) 30 sec. Min postural sway  Condition 4) 15 sec. Mod to max postural sway, req'd close supervision (9 sec at eval)     5xSTS: (UE's crossed)  1) 24 sec (20 sec at eval)  2) 22 sec (19 sec at eval)     TU) 11 sec (15 sec at eval)  2) 12 sec (15 sec at eval)     Strength:  4/5 bilat hip flex                Pain Level (0-10 scale) post treatment: 0     Progress towards goals  Long Term Goals:   1) Pt will be independent in final HEP MET  2) Pt will perform SLS for >/=10 sec without hip drop bilat in order to decrease fall risk MET  3) Pt will perform condition 4 of MCTSIB for >/=12 sec in order to demonstrate improvement in balance MET  4) Pt will demonstrate 5/5 bilat hip flexion strength in order to aid in exercise routine not met  5) Pt will report going to [facility] >/=1x/week and knowledge of appropriate exercise routine not met     ASSESSMENT/Changes in Function:   Pt has completed 11 skilled PT visits. She has met 3/5 PT goals.  She demonstrates improvement in balance, functional mobility. Encouraged pt to continue with exercise program, go to gym.  Reviewed HEP today, pt ready for D/C to HEP         RECOMMENDATIONS:  [x]Discontinue therapy: [x]Patient has reached or is progressing toward set goals      []Patient is non-compliant or has abdicated      []Due to lack of appreciable progress towards set [address] Nandini Chew, PT 8/23/2019 12:26 PM

## 2021-06-08 ENCOUNTER — HOSPITAL ENCOUNTER (OUTPATIENT)
Dept: PHYSICAL THERAPY | Age: 81
Discharge: HOME | End: 2021-06-08
Payer: MEDICARE

## 2021-06-08 PROCEDURE — 97161 PT EVAL LOW COMPLEX 20 MIN: CPT | Performed by: PHYSICAL THERAPIST

## 2021-06-08 PROCEDURE — 97110 THERAPEUTIC EXERCISES: CPT | Performed by: PHYSICAL THERAPIST

## 2021-06-08 NOTE — PROGRESS NOTES
[facility] Physical Therapy  [address] [facility], [address]  Phone: [phone]  Fax: [phone]    Plan of Care/Statement of Necessity for Physical Therapy Services  2-15    Patient name: Bravo Salvador  : 1940  Provider#: [id number]  Referral source: ABELINO Ness      Medical/Treatment Diagnosis: Repeated falls [R29.6]     Prior Hospitalization: see medical history     Comorbidities: see evaluation  Prior Level of Function:see evaluation  Medications: Verified on Patient Summary List  Start of Care: 2021     Onset Date:see evaluation     The Plan of Care and following information is based on the information from the initial evaluation.     Assessment/ key information: Patient presents with signs and symptoms consistent with dec strength, gait, balance and will benefit from physical therapy to address deficits noted below in problem list.   Evaluation Complexity History LOW Complexity : Zero comorbidities / personal factors that will impact the outcome / POC; Examination LOW Complexity : 1-2 Standardized tests and measures addressing body structure, function, activity limitation and / or participation in recreation  ;Presentation LOW Complexity : Stable, uncomplicated  ;Clinical Decision Making Other outcome measures clinical judgment  LOW   Overall Complexity Rating: LOW   Problem List: pain affecting function, decrease ROM, decrease strength, impaired gait/ balance, decrease ADL/ functional abilitiies, decrease activity tolerance and decrease flexibility/ joint mobility   Treatment Plan may include any combination of the following: Therapeutic exercise, Therapeutic activities, Neuromuscular re-education, Physical agent/modality, Manual therapy, Patient education and Self Care training  Patient / Family readiness to learn indicated by: asking questions, trying to perform skills and interest  Persons(s) to be included in education: patient (P)  Barriers to Learning/Limitations: None  Patient Goal (s): please see evaluation in Connect Care  Patient Self Reported Health Status: please see paper chart  Rehabilitation Potential: good    Short Term Goals: To be accomplished in 5 treatments:  -Independent in HEP as evidenced on ability to perform at least 5 exercises from HEP using proper form without verbal cuing. Long Term Goals: To be accomplished in 3 months:  -Pt will report standing/walking confidence has improved 50% to allow pt to do activities in community with greater ease. -SLS greater than or equal to 15 sec to help reduce fall risk  -Pt will be able to walk for 1 for exercise to help improve CV fitness. Frequency / Duration: Patient to be seen 2 times per week for 3 months. Patient/ Caregiver education and instruction: self care, activity modification and exercises    [x]  Plan of care has been reviewed with PTA    Certification Period: 6/8/2021 -  9/8/21    Mena Chung. Berry, PT, DPT, CMTPT      4/8/9408 8:16 PM  PT License Number: [id number]  _____________________________________________________________________    I certify that the above Therapy Services are being furnished while the patient is under my care. I agree with the treatment plan and certify that this therapy is necessary.     [de-identified] Signature:____________________  Date:____________Time:_________
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
## 2021-06-10 ENCOUNTER — HOSPITAL ENCOUNTER (OUTPATIENT)
Dept: PHYSICAL THERAPY | Age: 81
Discharge: HOME | End: 2021-06-10
Payer: MEDICARE

## 2021-06-10 PROCEDURE — 97110 THERAPEUTIC EXERCISES: CPT | Performed by: PHYSICAL THERAPIST

## 2021-06-10 NOTE — PROGRESS NOTES
PT DAILY TREATMENT NOTE - [facility] 2-15    Patient Name: Elo Walls  Date:6/10/2021  : 1940  [x]  Patient  Verified  Payor: VA MEDICARE / Plan: VA MEDICARE PART A & B / Product Type: Medicare /    In time:1115 A  Out time:1215 P  Total Treatment Time (min): 60  Total Timed Codes (min): 60  1:1 Treatment Time (MC/[place] only): 54   Visit #:  2    Treatment Area: Repeated falls [R29.6]    SUBJECTIVE  Pain Level (0-10 scale): 4  Any medication changes, allergies to medications, adverse drug reactions, diagnosis change, or new procedure performed?: [x] No    [] Yes (see summary sheet for update)  Subjective functional status/changes:     Pt reports exercises are going okay. OBJECTIVE      [x] Skin assessment post-treatment:  [x]intact []redness- no adverse reaction    []redness  adverse reaction:     60 min Therapeutic Exercise:  [x] See flow sheet :   Rationale: increase strength, improve coordination, improve balance, and increase proprioception to improve the patients ability to perform ADL's            With   [] TE   [] manual Patient Education: [x] Review HEP    [] Progressed/Changed HEP based on:   [] positioning   [] body mechanics   [] transfers   [] heat/ice application    [] other:      Other Objective/Functional Measures:       Pain Level (0-10 scale) post treatment: 0    ASSESSMENT/Changes in Function:     Patient will continue to benefit from skilled PT services to modify and progress therapeutic interventions, address functional mobility deficits, address strength deficits, analyze and address soft tissue restrictions, analyze and cue movement patterns, and analyze and modify body mechanics/ergonomics to attain remaining goals. Progress towards goals / Updated goals:    Pt challenged by proprioceptive challenges today in parallel bars. Able to tolerate step ups CGA on 2 inch step with mild difficulty and no use of hands.      PLAN  []  Upgrade activities as tolerated     [] Continue plan of care  []  Update interventions per flow sheet       []  Discharge due to:_  []  Other:_      Catherine Anrdews.  Berry, PT 6/10/2021

## 2021-06-15 ENCOUNTER — HOSPITAL ENCOUNTER (OUTPATIENT)
Dept: PHYSICAL THERAPY | Age: 81
Discharge: HOME | End: 2021-06-15
Payer: MEDICARE

## 2021-06-15 PROCEDURE — 97110 THERAPEUTIC EXERCISES: CPT | Performed by: PHYSICAL THERAPIST

## 2021-06-15 NOTE — PROGRESS NOTES
PT DAILY TREATMENT NOTE - [facility] 2-15    Patient Name: Bravo Salvador  Date:6/15/2021  : 1940  [x]  Patient  Verified  Payor: VA MEDICARE / Plan: VA MEDICARE PART A & B / Product Type: Medicare /    In time:1250 P   Out time:135 P  Total Treatment Time (min):45  Total Timed Codes (min):45  1:1 Treatment Time (MC/[place] only): 39  Visit #:  3    Treatment Area: Repeated falls [R29.6]    SUBJECTIVE  Pain Level (0-10 scale): 4  Any medication changes, allergies to medications, adverse drug reactions, diagnosis change, or new procedure performed?: [x] No    [] Yes (see summary sheet for update)  Subjective functional status/changes:     Pt reports she walked all day this weekend. Very sore and also had a HA. Had COVID test because was concerned it was COVID. Pt tested neg but is still fatigued. OBJECTIVE      [x] Skin assessment post-treatment:  [x]intact []redness- no adverse reaction    []redness  adverse reaction:     45 min Therapeutic Exercise:  [x] See flow sheet :   Rationale: increase strength, improve coordination, improve balance, and increase proprioception to improve the patients ability to perform ADL's            With   [] TE   [] manual Patient Education: [x] Review HEP    [] Progressed/Changed HEP based on:   [] positioning   [] body mechanics   [] transfers   [] heat/ice application    [] other:      Other Objective/Functional Measures:       Pain Level (0-10 scale) post treatment: 0    ASSESSMENT/Changes in Function:     Patient will continue to benefit from skilled PT services to modify and progress therapeutic interventions, address functional mobility deficits, address strength deficits, analyze and address soft tissue restrictions, analyze and cue movement patterns, and analyze and modify body mechanics/ergonomics to attain remaining goals.             Progress towards goals / Updated goals:        PLAN  []  Upgrade activities as tolerated     []  Continue plan of care  []  Update interventions per flow sheet       []  Discharge due to:_  []  Other:_      Felicia Garcia.  Berry, PT 6/15/2021

## 2021-06-17 ENCOUNTER — HOSPITAL ENCOUNTER (OUTPATIENT)
Dept: PHYSICAL THERAPY | Age: 81
Discharge: HOME | End: 2021-06-17
Payer: MEDICARE

## 2021-06-17 PROCEDURE — 97110 THERAPEUTIC EXERCISES: CPT | Performed by: PHYSICAL THERAPIST

## 2021-06-17 NOTE — PROGRESS NOTES
PT DAILY TREATMENT NOTE - [facility] 2-15    Patient Name: Kamilah Burgos  Date:2021  : 1940  [x]  Patient  Verified  Payor: VA MEDICARE / Plan: VA MEDICARE PART A & B / Product Type: Medicare /    In time:945 A   Out time:1045 A  Total Treatment Time (min):60  Total Timed Codes (min):60  1:1 Treatment Time (MC/[place] only): 60  Visit #:  4    Treatment Area: Repeated falls [R29.6]    SUBJECTIVE  Pain Level (0-10 scale): 3  Any medication changes, allergies to medications, adverse drug reactions, diagnosis change, or new procedure performed?: [x] No    [] Yes (see summary sheet for update)  Subjective functional status/changes:     Feeling better today than last time. Getting excited for her trip to the [facility] in July. OBJECTIVE      [x] Skin assessment post-treatment:  [x]intact []redness- no adverse reaction    []redness  adverse reaction:     60 min Therapeutic Exercise:  [x] See flow sheet :   Rationale: increase strength, improve coordination, improve balance, and increase proprioception to improve the patients ability to perform ADL's            With   [] TE   [] manual Patient Education: [x] Review HEP    [] Progressed/Changed HEP based on:   [] positioning   [] body mechanics   [] transfers   [] heat/ice application    [] other:      Other Objective/Functional Measures:       Pain Level (0-10 scale) post treatment: 0    ASSESSMENT/Changes in Function:     Patient will continue to benefit from skilled PT services to modify and progress therapeutic interventions, address functional mobility deficits, address strength deficits, analyze and address soft tissue restrictions, analyze and cue movement patterns, and analyze and modify body mechanics/ergonomics to attain remaining goals. Progress towards goals / Updated goals:      Pt requires SBA for tandem walking and high knee marching. Tilt board A/P and M/L shows greater control today than last time.    PLAN  []  Upgrade activities as tolerated     []  Continue plan of care  []  Update interventions per flow sheet       []  Discharge due to:_  []  Other:_      Ronaldo Smart, PT 6/17/2021

## 2021-06-22 ENCOUNTER — HOSPITAL ENCOUNTER (OUTPATIENT)
Dept: PHYSICAL THERAPY | Age: 81
Discharge: HOME | End: 2021-06-22
Payer: MEDICARE

## 2021-06-22 PROCEDURE — 97110 THERAPEUTIC EXERCISES: CPT | Performed by: PHYSICAL THERAPIST

## 2021-06-22 NOTE — PROGRESS NOTES
PT DAILY TREATMENT NOTE - [facility] 2-15    Patient Name: Agusto Roldan  Date:2021  : 1940  [x]  Patient  Verified  Payor: Zhane Mcbride / Plan: VA MEDICARE PART A & B / Product Type: Medicare /    In time:1200 P   Out time:1245 P  Total Treatment Time (min):45  Total Timed Codes (min)45  1:1 Treatment Time (MC/[place] only): 45  Visit #:  5    Treatment Area: Repeated falls [R29.6]    SUBJECTIVE  Pain Level (0-10 scale): 3  Any medication changes, allergies to medications, adverse drug reactions, diagnosis change, or new procedure performed?: [x] No    [] Yes (see summary sheet for update)  Subjective functional status/changes:     Pt much better, balance improving. OBJECTIVE      [x] Skin assessment post-treatment:  [x]intact []redness- no adverse reaction    []redness  adverse reaction:     45 min Therapeutic Exercise:  [x] See flow sheet :   Rationale: increase strength, improve coordination, improve balance, and increase proprioception to improve the patients ability to perform ADL's            With   [] TE   [] manual Patient Education: [x] Review HEP    [] Progressed/Changed HEP based on:   [] positioning   [] body mechanics   [] transfers   [] heat/ice application    [] other:      Other Objective/Functional Measures:       Pain Level (0-10 scale) post treatment: 0    ASSESSMENT/Changes in Function:     Patient will continue to benefit from skilled PT services to modify and progress therapeutic interventions, address functional mobility deficits, address strength deficits, analyze and address soft tissue restrictions, analyze and cue movement patterns, and analyze and modify body mechanics/ergonomics to attain remaining goals. Progress towards goals / Updated goals:    Pt challenged by removal of visual inputs today with dec JOVON on foam pad.    PLAN  []  Upgrade activities as tolerated     []  Continue plan of care  []  Update interventions per flow sheet       []  Discharge due to:_  [x]  Other:_  Progress  Proprioception challenges. Aaron Vela.  Berry, PT 6/22/2021

## 2021-06-24 ENCOUNTER — HOSPITAL ENCOUNTER (OUTPATIENT)
Dept: PHYSICAL THERAPY | Age: 81
Discharge: HOME | End: 2021-06-24
Payer: MEDICARE

## 2021-06-24 PROCEDURE — 97110 THERAPEUTIC EXERCISES: CPT | Performed by: PHYSICAL THERAPIST

## 2021-06-24 NOTE — PROGRESS NOTES
PT DAILY TREATMENT NOTE - [facility] 2-15    Patient Name: Annetta Salazar  Date:2021  : 1940  [x]  Patient  Verified  Payor: Celine Kaiser / Plan: VA MEDICARE PART A & B / Product Type: Medicare /    In time:945 A    Out time:1045 A  Total Treatment Time (min):60  Total Timed Codes (min)60  1:1 Treatment Time (MC/[place] only):60  Visit #:  6    Treatment Area: Repeated falls [R29.6]    SUBJECTIVE  Pain Level (0-10 scale): 2  Any medication changes, allergies to medications, adverse drug reactions, diagnosis change, or new procedure performed?: [x] No    [] Yes (see summary sheet for update)  Subjective functional status/changes:     Pt will be needling to carry two bags with her on vacation. OBJECTIVE      [x] Skin assessment post-treatment:  [x]intact []redness- no adverse reaction    []redness  adverse reaction:     60 min Therapeutic Exercise:  [x] See flow sheet :   Rationale: increase strength, improve coordination, improve balance, and increase proprioception to improve the patients ability to perform ADL's            With   [] TE   [] manual Patient Education: [x] Review HEP    [] Progressed/Changed HEP based on:   [] positioning   [] body mechanics   [] transfers   [] heat/ice application    [] other:      Other Objective/Functional Measures:       Pain Level (0-10 scale) post treatment: 0    ASSESSMENT/Changes in Function:     Patient will continue to benefit from skilled PT services to modify and progress therapeutic interventions, address functional mobility deficits, address strength deficits, analyze and address soft tissue restrictions, analyze and cue movement patterns, and analyze and modify body mechanics/ergonomics to attain remaining goals. Progress towards goals / Updated goals:    Pt challenged by strengthing UE on foam pad and change of directions on foam pad.     PLAN  []  Upgrade activities as tolerated     []  Continue plan of care  []  Update interventions per flow sheet       []  Discharge due to:_  [x]  Other:_  Progress  Proprioception challenges. Aaron Vela.  Berry, PT 6/24/2021

## 2021-06-29 ENCOUNTER — HOSPITAL ENCOUNTER (OUTPATIENT)
Dept: PHYSICAL THERAPY | Age: 81
Discharge: HOME | End: 2021-06-29
Payer: MEDICARE

## 2021-06-29 PROCEDURE — 97110 THERAPEUTIC EXERCISES: CPT | Performed by: PHYSICAL THERAPIST

## 2021-06-29 PROCEDURE — 97140 MANUAL THERAPY 1/> REGIONS: CPT | Performed by: PHYSICAL THERAPIST

## 2021-06-29 NOTE — PROGRESS NOTES
PT DAILY TREATMENT NOTE - [facility] 2-15    Patient Name: Quinten Span  Date:2021  : 1940  [x]  Patient  Verified  Payor: Scott Neal / Plan: [place] MEDIC[place] PART A & B / Product Type: Medicare /    In time:1030 A    Out time:1130 A   Total Treatment Time (min):60  Total Timed Codes (min)50  1:1 Treatment Time ([facility]/[place] only):40  Visit #:  7    Treatment Area: Repeated falls [R29.6]    SUBJECTIVE  Pain Level (0-10 scale): 8  Any medication changes, allergies to medications, adverse drug reactions, diagnosis change, or new procedure performed?: [x] No    [] Yes (see summary sheet for update)  Subjective functional status/changes:     Back is really bothering her today. Hurts when she is walking. OBJECTIVE      [x] Skin assessment post-treatment:  [x]intact []redness- no adverse reaction    []redness  adverse reaction:     35 min Therapeutic Exercise:  [x] See flow sheet :   Rationale: increase strength, improve coordination, improve balance, and increase proprioception to improve the patients ability to perform ADL's    15 min Manual Therapy: MFR sanjuana lumbar paraspinals, glute max. Rationale: decrease pain, increase ROM, increase tissue extensibility, decrease trigger points and improve joint mobility to improve the patients ability to walk. With   [] TE   [] manual Patient Education: [x] Review HEP    [] Progressed/Changed HEP based on:   [] positioning   [] body mechanics   [] transfers   [] heat/ice application    [] other:      Other Objective/Functional Measures:   TTP over sanjuana lumbar paraspinals, glute max.      Pain Level (0-10 scale) post treatment: 6    ASSESSMENT/Changes in Function:     Patient will continue to benefit from skilled PT services to modify and progress therapeutic interventions, address functional mobility deficits, address strength deficits, analyze and address soft tissue restrictions, analyze and cue movement patterns, and analyze and modify body mechanics/ergonomics to attain remaining goals. Progress towards goals / Updated goals:    Pt with dec pain post manual today. PLAN  []  Upgrade activities as tolerated     []  Continue plan of care  []  Update interventions per flow sheet       []  Discharge due to:_  [x]  Other:_  Progress  Proprioception challenges. Avel Smart, PT 6/29/2021

## 2021-07-06 ENCOUNTER — HOSPITAL ENCOUNTER (OUTPATIENT)
Dept: PHYSICAL THERAPY | Age: 81
Discharge: HOME | End: 2021-07-06
Payer: MEDICARE

## 2021-07-06 PROCEDURE — 97110 THERAPEUTIC EXERCISES: CPT | Performed by: PHYSICAL THERAPIST

## 2021-07-06 NOTE — PROGRESS NOTES
PT DAILY TREATMENT NOTE - [facility] 2-15    Patient Name: Brittni Nagel  Date:2021  : 1940  [x]  Patient  Verified  Payor: [place] MEDICARE / Plan: [place] MEDICARE PART A & B / Product Type: Medicare /    In time:950 A  Out time:1050 A   Total Treatment Time (min):60  Total Timed Codes (min)60  1:1 Treatment Time ([facility]/[place] only):40  Visit #:  8    Treatment Area: Repeated falls [R29.6]    SUBJECTIVE  Pain Level (0-10 scale): 0  Any medication changes, allergies to medications, adverse drug reactions, diagnosis change, or new procedure performed?: [x] No    [] Yes (see summary sheet for update)  Subjective functional status/changes:     No pain , feels a bit unsteady when walking at times. OBJECTIVE      [x] Skin assessment post-treatment:  [x]intact []redness- no adverse reaction    []redness  adverse reaction:     60 min Therapeutic Exercise:  [x] See flow sheet :   Rationale: increase strength, improve coordination, improve balance, and increase proprioception to improve the patients ability to perform ADL's              With   [] TE   [] manual Patient Education: [x] Review HEP    [] Progressed/Changed HEP based on:   [] positioning   [] body mechanics   [] transfers   [] heat/ice application    [x] other: Education re: obtaining proper footwear to decrease ground reaction forces up the chain. Pt advised to go to [employer] to get fitted for proper shoe. Other Objective/Functional Measures:     Pain Level (0-10 scale) post treatment: 0    ASSESSMENT/Changes in Function:                 Progress towards goals / Updated goals:    Pt showing improved performance on tilt board today. Proprioception improving. PLAN  []  Upgrade activities as tolerated     []  Continue plan of care  []  Update interventions per flow sheet       []  Discharge due to:_  [x]  Other:_  Pt to head to [facility] for 1 month. To call when she returns. Progress note/re-assessment to be performed at that time. Corey Smart, PT 7/6/2021

## 2022-06-10 LAB
CREATININE, EXTERNAL: 1.15
HBA1C MFR BLD HPLC: 7.6 %
LDL-C, EXTERNAL: 107
MICROALBUMIN UR TEST STR-MCNC: 64 MG/DL

## 2022-08-24 ENCOUNTER — OFFICE VISIT (OUTPATIENT)
Dept: ENDOCRINOLOGY | Age: 82
End: 2022-08-24
Payer: MEDICARE

## 2022-08-24 VITALS
HEART RATE: 62 BPM | SYSTOLIC BLOOD PRESSURE: 128 MMHG | HEIGHT: 61 IN | BODY MASS INDEX: 24.2 KG/M2 | WEIGHT: 128.2 LBS | DIASTOLIC BLOOD PRESSURE: 66 MMHG

## 2022-08-24 DIAGNOSIS — E78.5 HYPERLIPIDEMIA LDL GOAL <100: ICD-10-CM

## 2022-08-24 DIAGNOSIS — E11.9 TYPE 2 DIABETES MELLITUS WITHOUT COMPLICATION, WITHOUT LONG-TERM CURRENT USE OF INSULIN (HCC): Primary | ICD-10-CM

## 2022-08-24 DIAGNOSIS — I10 ESSENTIAL HYPERTENSION, BENIGN: ICD-10-CM

## 2022-08-24 LAB — HBA1C MFR BLD HPLC: 7.6 %

## 2022-08-24 PROCEDURE — G8420 CALC BMI NORM PARAMETERS: HCPCS | Performed by: INTERNAL MEDICINE

## 2022-08-24 PROCEDURE — G8400 PT W/DXA NO RESULTS DOC: HCPCS | Performed by: INTERNAL MEDICINE

## 2022-08-24 PROCEDURE — 1123F ACP DISCUSS/DSCN MKR DOCD: CPT | Performed by: INTERNAL MEDICINE

## 2022-08-24 PROCEDURE — G8752 SYS BP LESS 140: HCPCS | Performed by: INTERNAL MEDICINE

## 2022-08-24 PROCEDURE — 99204 OFFICE O/P NEW MOD 45 MIN: CPT | Performed by: INTERNAL MEDICINE

## 2022-08-24 PROCEDURE — G8754 DIAS BP LESS 90: HCPCS | Performed by: INTERNAL MEDICINE

## 2022-08-24 PROCEDURE — G8536 NO DOC ELDER MAL SCRN: HCPCS | Performed by: INTERNAL MEDICINE

## 2022-08-24 PROCEDURE — 3051F HG A1C>EQUAL 7.0%<8.0%: CPT | Performed by: INTERNAL MEDICINE

## 2022-08-24 PROCEDURE — G8432 DEP SCR NOT DOC, RNG: HCPCS | Performed by: INTERNAL MEDICINE

## 2022-08-24 PROCEDURE — 83036 HEMOGLOBIN GLYCOSYLATED A1C: CPT | Performed by: INTERNAL MEDICINE

## 2022-08-24 PROCEDURE — 1101F PT FALLS ASSESS-DOCD LE1/YR: CPT | Performed by: INTERNAL MEDICINE

## 2022-08-24 PROCEDURE — G8427 DOCREV CUR MEDS BY ELIG CLIN: HCPCS | Performed by: INTERNAL MEDICINE

## 2022-08-24 PROCEDURE — 1090F PRES/ABSN URINE INCON ASSESS: CPT | Performed by: INTERNAL MEDICINE

## 2022-08-24 RX ORDER — ROSUVASTATIN CALCIUM 5 MG/1
TABLET, COATED ORAL
Qty: 40 TABLET | Refills: 3 | Status: SHIPPED | OUTPATIENT
Start: 2022-08-24

## 2022-08-24 RX ORDER — GLIPIZIDE 10 MG/1
10 TABLET ORAL 2 TIMES DAILY
Qty: 180 TABLET | Refills: 3 | Status: SHIPPED | OUTPATIENT
Start: 2022-08-24 | End: 2022-08-29

## 2022-08-24 RX ORDER — LANOLIN ALCOHOL/MO/W.PET/CERES
CREAM (GRAM) TOPICAL
COMMUNITY

## 2022-08-24 RX ORDER — SITAGLIPTIN AND METFORMIN HYDROCHLORIDE 50; 1000 MG/1; MG/1
1 TABLET, FILM COATED ORAL 2 TIMES DAILY WITH MEALS
COMMUNITY
Start: 2022-06-16

## 2022-08-24 RX ORDER — PAROXETINE HYDROCHLORIDE 20 MG/1
20 TABLET, FILM COATED ORAL DAILY
COMMUNITY

## 2022-08-24 RX ORDER — GLIPIZIDE 10 MG/1
10 TABLET ORAL 2 TIMES DAILY
COMMUNITY
Start: 2022-06-13 | End: 2022-08-24 | Stop reason: SDUPTHER

## 2022-08-24 NOTE — PATIENT INSTRUCTIONS
1) Melinda glipizide 5-10 minutos antes de desayuno y [place]. 2) Nichole A1c (examen de 3 meses de azucar) esta 7.6% y prefero que el esta menos de 8%. Entonces nichole diabetes esta controlado. 3) Melinda rosuvastatin (crestor) para colesterol 1 pastilla antes de tabitha 3 veces por semana. 4) Nichole presion esta controlado.

## 2022-08-24 NOTE — PROGRESS NOTES
Chief Complaint   Patient presents with    New Patient     PCP and pharmacy confirmed     Diabetes     Eye exam report requested      History of Present Illness: Pam Willard is a 78 y.o. female who is a new patient for evaluation of diabetes. Her daughter is Brandin Esquivel who works for [email or web address] in [facility]. Was diagnosed with diabetes about 20 years ago. Current regimen is janumet 50/1000 bid and glipizide 10 mg bid but tends to take after eating. Checks blood sugars randomly during they day sometimes fasting and sometimes . Most recent Hgb A1c was 7.6% today stable from 6/22 and up from 7.5% in 3/22. A typical day is as follows:  - breakfast: 2 pieces of Dieudonne bread with cream cheese or cheese, cereal  - lunch: bread with cheese or tuna fish or chicken salad  - dinner: beef or chicken or fish with vegetables, occ potato or rice  - beverages: water or coffee  - snacks: 2 pieces of fruit  Exercise consists of using the gym at her complex and does chair yoga and some lifting. No history of vascular disease. No history of retinopathy or neuropathy (+) mild nephropathy with microalbumin of 64 in 6/22. Last eye exam was about a year ago. She had been prescribed crestor in the past but apparently asked to come off this as she didn't want to take anything for cholesterol but after discussion with she and her daughter, she is agreeable to using a low dose of crestor again to help prevent vascular disease. Past Medical History:   Diagnosis Date    Diabetes ([address] 2002    Diverticulitis of colon     Hypercholesterolemia     Hypertension     Osteopenia      Past Surgical History:   Procedure Laterality Date    ENDOSCOPY, COLON, DIAGNOSTIC  2012    small polyp    HX CHOLECYSTECTOMY      HX GI  2012    EGD gastric polyp    HX HEENT  5/2003    cat. ext. O.S. - bilateral     Current Outpatient Medications   Medication Sig    Janumet 50-1,000 mg per tablet 1 Tablet two (2) times daily (with meals).     glipiZIDE (GLUCOTROL) 10 mg tablet Take 10 mg by mouth two (2) times a day. ferrous sulfate 325 mg (65 mg iron) tablet Take  by mouth Daily (before breakfast). PARoxetine (PaxiL) 20 mg tablet Take 20 mg by mouth daily. ibuprofen (MOTRIN) 200 mg tablet Take 200 mg by mouth every six (6) hours as needed for Pain.    triamterene-hydrochlorothiazide (MAXZIDE) 37.5-25 mg per tablet take 1 tablet by mouth once daily    ramipril (ALTACE) 2.5 mg capsule Take  by mouth daily. verapamil CR (VERELAN) 180 mg CR capsule Take 1 Cap by mouth daily. No current facility-administered medications for this visit. No Known Allergies    Family History   Problem Relation Age of Onset    Kidney Disease Mother         renal failure    Stroke Mother     Heart Disease Mother     Hypertension Mother     Lung Disease Father     Alcohol abuse Father      Social History     Socioeconomic History    Marital status:      Spouse name: Not on file    Number of children: Not on file    Years of education: Not on file    Highest education level: Not on file   Occupational History    Not on file   Tobacco Use    Smoking status: Former    Smokeless tobacco: Never    Tobacco comments:     former cigarette smoker - quit 45 yrs ago   Substance and Sexual Activity    Alcohol use: Yes     Comment: has a very small glass of wine most days of the week    Drug use: Never    Sexual activity: Not on file   Other Topics Concern    Not on file   Social History Narrative    Lives in [place] with her daughter.  in Dec 2021 and had been  for 62 years. Didn't use to work.      Social Determinants of Health     Financial Resource Strain: Not on file   Food Insecurity: Not on file   Transportation Needs: Not on file   Physical Activity: Not on file   Stress: Not on file   Social Connections: Not on file   Intimate Partner Violence: Not on file   Housing Stability: Not on file     Review of Systems: per HPI    Physical Examination:  Blood pressure 128/66, pulse 62, height 5' 1\" (1.549 m), weight 128 lb 3.2 oz (58.2 kg). General: pleasant, no distress, good eye contact  HEENT: no exopthalmos, no periorbital edema, no scleral/conjunctival injection, EOMI,   Neck: supple, no thyromegaly, masses, lymph nodes, or carotid bruits, no supraclavicular or dorsocervical fat pads  Cardiovascular: regular, normal rate, normal S1 and S2, no murmurs/rubs/gallops,  Respiratory: clear to auscultation bilaterally  Gastrointestinal: soft, nontender, nondistended, no masses, no hepatosplenomegaly  Musculoskeletal: no proximal muscle weakness in upper or lower extremities  Integumentary: no acanthosis nigricans, no rashes, no edema, no foot ulcers  Neurological: see foot exam  Psychiatric: normal mood and affect    Diabetic foot exam:     Left Foot:   Visual Exam: normal    Pulse DP: 2+ (normal)   Filament test: normal sensation    Vibratory sensation: Vibratory sensation: diminished       Right Foot:   Visual Exam: normal    Pulse DP: 2+ (normal)   Filament test: normal sensation    Vibratory sensation: Vibratory sensation: diminished            Data Reviewed: 6/10/22  - Hgb A1c 7.6%  - lipids: total 176 ,  HDL 51, TG 96,   - ALT 8, AST 9  - BUN/Cr 23/1.15  - vitamin D 50.6  - microalbumin 64    Component      Latest Ref Rng & Units 8/24/2022           9:10 AM   Hemoglobin A1c (POC)      % 7.6       Assessment/Plan:   1. Type 2 diabetes mellitus without complication, without long-term current use of insulin ([facility] [address]): Most recent Hgb A1c was 7.6% in 8/22 (1st visit with me) stable from 6/22 and up from 7.5% in 3/22. Her goal is <8% so she is at goal and no changes are needed to her regimen but does need to take her glipizide before she eats to prevent post-meal spikes. - cont janumet 50/1000 mg bid  - cont glipizide 10 mg bid  - check bs once daily at alternating times  - foot exam done 8/22  - microalbumin 64 in 6/22  - due for eye exam       2.  Essential hypertension, benign: her BP was at goal < 140/90  - cont current regimen        3. Hyperlipidemia LDL goal <100:  in 6/22 but would benefit from a statin to prevent vascular disease and is agreeable to restarting low dose crestor that she had been on previously. - begin crestor 5 mg 3x/week  - check lipids with PCP      Patient Instructions   1) Melinda glipizide 5-10 minutos antes de desayuno y [place]. 2) Nichole A1c (examen de 3 meses de azucar) esta 7.6% y prefero que el esta menos de 8%. Entonces nichole diabetes esta controlado. 3) Melinda rosuvastatin (crestor) para colesterol 1 pastilla antes de tabitha 3 veces por semana. 4) Nichole presion esta controlado. Follow-up and Dispositions    Return in about 6 months (around 2/24/2023).              Copy sent to:  Agustin Moreno MD

## 2022-08-29 ENCOUNTER — TELEPHONE (OUTPATIENT)
Dept: ENDOCRINOLOGY | Age: 82
End: 2022-08-29

## 2022-08-29 RX ORDER — GLIPIZIDE 5 MG/1
5 TABLET ORAL 2 TIMES DAILY
Qty: 180 TABLET | Refills: 3 | Status: SHIPPED | OUTPATIENT
Start: 2022-08-29

## 2022-08-29 NOTE — TELEPHONE ENCOUNTER
Spoke with her daughter, Christopher Luz, and she confirmed that her mother had actually been on glipizide 5 mg tabs from Dr. Suzie Pimentel, not 10 mg tabs and the correct dose should have been 5 mg twice daily and she's concerned that the 10 mg tabs will be too much. She just picked up the 10 mg tabs today but states if I send in a prescription to [facility] for 5 mg tabs, she can get a credit so I told her I would do that now.

## 2022-08-29 NOTE — TELEPHONE ENCOUNTER
8/29/2022    Pt's daughter called and left a vm at 3:19 pm stating she is calling regarding Glipizide 10mg. No other information was disclosed. She can be reached at [phone].     Thanks,   Piotr Araiza

## 2022-10-19 ENCOUNTER — TRANSCRIBE ORDER (OUTPATIENT)
Dept: SCHEDULING | Age: 82
End: 2022-10-19

## 2022-10-19 DIAGNOSIS — Z12.31 ENCOUNTER FOR SCREENING MAMMOGRAM FOR MALIGNANT NEOPLASM OF BREAST: Primary | ICD-10-CM

## 2022-11-06 ENCOUNTER — APPOINTMENT (OUTPATIENT)
Dept: CT IMAGING | Age: 82
End: 2022-11-06
Attending: STUDENT IN AN ORGANIZED HEALTH CARE EDUCATION/TRAINING PROGRAM
Payer: MEDICARE

## 2022-11-06 ENCOUNTER — HOSPITAL ENCOUNTER (EMERGENCY)
Age: 82
Discharge: HOME | End: 2022-11-06
Attending: STUDENT IN AN ORGANIZED HEALTH CARE EDUCATION/TRAINING PROGRAM
Payer: MEDICARE

## 2022-11-06 VITALS
HEART RATE: 70 BPM | SYSTOLIC BLOOD PRESSURE: 163 MMHG | TEMPERATURE: 97 F | RESPIRATION RATE: 17 BRPM | DIASTOLIC BLOOD PRESSURE: 62 MMHG | OXYGEN SATURATION: 93 %
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
## 2022-11-06 PROCEDURE — 99284 EMERGENCY DEPT VISIT MOD MDM: CPT

## 2022-11-06 PROCEDURE — 70450 CT HEAD/BRAIN W/O DYE: CPT

## 2022-11-07 NOTE — ED NOTES
Pain assessment on discharge was   Condition Stable  Patient discharged to home  Patient education was completed  Education taught to patient and family  Teaching method used was handout and verbal  Understanding of teaching was good  Patient was discharged ambulatory  Discharged with family  Valuables were given to: family/patient remained in possession of belongings during stay.

## 2022-11-07 NOTE — ED TRIAGE NOTES
Patient arrives to the ED with chief complaint of hitting her head after a fall. She tripped over the dog. Denies blood thinners, dizziness, and vision changes.

## 2022-11-07 NOTE — ED PROVIDER NOTES
40-year-old female with history of DM, HTN, HLD presents to the ED with chief complaint of ground-level fall at home. Patient tripped over her pet, fell and hit the right side of her head on the ground. Did not lose consciousness. Had mild lightheadedness afterwards that has since resolved. Denies any headache, nausea, vomiting, neck pain, chest pain, abdominal pain, urinary symptoms, bowel symptoms. She is not on any blood thinners. No wounds. The history is provided by the patient. Fall  Pertinent negatives include no fever, no numbness, no abdominal pain, no nausea, no vomiting, no hematuria and no headaches. Head Injury   Pertinent negatives include no chest pain, no numbness, no abdominal pain, no nausea, no vomiting, no headaches, no neck pain, no light-headedness, no weakness and no cough. Past Medical History:   Diagnosis Date    Diabetes ([address] 2002    Diverticulitis of colon     Hypercholesterolemia     Hypertension     Osteopenia        Past Surgical History:   Procedure Laterality Date    ENDOSCOPY, COLON, DIAGNOSTIC  2012    small polyp    HX CHOLECYSTECTOMY      HX GI  2012    EGD gastric polyp    HX HEENT  5/2003    cat. ext.  O.S. - bilateral         Family History:   Problem Relation Age of Onset    Kidney Disease Mother         renal failure    Stroke Mother     Heart Disease Mother     Hypertension Mother     Lung Disease Father     Alcohol abuse Father        Social History     Socioeconomic History    Marital status:      Spouse name: Not on file    Number of children: Not on file    Years of education: Not on file    Highest education level: Not on file   Occupational History    Not on file   Tobacco Use    Smoking status: Former    Smokeless tobacco: Never    Tobacco comments:     former cigarette smoker - quit 45 yrs ago   Substance and Sexual Activity    Alcohol use: Yes     Comment: has a very small glass of wine most days of the week    Drug use: Never    Sexual activity: Not on file   Other Topics Concern    Not on file   Social History Narrative    Lives in [place] with her daughter.  in Dec 2021 and had been  for 62 years. Didn't use to work. Social Determinants of Health     Financial Resource Strain: Not on file   Food Insecurity: Not on file   Transportation Needs: Not on file   Physical Activity: Not on file   Stress: Not on file   Social Connections: Not on file   Intimate Partner Violence: Not on file   Housing Stability: Not on file         ALLERGIES: Patient has no known allergies. Review of Systems   Constitutional:  Negative for chills and fever. HENT:  Negative for congestion and rhinorrhea. Respiratory:  Negative for cough and shortness of breath. Cardiovascular:  Negative for chest pain and leg swelling. Gastrointestinal:  Negative for abdominal pain, constipation, diarrhea, nausea and vomiting. Genitourinary:  Negative for difficulty urinating, dysuria and hematuria. Musculoskeletal:  Negative for back pain and neck pain. Skin:  Negative for color change and rash. Neurological:  Negative for dizziness, weakness, light-headedness, numbness and headaches. Psychiatric/Behavioral:  Negative for agitation and confusion. Vitals:    11/06/22 1939   BP: (!) 163/62   Pulse: 70   Resp: 17   Temp: 97 °F (36.1 °C)   SpO2: 93%            Physical Exam  Constitutional:       General: She is not in acute distress. Appearance: She is well-developed. HENT:      Head: Normocephalic. Comments: +R parietal scalp contusion  Eyes:      General: No scleral icterus. Pupils: Pupils are equal, round, and reactive to light. Neck:      Trachea: No tracheal deviation. Cardiovascular:      Rate and Rhythm: Normal rate and regular rhythm. Heart sounds: No murmur heard. No friction rub. No gallop. Pulmonary:      Effort: Pulmonary effort is normal. No respiratory distress.       Breath sounds: Normal breath sounds. No wheezing or rales. Abdominal:      General: Bowel sounds are normal. There is no distension. Palpations: Abdomen is soft. Tenderness: There is no abdominal tenderness. Musculoskeletal:         General: No deformity. Cervical back: Neck supple. Skin:     General: Skin is warm and dry. Neurological:      Mental Status: She is alert and oriented to person, place, and time. Cranial Nerves: No cranial nerve deficit. Sensory: No sensory deficit. Motor: No weakness. Psychiatric:         Behavior: Behavior normal.        MDM  Number of Diagnoses or Management Options  Fall from ground level  Diagnosis management comments: 53-year-old female presenting to the ED following ground-level fall. Differential includes intracranial bleeding, concussion, contusion. Head CT negative. Patient discharged with anticipatory guidance and return precautions. Amount and/or Complexity of Data Reviewed  Tests in the radiology section of CPT®: ordered and reviewed           Procedures    DISCHARGE NOTE:  The patient has been re-evaluated and feeling much better and are stable for discharge. All available radiology and laboratory results have been reviewed with patient and/or available family. Patient and/or family verbally conveyed their understanding and agreement of the patient's signs, symptoms, diagnosis, treatment and prognosis and additionally agree to follow-up as recommended in the discharge instructions or to return to the Emergency Department should their condition change or worsen prior to their follow-up appointment. All questions have been answered and patient and/or available family express understanding. LABORATORY RESULTS:  No results found for this or any previous visit (from the past 24 hour(s)). IMAGING RESULTS:  CT HEAD WO CONT    Result Date: 11/6/2022  1. No evidence of acute intracranial abnormality.       MEDICATIONS GIVEN:  Medications - No data to display    IMPRESSION:  1.  Fall from ground level        PLAN:  Follow-up Information       Follow up With Specialties Details Why Contact Info    Ronald Acosta MD Internal Medicine Physician In 1 week As needed [address] [place] [address]             Discharge Medication List as of 11/6/2022 10:03 PM          Signed By: Pedro Pelletier MD     November 7, 2022

## 2022-11-21 LAB — HBA1C MFR BLD HPLC: 8.2 %

## 2023-01-16 ENCOUNTER — TRANSCRIBE ORDER (OUTPATIENT)
Dept: SCHEDULING | Age: 83
End: 2023-01-16

## 2023-01-16 DIAGNOSIS — L40.3 SAPHO SYNDROME (HCC): Primary | ICD-10-CM

## 2023-01-16 DIAGNOSIS — M65.9 SAPHO SYNDROME (HCC): Primary | ICD-10-CM

## 2023-01-16 DIAGNOSIS — L70.9 SAPHO SYNDROME (HCC): Primary | ICD-10-CM

## 2023-01-16 DIAGNOSIS — M85.80 SAPHO SYNDROME (HCC): Primary | ICD-10-CM

## 2023-01-16 DIAGNOSIS — Z13.820 SPECIAL SCREENING FOR OSTEOPOROSIS: ICD-10-CM

## 2023-01-16 DIAGNOSIS — M81.0 SENILE OSTEOPOROSIS: ICD-10-CM

## 2023-01-16 DIAGNOSIS — M86.9 SAPHO SYNDROME (HCC): Primary | ICD-10-CM

## 2023-02-18 NOTE — PROGRESS NOTES
PT DAILY TREATMENT NOTE - [facility] 2-15 Patient Name: Lourdes Ruiz Date:2018 : 1940 [x]  Patient  Verified Payor: [place] MEDICARE / Plan: Ludin Payan / Product Type: Medicare / In time: 930 AM  Out time: 1040 AM 
Total Treatment Time (min): 70 Total Timed Codes (min): 60 
1:1 Treatment Time ([facility] only): 40 Visit #: 3 Treatment Area: Hip pain [M25.559] SUBJECTIVE Pain Level (0-10 scale): 6/10 Any medication changes, allergies to medications, adverse drug reactions, diagnosis change, or new procedure performed?: [x] No    [] Yes (see summary sheet for update) Subjective functional status/changes:   [] No changes reported Pt states she felt good yesterday, started having some inc'd hip pain last night OBJECTIVE Modality rationale: decrease pain to improve the patients ability to perform ADL's, walk, stand with dec'd symptoms Min Type Additional Details  
 
 [] Estim: []Att   []Unatt    []TENS instruct []IFC  []Premod   []NMES []Other:  []w/US   []w/ice   []w/heat Position: Location:  
 
 []  Traction: [] Cervical       []Lumbar 
                     [] Prone          []Supine []Intermittent   []Continuous Lbs: 
[] before manual 
[] after manual 
[]w/heat  
 []  Ultrasound: []Continuous   [] Pulsed  
                    at: []1MHz   []3MHz Location: 
W/cm2:  
 [] Paraffin Location:  
[]w/heat  
10 [x]  Ice     []  Heat 
[]  Ice massage Position: sidelying on R Location: L hip  
 []  Laser 
[]  Other: Position: Location:  
 
 []  Vasopneumatic Device Pressure:       [] lo [] med [] hi  
Temperature:   
 
[x] Skin assessment post-treatment:  [x]intact []redness- no adverse reaction 
  []redness  adverse reaction:  
 
52 min Therapeutic Exercise:  [x] See flow sheet : reviewed HEP; added per flow sheet Rationale: increase ROM, increase strength, improve coordination, improve balance and increase proprioception to improve the patients ability to exercise, walk, stand with dec'd symptoms 8 min Manual Therapy:  Long axis hip distraction on L 
STM L glute max/med Rationale: decrease pain, increase tissue extensibility and decrease trigger points to improve the patients ability to perform daily chores with dec'd symptoms With 
 [] TE 
 [] TA 
 [] neuro 
 [] other: Patient Education: [x] Review HEP [] Progressed/Changed HEP based on:  
[] positioning   [] body mechanics   [] transfers   [] heat/ice application   
[] other:   
 
Other Objective/Functional Measures: Mod to severe TTP L glute max/med with light touch Pain Level (0-10 scale) post treatment: 0/10 ASSESSMENT/Changes in Function:  
Updated HEP. Pt req'd CGA to Jessica during sidestepping in //bars, retro walking, tandem walking Patient will continue to benefit from skilled PT services to modify and progress therapeutic interventions, address functional mobility deficits, address ROM deficits, address strength deficits, analyze and address soft tissue restrictions, analyze and cue movement patterns, analyze and modify body mechanics/ergonomics, assess and modify postural abnormalities and address imbalance/dizziness to attain remaining goals. []  See Plan of Care 
[]  See progress note/recertification 
[]  See Discharge Summary Progress towards goals / Updated goals: 
nt 
 
PLAN 
[]  Upgrade activities as tolerated     [x]  Continue plan of care 
[]  Update interventions per flow sheet      
[]  Discharge due to:_ 
[]  Other:_   
 
Yani Mahajan, PT 11/1/2018  9:55 AM 
 
 Clear

## 2023-03-08 ENCOUNTER — TRANSCRIBE ORDER (OUTPATIENT)
Dept: SCHEDULING | Age: 83
End: 2023-03-08

## 2023-03-08 DIAGNOSIS — R41.3 OTHER AMNESIA: Primary | ICD-10-CM

## 2023-03-08 DIAGNOSIS — Z12.39 ENCOUNTER FOR OTHER SCREENING FOR MALIGNANT NEOPLASM OF BREAST: Primary | ICD-10-CM

## 2023-03-08 DIAGNOSIS — Z12.31 OTHER SCREENING MAMMOGRAM: ICD-10-CM

## 2023-03-27 ENCOUNTER — HOSPITAL ENCOUNTER (OUTPATIENT)
Dept: MAMMOGRAPHY | Age: 83
Discharge: HOME | End: 2023-03-27
Payer: MEDICARE

## 2023-03-27 DIAGNOSIS — Z12.39 ENCOUNTER FOR OTHER SCREENING FOR MALIGNANT NEOPLASM OF BREAST: ICD-10-CM

## 2023-03-27 DIAGNOSIS — Z12.31 OTHER SCREENING MAMMOGRAM: ICD-10-CM

## 2023-03-27 PROCEDURE — 77063 BREAST TOMOSYNTHESIS BI: CPT

## 2023-04-21 DIAGNOSIS — Z12.31 ENCOUNTER FOR SCREENING MAMMOGRAM FOR MALIGNANT NEOPLASM OF BREAST: Primary | ICD-10-CM

## 2023-04-22 DIAGNOSIS — Z13.820 SPECIAL SCREENING FOR OSTEOPOROSIS: ICD-10-CM

## 2023-04-22 DIAGNOSIS — M81.0 SENILE OSTEOPOROSIS: ICD-10-CM

## 2023-04-22 DIAGNOSIS — L70.9 SAPHO SYNDROME (HCC): Primary | ICD-10-CM

## 2023-04-22 DIAGNOSIS — M85.80 SAPHO SYNDROME (HCC): Primary | ICD-10-CM

## 2023-04-22 DIAGNOSIS — M65.9 SAPHO SYNDROME (HCC): Primary | ICD-10-CM

## 2023-04-22 DIAGNOSIS — M86.9 SAPHO SYNDROME (HCC): Primary | ICD-10-CM

## 2023-04-22 DIAGNOSIS — L40.3 SAPHO SYNDROME (HCC): Primary | ICD-10-CM

## 2023-04-22 DIAGNOSIS — R41.3 OTHER AMNESIA: Primary | ICD-10-CM

## 2023-04-23 DIAGNOSIS — Z12.39 ENCOUNTER FOR OTHER SCREENING FOR MALIGNANT NEOPLASM OF BREAST: Primary | ICD-10-CM

## 2023-04-23 DIAGNOSIS — Z12.31 OTHER SCREENING MAMMOGRAM: ICD-10-CM

## 2023-08-08 LAB
HBA1C MFR BLD HPLC: 9.1 %
LDL CHOLESTEROL, EXTERNAL: 73
MICROALBUMIN/CREATININE RATIO, EXTERNAL: 89

## 2023-10-06 ENCOUNTER — HOSPITAL ENCOUNTER (INPATIENT)
Facility: HOSPITAL | Age: 83
LOS: 1 days | Discharge: HOME | End: 2023-10-07
Attending: STUDENT IN AN ORGANIZED HEALTH CARE EDUCATION/TRAINING PROGRAM | Admitting: HOSPITALIST
Payer: MEDICARE

## 2023-10-06 ENCOUNTER — APPOINTMENT (OUTPATIENT)
Facility: HOSPITAL | Age: 83
End: 2023-10-06
Payer: MEDICARE

## 2023-10-06 DIAGNOSIS — R53.83 OTHER FATIGUE: ICD-10-CM

## 2023-10-06 DIAGNOSIS — N17.9 ACUTE KIDNEY INJURY (HCC): Primary | ICD-10-CM

## 2023-10-06 DIAGNOSIS — E86.0 DEHYDRATION: ICD-10-CM

## 2023-10-06 DIAGNOSIS — R53.1 GENERALIZED WEAKNESS: ICD-10-CM

## 2023-10-06 DIAGNOSIS — E83.52 HYPERCALCEMIA: ICD-10-CM

## 2023-10-06 LAB
ALBUMIN SERPL-MCNC: 3.8 G/DL (ref 3.5–5)
ALBUMIN/GLOB SERPL: 1 (ref 1.1–2.2)
ALP SERPL-CCNC: 48 U/L (ref 45–117)
ALT SERPL-CCNC: 24 U/L (ref 12–78)
ANION GAP SERPL CALC-SCNC: 8 MMOL/L (ref 5–15)
APPEARANCE UR: CLEAR
AST SERPL-CCNC: 17 U/L (ref 15–37)
BACTERIA URNS QL MICRO: NEGATIVE /HPF
BASOPHILS # BLD: 0 K/UL (ref 0–0.1)
BASOPHILS NFR BLD: 0 % (ref 0–1)
BILIRUB SERPL-MCNC: 0.8 MG/DL (ref 0.2–1)
BILIRUB UR QL: NEGATIVE
BUN SERPL-MCNC: 48 MG/DL (ref 6–20)
BUN/CREAT SERPL: 31 (ref 12–20)
CALCIUM SERPL-MCNC: 10.8 MG/DL (ref 8.5–10.1)
CHLORIDE SERPL-SCNC: 99 MMOL/L (ref 97–108)
CO2 SERPL-SCNC: 32 MMOL/L (ref 21–32)
COLOR UR: ABNORMAL
CREAT SERPL-MCNC: 1.53 MG/DL (ref 0.55–1.02)
DIFFERENTIAL METHOD BLD: ABNORMAL
EOSINOPHIL # BLD: 0.1 K/UL (ref 0–0.4)
EOSINOPHIL NFR BLD: 1 % (ref 0–7)
EPITH CASTS URNS QL MICRO: ABNORMAL /LPF
ERYTHROCYTE [DISTWIDTH] IN BLOOD BY AUTOMATED COUNT: 12.7 % (ref 11.5–14.5)
GLOBULIN SER CALC-MCNC: 3.9 G/DL (ref 2–4)
GLUCOSE SERPL-MCNC: 343 MG/DL (ref 65–100)
GLUCOSE UR STRIP.AUTO-MCNC: >1000 MG/DL
HCT VFR BLD AUTO: 40.5 % (ref 35–47)
HGB BLD-MCNC: 13.4 G/DL (ref 11.5–16)
HGB UR QL STRIP: NEGATIVE
IMM GRANULOCYTES # BLD AUTO: 0 K/UL (ref 0–0.04)
IMM GRANULOCYTES NFR BLD AUTO: 0 % (ref 0–0.5)
KETONES UR QL STRIP.AUTO: ABNORMAL MG/DL
LEUKOCYTE ESTERASE UR QL STRIP.AUTO: NEGATIVE
LYMPHOCYTES # BLD: 1.5 K/UL (ref 0.8–3.5)
LYMPHOCYTES NFR BLD: 15 % (ref 12–49)
MAGNESIUM SERPL-MCNC: 1.9 MG/DL (ref 1.6–2.4)
MCH RBC QN AUTO: 31.2 PG (ref 26–34)
MCHC RBC AUTO-ENTMCNC: 33.1 G/DL (ref 30–36.5)
MCV RBC AUTO: 94.2 FL (ref 80–99)
MONOCYTES # BLD: 0.5 K/UL (ref 0–1)
MONOCYTES NFR BLD: 4 % (ref 5–13)
NEUTS SEG # BLD: 8.3 K/UL (ref 1.8–8)
NEUTS SEG NFR BLD: 80 % (ref 32–75)
NITRITE UR QL STRIP.AUTO: NEGATIVE
NRBC # BLD: 0 K/UL (ref 0–0.01)
NRBC BLD-RTO: 0 PER 100 WBC
PH UR STRIP: 5.5 (ref 5–8)
PLATELET # BLD AUTO: 356 K/UL (ref 150–400)
PMV BLD AUTO: 10.5 FL (ref 8.9–12.9)
POTASSIUM SERPL-SCNC: 3.7 MMOL/L (ref 3.5–5.1)
PROT SERPL-MCNC: 7.7 G/DL (ref 6.4–8.2)
PROT UR STRIP-MCNC: NEGATIVE MG/DL
RBC # BLD AUTO: 4.3 M/UL (ref 3.8–5.2)
RBC #/AREA URNS HPF: ABNORMAL /HPF (ref 0–5)
SODIUM SERPL-SCNC: 139 MMOL/L (ref 136–145)
SP GR UR REFRACTOMETRY: 1.03 (ref 1–1.03)
SPECIMEN HOLD: NORMAL
UROBILINOGEN UR QL STRIP.AUTO: 0.2 EU/DL (ref 0.2–1)
WBC # BLD AUTO: 10.4 K/UL (ref 3.6–11)
WBC URNS QL MICRO: ABNORMAL /HPF (ref 0–4)
YEAST BUDDING URNS QL: PRESENT

## 2023-10-06 PROCEDURE — 71046 X-RAY EXAM CHEST 2 VIEWS: CPT

## 2023-10-06 PROCEDURE — 36415 COLL VENOUS BLD VENIPUNCTURE: CPT

## 2023-10-06 PROCEDURE — 83735 ASSAY OF MAGNESIUM: CPT

## 2023-10-06 PROCEDURE — 80053 COMPREHEN METABOLIC PANEL: CPT

## 2023-10-06 PROCEDURE — 93005 ELECTROCARDIOGRAM TRACING: CPT

## 2023-10-06 PROCEDURE — 1100000000 HC RM PRIVATE

## 2023-10-06 PROCEDURE — 99285 EMERGENCY DEPT VISIT HI MDM: CPT

## 2023-10-06 PROCEDURE — 2580000003 HC RX 258

## 2023-10-06 PROCEDURE — 70450 CT HEAD/BRAIN W/O DYE: CPT

## 2023-10-06 PROCEDURE — 85025 COMPLETE CBC W/AUTO DIFF WBC: CPT

## 2023-10-06 PROCEDURE — 81001 URINALYSIS AUTO W/SCOPE: CPT

## 2023-10-06 RX ORDER — ASCORBIC ACID 500 MG
1 TABLET ORAL
COMMUNITY

## 2023-10-06 RX ORDER — SODIUM CHLORIDE 0.9 % (FLUSH) 0.9 %
5-40 SYRINGE (ML) INJECTION EVERY 12 HOURS SCHEDULED
Status: DISCONTINUED | OUTPATIENT
Start: 2023-10-06 | End: 2023-10-07 | Stop reason: HOSPADM

## 2023-10-06 RX ORDER — INSULIN GLARGINE 100 [IU]/ML
INJECTION, SOLUTION SUBCUTANEOUS
COMMUNITY
Start: 2023-10-05

## 2023-10-06 RX ORDER — ESCITALOPRAM OXALATE 10 MG/1
TABLET ORAL
COMMUNITY
Start: 2023-10-04

## 2023-10-06 RX ORDER — ENOXAPARIN SODIUM 100 MG/ML
30 INJECTION SUBCUTANEOUS DAILY
Status: DISCONTINUED | OUTPATIENT
Start: 2023-10-07 | End: 2023-10-07 | Stop reason: HOSPADM

## 2023-10-06 RX ORDER — ACETAMINOPHEN 325 MG/1
650 TABLET ORAL EVERY 6 HOURS PRN
Status: DISCONTINUED | OUTPATIENT
Start: 2023-10-06 | End: 2023-10-07 | Stop reason: HOSPADM

## 2023-10-06 RX ORDER — 0.9 % SODIUM CHLORIDE 0.9 %
1000 INTRAVENOUS SOLUTION INTRAVENOUS ONCE
Status: COMPLETED | OUTPATIENT
Start: 2023-10-06 | End: 2023-10-07

## 2023-10-06 RX ORDER — POLYETHYLENE GLYCOL 3350 17 G/17G
17 POWDER, FOR SOLUTION ORAL DAILY PRN
Status: DISCONTINUED | OUTPATIENT
Start: 2023-10-06 | End: 2023-10-07 | Stop reason: HOSPADM

## 2023-10-06 RX ORDER — SODIUM CHLORIDE 9 MG/ML
INJECTION, SOLUTION INTRAVENOUS PRN
Status: DISCONTINUED | OUTPATIENT
Start: 2023-10-06 | End: 2023-10-07 | Stop reason: HOSPADM

## 2023-10-06 RX ORDER — ONDANSETRON 2 MG/ML
4 INJECTION INTRAMUSCULAR; INTRAVENOUS EVERY 6 HOURS PRN
Status: DISCONTINUED | OUTPATIENT
Start: 2023-10-06 | End: 2023-10-07 | Stop reason: HOSPADM

## 2023-10-06 RX ORDER — SODIUM CHLORIDE 0.9 % (FLUSH) 0.9 %
5-40 SYRINGE (ML) INJECTION PRN
Status: DISCONTINUED | OUTPATIENT
Start: 2023-10-06 | End: 2023-10-07 | Stop reason: HOSPADM

## 2023-10-06 RX ORDER — ACETAMINOPHEN 650 MG/1
650 SUPPOSITORY RECTAL EVERY 6 HOURS PRN
Status: DISCONTINUED | OUTPATIENT
Start: 2023-10-06 | End: 2023-10-07 | Stop reason: HOSPADM

## 2023-10-06 RX ORDER — ONDANSETRON 4 MG/1
4 TABLET, ORALLY DISINTEGRATING ORAL EVERY 8 HOURS PRN
Status: DISCONTINUED | OUTPATIENT
Start: 2023-10-06 | End: 2023-10-07 | Stop reason: HOSPADM

## 2023-10-06 RX ADMIN — SODIUM CHLORIDE 1000 ML: 9 INJECTION, SOLUTION INTRAVENOUS at 13:31

## 2023-10-06 ASSESSMENT — LIFESTYLE VARIABLES
HOW MANY STANDARD DRINKS CONTAINING ALCOHOL DO YOU HAVE ON A TYPICAL DAY: PATIENT DOES NOT DRINK
HOW OFTEN DO YOU HAVE A DRINK CONTAINING ALCOHOL: NEVER

## 2023-10-06 ASSESSMENT — PAIN SCALES - GENERAL
PAINLEVEL_OUTOF10: 0

## 2023-10-06 ASSESSMENT — ENCOUNTER SYMPTOMS
COLOR CHANGE: 0
TROUBLE SWALLOWING: 0
ABDOMINAL PAIN: 0
SHORTNESS OF BREATH: 0

## 2023-10-06 NOTE — ED NOTES
Pt resting in NAD, family at bedside.  Pt denies needs at this time     Deonna Jones RN  10/06/23 [id number]

## 2023-10-06 NOTE — ED TRIAGE NOTES
Patient arrives with daughter with c/o fatigue for th last 2 weeks. Patient was seen at PCP and BS was elevated, and creatinine was 1.36. family denies fever.

## 2023-10-06 NOTE — ED PROVIDER NOTES
Abdominal:      General: Abdomen is flat. There is no distension. Palpations: Abdomen is soft. Tenderness: There is no abdominal tenderness. Musculoskeletal:         General: Normal range of motion. Right lower leg: No edema. Left lower leg: No edema. Skin:     General: Skin is dry. Neurological:      General: No focal deficit present. Mental Status: She is alert and oriented to person, place, and time. Mental status is at baseline. Motor: No weakness. DIAGNOSTIC RESULTS     EKG: All EKG's are interpreted by the Emergency Department Physician who either signs or Co-signs this chart in the absence of a cardiologist.        RADIOLOGY:   Non-plain film images such as CT, Ultrasound and MRI are read by the radiologist. Plain radiographic images are visualized and preliminarily interpreted by the emergency physician with the below findings:        Interpretation per the Radiologist below, if available at the time of this note:    CT Head W/O Contrast   Final Result   No acute intracranial abnormality. XR CHEST (2 VW)   Final Result   Clear lungs.            LABS:  Labs Reviewed   CBC WITH AUTO DIFFERENTIAL - Abnormal; Notable for the following components:       Result Value    Neutrophils % 80 (*)     Monocytes % 4 (*)     Neutrophils Absolute 8.3 (*)     All other components within normal limits   COMPREHENSIVE METABOLIC PANEL - Abnormal; Notable for the following components:    Glucose 343 (*)     BUN 48 (*)     Creatinine 1.53 (*)     Bun/Cre Ratio 31 (*)     Est, Glom Filt Rate 33 (*)     Calcium 10.8 (*)     Albumin/Globulin Ratio 1.0 (*)     All other components within normal limits   URINALYSIS WITH MICROSCOPIC - Abnormal; Notable for the following components:    Glucose, UA >1000 (*)     Ketones, Urine TRACE (*)     All other components within normal limits   URINE CULTURE HOLD SAMPLE   MAGNESIUM       All other labs were within normal range or not returned as 10/06/23 1515   Fri Oct 06, 2023   1314 ECG performed at 1311 shows sinus rhythm, sinus arrhythmia, ventricular rate of 69, normal intervals no STEMI [WG]   1314 XR CHEST (2 VW)  Clear lungs, no evidence of lobar consolidation  [TR]   1323 CT Head W/O Contrast  No acute abnormality  [TR]   1329 CBC with Auto Differential(!):    WBC 10.4   RBC 4.30   Hemoglobin Quant 13.4   Hematocrit 40.5   MCV 94.2   MCH 31.2   MCHC 33.1   RDW 12.7   Platelet Count 798   MPV 10.5   Nucleated Red Blood Cells 0.0   Nucleated Red Blood Cells 0.00   Neutrophils % 80(!)   Lymphocyte % 15   Monocytes % 4(!)   Eosinophils % 1   Basophils % 0   Immature Granulocytes 0   Neutrophils Absolute 8.3(!)   Lymphocytes Absolute 1.5   Monocytes Absolute 0.5   Eosinophils Absolute 0.1   Basophils Absolute 0.0   Absolute Immature Granulocyte 0.0   Differential Type AUTOMATED  Normal  [TR]   1339 CMP(!):    Sodium 139   Potassium 3.7   Chloride 99   CO2 32   Anion Gap 8   Glucose, Random 343(!)   BUN,BUNPL 48(!)   Creatinine 1.53(!)   Bun/Cre Ratio 31(!)   Est, Glom Filt Rate 33(!)   CALCIUM, SERUM, 402099 10.8(!)   BILIRUBIN TOTAL 0.8   ALT 24   AST 17   Alk Phos 48   Total Protein 7.7   Albumin 3.8   Globulin 3.9   ALBUMIN/GLOBULIN RATIO 1.0(!)  Acute elevation in creatinine, hypercalcemia  [TR]   1512 Urinalysis with Microscopic(!):    Color, UA YELLOW/STRAW   Appearance CLEAR   Specific Gravity, UA 1.028   pH, Urine 5.5   Protein, UA Negative   Glucose, UA >1000(!)   Ketones, Urine TRACE(!)   Bilirubin, Urine Negative   Blood, Urine Negative   Urobilinogen, Urine 0.2   Nitrite, Urine Negative   Leukocyte Esterase, Urine Negative   WBC, UA PENDING   RBC, UA PENDING   Epithelial Cells, UA PENDING   Bacteria, UA PENDING  Glucose and ketones present, no evidence of UTI  [TR]      ED Course User Index  [TR] Lucille Cockayne, PA-C  [WG] Santhosh Ibanez DO           CONSULTS:  IP CONSULT TO HOSPITALIST    PROCEDURES:  Unless otherwise noted below, none

## 2023-10-07 ENCOUNTER — APPOINTMENT (OUTPATIENT)
Facility: HOSPITAL | Age: 83
End: 2023-10-07
Payer: MEDICARE

## 2023-10-07 VITALS
TEMPERATURE: 98.1 F | OXYGEN SATURATION: 96 % | HEIGHT: 62 IN | BODY MASS INDEX: 21.53 KG/M2 | WEIGHT: 117 LBS | HEART RATE: 58 BPM | DIASTOLIC BLOOD PRESSURE: 58 MMHG | RESPIRATION RATE: 18 BRPM | SYSTOLIC BLOOD PRESSURE: 112 MMHG

## 2023-10-07 PROBLEM — E86.0 DEHYDRATION: Status: ACTIVE | Noted: 2023-10-07

## 2023-10-07 LAB
ANION GAP SERPL CALC-SCNC: 9 MMOL/L (ref 5–15)
BASOPHILS # BLD: 0 K/UL (ref 0–0.1)
BASOPHILS NFR BLD: 1 % (ref 0–1)
BUN SERPL-MCNC: 37 MG/DL (ref 6–20)
BUN/CREAT SERPL: 32 (ref 12–20)
CALCIUM SERPL-MCNC: 9.4 MG/DL (ref 8.5–10.1)
CHLORIDE SERPL-SCNC: 101 MMOL/L (ref 97–108)
CO2 SERPL-SCNC: 26 MMOL/L (ref 21–32)
CREAT SERPL-MCNC: 1.17 MG/DL (ref 0.55–1.02)
DIFFERENTIAL METHOD BLD: ABNORMAL
EKG ATRIAL RATE: 69 BPM
EKG DIAGNOSIS: NORMAL
EKG P AXIS: 25 DEGREES
EKG P-R INTERVAL: 148 MS
EKG Q-T INTERVAL: 402 MS
EKG QRS DURATION: 68 MS
EKG QTC CALCULATION (BAZETT): 430 MS
EKG R AXIS: 9 DEGREES
EKG T AXIS: 53 DEGREES
EKG VENTRICULAR RATE: 69 BPM
EOSINOPHIL # BLD: 0.1 K/UL (ref 0–0.4)
EOSINOPHIL NFR BLD: 1 % (ref 0–7)
ERYTHROCYTE [DISTWIDTH] IN BLOOD BY AUTOMATED COUNT: 12.6 % (ref 11.5–14.5)
GLUCOSE BLD STRIP.AUTO-MCNC: 232 MG/DL (ref 65–117)
GLUCOSE BLD STRIP.AUTO-MCNC: 250 MG/DL (ref 65–117)
GLUCOSE SERPL-MCNC: 287 MG/DL (ref 65–100)
HCT VFR BLD AUTO: 34.8 % (ref 35–47)
HGB BLD-MCNC: 11.7 G/DL (ref 11.5–16)
IMM GRANULOCYTES # BLD AUTO: 0 K/UL (ref 0–0.04)
IMM GRANULOCYTES NFR BLD AUTO: 1 % (ref 0–0.5)
LYMPHOCYTES # BLD: 2.3 K/UL (ref 0.8–3.5)
LYMPHOCYTES NFR BLD: 26 % (ref 12–49)
MAGNESIUM SERPL-MCNC: 1.8 MG/DL (ref 1.6–2.4)
MCH RBC QN AUTO: 31.2 PG (ref 26–34)
MCHC RBC AUTO-ENTMCNC: 33.6 G/DL (ref 30–36.5)
MCV RBC AUTO: 92.8 FL (ref 80–99)
MONOCYTES # BLD: 0.4 K/UL (ref 0–1)
MONOCYTES NFR BLD: 5 % (ref 5–13)
NEUTS SEG # BLD: 6 K/UL (ref 1.8–8)
NEUTS SEG NFR BLD: 66 % (ref 32–75)
NRBC # BLD: 0 K/UL (ref 0–0.01)
NRBC BLD-RTO: 0 PER 100 WBC
PLATELET # BLD AUTO: 283 K/UL (ref 150–400)
PMV BLD AUTO: 10.1 FL (ref 8.9–12.9)
POTASSIUM SERPL-SCNC: 3.5 MMOL/L (ref 3.5–5.1)
RBC # BLD AUTO: 3.75 M/UL (ref 3.8–5.2)
SERVICE CMNT-IMP: ABNORMAL
SERVICE CMNT-IMP: ABNORMAL
SODIUM SERPL-SCNC: 136 MMOL/L (ref 136–145)
WBC # BLD AUTO: 8.9 K/UL (ref 3.6–11)

## 2023-10-07 PROCEDURE — 6370000000 HC RX 637 (ALT 250 FOR IP): Performed by: FAMILY MEDICINE

## 2023-10-07 PROCEDURE — 82962 GLUCOSE BLOOD TEST: CPT

## 2023-10-07 PROCEDURE — 36415 COLL VENOUS BLD VENIPUNCTURE: CPT

## 2023-10-07 PROCEDURE — 76770 US EXAM ABDO BACK WALL COMP: CPT

## 2023-10-07 PROCEDURE — 83735 ASSAY OF MAGNESIUM: CPT

## 2023-10-07 PROCEDURE — 85025 COMPLETE CBC W/AUTO DIFF WBC: CPT

## 2023-10-07 PROCEDURE — 97161 PT EVAL LOW COMPLEX 20 MIN: CPT

## 2023-10-07 PROCEDURE — 6360000002 HC RX W HCPCS: Performed by: FAMILY MEDICINE

## 2023-10-07 PROCEDURE — 51798 US URINE CAPACITY MEASURE: CPT

## 2023-10-07 PROCEDURE — 2580000003 HC RX 258: Performed by: FAMILY MEDICINE

## 2023-10-07 PROCEDURE — 97116 GAIT TRAINING THERAPY: CPT

## 2023-10-07 PROCEDURE — 80048 BASIC METABOLIC PNL TOTAL CA: CPT

## 2023-10-07 PROCEDURE — 93010 ELECTROCARDIOGRAM REPORT: CPT | Performed by: SPECIALIST

## 2023-10-07 RX ORDER — HYDRALAZINE HYDROCHLORIDE 20 MG/ML
10 INJECTION INTRAMUSCULAR; INTRAVENOUS EVERY 6 HOURS PRN
Status: DISCONTINUED | OUTPATIENT
Start: 2023-10-07 | End: 2023-10-07 | Stop reason: HOSPADM

## 2023-10-07 RX ORDER — INSULIN LISPRO 100 [IU]/ML
0-4 INJECTION, SOLUTION INTRAVENOUS; SUBCUTANEOUS NIGHTLY
Status: DISCONTINUED | OUTPATIENT
Start: 2023-10-07 | End: 2023-10-07 | Stop reason: HOSPADM

## 2023-10-07 RX ORDER — DEXTROSE MONOHYDRATE 100 MG/ML
INJECTION, SOLUTION INTRAVENOUS CONTINUOUS PRN
Status: DISCONTINUED | OUTPATIENT
Start: 2023-10-07 | End: 2023-10-07 | Stop reason: HOSPADM

## 2023-10-07 RX ORDER — PAROXETINE HYDROCHLORIDE 20 MG/1
40 TABLET, FILM COATED ORAL DAILY
Status: DISCONTINUED | OUTPATIENT
Start: 2023-10-07 | End: 2023-10-07 | Stop reason: HOSPADM

## 2023-10-07 RX ORDER — ROSUVASTATIN CALCIUM 10 MG/1
5 TABLET, COATED ORAL NIGHTLY
Status: DISCONTINUED | OUTPATIENT
Start: 2023-10-07 | End: 2023-10-07 | Stop reason: HOSPADM

## 2023-10-07 RX ORDER — INSULIN LISPRO 100 [IU]/ML
0-4 INJECTION, SOLUTION INTRAVENOUS; SUBCUTANEOUS
Status: DISCONTINUED | OUTPATIENT
Start: 2023-10-07 | End: 2023-10-07 | Stop reason: HOSPADM

## 2023-10-07 RX ADMIN — VERAPAMIL HYDROCHLORIDE 180 MG: 180 TABLET, FILM COATED, EXTENDED RELEASE ORAL at 11:48

## 2023-10-07 RX ADMIN — Medication 1 UNITS: at 11:55

## 2023-10-07 RX ADMIN — SODIUM CHLORIDE: 9 INJECTION, SOLUTION INTRAVENOUS at 00:00

## 2023-10-07 RX ADMIN — ENOXAPARIN SODIUM 30 MG: 100 INJECTION SUBCUTANEOUS at 10:19

## 2023-10-07 RX ADMIN — SODIUM CHLORIDE, PRESERVATIVE FREE 10 ML: 5 INJECTION INTRAVENOUS at 11:48

## 2023-10-07 RX ADMIN — Medication 2 UNITS: at 08:05

## 2023-10-07 RX ADMIN — PAROXETINE HYDROCHLORIDE 40 MG: 20 TABLET, FILM COATED ORAL at 10:19

## 2023-10-07 ASSESSMENT — PAIN SCALES - GENERAL: PAINLEVEL_OUTOF10: 0

## 2023-10-07 NOTE — PLAN OF CARE
Problem: Safety - Adult  Goal: Free from fall injury  10/6/2023 2337 by Akash Jimenez LPN  Outcome: Progressing     Problem: Skin/Tissue Integrity  Goal: Absence of new skin breakdown  Description: 1. Monitor for areas of redness and/or skin breakdown  2. Assess vascular access sites hourly  3. Every 4-6 hours minimum:  Change oxygen saturation probe site  4. Every 4-6 hours:  If on nasal continuous positive airway pressure, respiratory therapy assess nares and determine need for appliance change or resting period.   Outcome: Progressing

## 2023-10-07 NOTE — H&P
History and Physical    Date of Service:  10/6/2023  Primary Care Provider: Mercedes Flores MD  Source of information: patient, electronic medical record    Chief Complaint: Fatigue      History of Presenting Illness:   Violette Closs is a 78 y.o. female with a pmhx DM II, HtN, and dyslipidemia who presents with fatigue, and is being admitted for  elevated creatinine due to suspected dehydration. History obtained from patient, and chart review. Patient states that she was in [place], and developed bronchitis for which she was treated with antibiotics. Since return, she has been fatigued, and weak. She has had a decreased appetite with decreased p.o. intake and weight loss of 13 pounds over the past month. Her physical therapy team has also noted change in her gait with concerns for parkinsonian type illness. In the ED, VSS. Labs showed glucose 343, BUN 48, creatinine 1.53, UA with glucosuria and yeast       REVIEW OF SYSTEMS:  A comprehensive review of systems was negative except for that written in the History of Present Illness. Past Medical History:   Diagnosis Date    Diabetes ([address]) 2002    Diverticulitis of colon     Hypercholesterolemia     Hypertension     Osteopenia       Past Surgical History:   Procedure Laterality Date    CHOLECYSTECTOMY      COLONOSCOPY  2012    small polyp    GI  2012    EGD gastric polyp    HEENT  5/2003    cat. ext. O.S. - bilateral     Prior to Admission medications    Medication Sig Start Date End Date Taking?  Authorizing Provider   Multiple Vitamin (MULTI-DAY) TABS Take 1 tablet by mouth   Yes Gigi Ordaz MD   LANDENISUS SOLOSTAR 100 UNIT/ML injection pen  10/5/23   Gigi Ordaz MD   escitalopram (LEXAPRO) 10 MG tablet  10/4/23   Gigi Ordaz MD   ibuprofen (ADVIL;MOTRIN) 200 MG tablet Take 1 tablet by mouth every 6 hours as needed    Automatic Reconciliation, Ar   PARoxetine (PAXIL) 20 MG tablet Take 2 tablets by Procedure Component Value Units Date/Time    Urine Culture Hold Sample [[id number]] Collected: 10/06/23 1433    Order Status: Completed Specimen: Urine Updated: 10/06/23 1451     Specimen HOld       Urine on hold in Microbiology dept for 2 days. If unpreserved urine is submitted, it cannot be used for addtional testing after 24 hours, recollection will be required. IMAGING:   CT Head W/O Contrast   Final Result   No acute intracranial abnormality. XR CHEST (2 VW)   Final Result   Clear lungs. ECG/ECHO:    Encounter Date: 10/06/23   EKG 12 Lead   Result Value    Ventricular Rate 69    Atrial Rate 69    P-R Interval 148    QRS Duration 68    Q-T Interval 402    QTc Calculation (Bazett) 430    P [place] 25    R Axis 9    T Axis 53    Diagnosis      Normal sinus rhythm with sinus arrhythmia  Normal ECG  When compared with ECG of 04-MAR-2017 12:41,  No significant change was found       No results found for this or any previous visit. Notes reviewed from all clinical/nonclinical/nursing services involved in patient's clinical care. Care coordination discussions were held with appropriate clinical/nonclinical/ nursing providers based on care coordination needs. Assessment:   Given the patient's current clinical presentation, there is a high level of concern for decompensation if discharged from the emergency department. Complex decision making was performed, which includes reviewing the patient's available past medical records, laboratory results, and imaging studies. Principal Problem:    CONCHA (acute kidney injury) ([address])  Resolved Problems:    * No resolved hospital problems. *      Plan:   #CONCHA  -Continue to 1.53, baseline unknown  - Unclear if this is CONCHA, or CKD due to underlying hypertension/diabetes  -Avoid renal toxins, and monitor. Renally dose meds.   - Retroperitoneal ultrasound and urine studies  -Hold home Maxide, lisinopril, and Janumet    #Fatigue  #weakness  #weight loss  -check TSH, and lipase  -check CT chest, abdomen, pelvis to evaluate for occult pathology in the setting of unexplained weight loss, and constitutional sx  -PT/OT      #HTN  -prn hydralazine  -continue home verapamil    #DM II with hyperglycemia  #dyslipidemia  -Hold home Janumet, Jardiance  -continue statin  -ISS    #Depression  - Continue home Paxil      DIET: No diet orders on file   ISOLATION PRECAUTIONS: No active isolations  CODE STATUS: Full   DVT PROPHYLAXIS: Lovenox  ANTICIPATED DISCHARGE: 2-3 days  ANTICIPATED DISPOSITION: Home  EMERGENCY CONTACT/SURROGATE DECISION MAKER: daughter, I called patient's daughter per patient request at [phone], and LVOVM that we will communicate with her  when able. Signed By: Krystin Moreno MD     October 6, 2023         Please note that this dictation may have been completed with Dragon, the nScaled voice recognition software. Quite often unanticipated grammatical, syntax, homophones, and other interpretive errors are inadvertently transcribed by the computer software. Please disregard these errors. Please excuse any errors that have escaped final proofreading.

## 2023-10-07 NOTE — DISCHARGE SUMMARY
Discharge Summary       PATIENT ID: Violette Closs  MRN: [id number]   YOB: 1940    DATE OF ADMISSION: 10/6/2023 12:23 PM    DATE OF DISCHARGE: 10/7/2023   PRIMARY CARE PROVIDER: Meche Greene MD     ATTENDING PHYSICIAN: Dr. Katheryn Bourgeois  DISCHARGING PROVIDER: QUINAA Naylor NP    To contact this individual call [phone] and ask the  to page. If unavailable ask to be transferred the Adult Hospitalist Department. CONSULTATIONS: IP CONSULT TO HOSPITALIST    PROCEDURES/SURGERIES: * No surgery found *     [address] COURSE:   10/6/2023 78 y.o. female with a pmhx DM II, HtN, and dyslipidemia who presents with fatigue, and is being admitted for  elevated creatinine due to suspected dehydration. History obtained from patient, and chart review. Patient states that she was in [place], and developed bronchitis for which she was treated with antibiotics. Since return, she has been fatigued, and weak. She has had a decreased appetite with decreased p.o. intake and weight loss of 13 pounds over the past month. Her physical therapy team has also noted change in her gait with concerns for parkinsonian type illness. In the ED, VSS. Labs    10/7/2023  Pt voices feeling so much better over night. Pt informed that she has not been eating well, admitting eating a lot of sweet food while traveling. We discussed about importance of following diabetic diet and taking care of blood glucose. We also adjusted her antihypertensive agents. She was taking maxide, lisinopril, and verapamil. We discontinued Maxide and lisinopril during this hospitalization. Pt's /58 with verapamil alone. Pt has no respiratory, cardiac, or abdominal distress during visit. Pt and pt's daughter requested not to proceed with CT of chest/abd/pel. We discussed about weight loss which has been fluctuating. Pt returned from [place] and [place] not too long ago.  Pt's daughter informed me that pt would be following up with her pcp in 1-2 weeks then discuss about further work up if need. Renal US was within normal range. Pt will be following up with her pcp upon discharge. Translation was done by pt's daughter. Pt is medically stable to discharge to home at this time. DISCHARGE DIAGNOSES / PLAN:      CONCHA  - resolving; Continue to 1.53-> 1.17    U/A (-)    Renal US; within normal range    Received IVF during this admission. Fatigue  Weakness  - improved. Pt was able to ambulate hallway without any assistance    weight loss  - canceled CT chest, abdomen, pelvis after discussion with the pt and pt's daughter. Pt returned from [place] and [place] not too long ago. Pt's daughter informed me that pt would be following up with her pcp in 1-2 weeks then discuss about further work up if need. HTN  - d/c maxide and lisinopril at this time    continue with verapamil; 112/58        DM II with hyperglycemia  - the most recent A1C 9.4 prior to her trip per pt's daugther. U/A revealed >1000 glucose and trace of ketone    Discussed about importance of following diabetic diet and blood glucose control.     Continue with Jardiance and Lantus, and follow up with pcp      dyslipidemia  - continue with crestor     Depression  - Continue with Paxil             PENDING TEST RESULTS:   At the time of discharge the following test results are still pending: none  FOLLOW UP APPOINTMENTS:    Follow-up Information       Follow up With Specialties Details Why Contact Info    Paula Baca MD Family Medicine Follow up  [address] [place] [address]  [phone]                ADDITIONAL CARE RECOMMENDATIONS:   Please stop taking ramipril (Altace), ibuprofen, maxzide upon discharge    DIET: diabetic diet    ACTIVITY: activity as tolerated    WOUND CARE: none    EQUIPMENT needed: none      DISCHARGE MEDICATIONS:     Medication List

## 2023-10-07 NOTE — PLAN OF CARE
Problem: Safety - Adult  Goal: Free from fall injury  10/7/2023 1311 by José Miguel Galvez LPN  Outcome: Progressing  10/6/2023 2337 by Nancy Gonzalez LPN  Outcome: Progressing  10/6/2023 2336 by Nancy Gonzalez LPN  Outcome: Progressing     Problem: Skin/Tissue Integrity  Goal: Absence of new skin breakdown  Description: 1. Monitor for areas of redness and/or skin breakdown  2. Assess vascular access sites hourly  3. Every 4-6 hours minimum:  Change oxygen saturation probe site  4. Every 4-6 hours:  If on nasal continuous positive airway pressure, respiratory therapy assess nares and determine need for appliance change or resting period.   10/7/2023 1311 by José Miguel Galvez LPN  Outcome: Progressing  10/6/2023 2337 by Nancy Gonzalez LPN  Outcome: Progressing     Problem: Metabolic/Fluid and Electrolytes - Adult  Goal: Electrolytes maintained within normal limits  Outcome: Progressing  Goal: Hemodynamic stability and optimal renal function maintained  Outcome: Progressing  Goal: Glucose maintained within prescribed range  Outcome: Progressing     Problem: Discharge Planning  Goal: Discharge to home or other facility with appropriate resources  Outcome: Progressing

## 2023-10-07 NOTE — DISCHARGE INSTRUCTIONS
Discharge Instructions       PATIENT ID: Zaida Sharma  MRN: [id number]   YOB: 1940    DATE OF ADMISSION: 10/6/2023   DATE OF DISCHARGE: 10/7/2023    PRIMARY CARE PROVIDER: Demetria Monroe Mai     ATTENDING PHYSICIAN: Anette Appiah MD   DISCHARGING PROVIDER: QUIANA Smith NP    To contact this individual call [phone] and ask the  to page. If unavailable ask to be transferred the Adult Hospitalist Department. DISCHARGE DIAGNOSES   Acute renal failure due to dehydration which has been resolved  Fatigue, weakness; resolving. Pt was able to ambulate hallway without much assistance  Weight loss; please continue to follow up with primary care provider   Hypertension  - please stop taking maxide and lisinopril    Continue with verapamil  Type II DM  - resume Janumet and insulin therapy as at home    Follow up with your primary care provider    Fatigue and weakness due to dehydration and poor blood glucose control; feeling much better after IV hydration. CONSULTATIONS: PT/OT    PROCEDURES/SURGERIES: * No surgery found *    PENDING TEST RESULTS:   At the time of discharge the following test results are still pending: none    FOLLOW UP APPOINTMENTS:   Please continue to follow up with your primary care provider within 1-2 weeks    ADDITIONAL CARE RECOMMENDATIONS: none    DIET: diabetic diet      ACTIVITY: activity as tolerated    WOUND CARE: none    EQUIPMENT needed: none      DISCHARGE MEDICATIONS:   See Medication Reconciliation Form    It is important that you take the medication exactly as they are prescribed. Keep your medication in the bottles provided by the pharmacist and keep a list of the medication names, dosages, and times to be taken in your wallet. Do not take other medications without consulting your doctor. NOTIFY YOUR PHYSICIAN FOR ANY OF THE FOLLOWING:   Fever over 101 degrees for 24 hours.    Chest pain, shortness of breath, fever,

## 2023-10-13 NOTE — PROGRESS NOTES
Physician Progress Note      Beatrice Barrera  [facility] #:                  [id number]  :                       1940  ADMIT DATE:       10/6/2023 12:23 PM  [address] [place] [address] DATE:        10/7/2023 2:10 PM  RESPONDING  PROVIDER #:        Vanessa Harmon NP          QUERY TEXT:    Patient admitted for elevated creatinine due to suspected dehydration. The   Hospitalist noted acute kidney injury. The pt had a serum creatinine of 1.53   at admission which decreased to 1. 17. The Hospitalist noted creatinine   baseline unknown. The pt received IVF during admission and was discharged   after one night. In order to support the diagnosis of acute kidney injury,   please include additional clinical indicators in your documentation. ?Or please   document if the diagnosis of acute kidney injury has been ruled out after   further study. The medical record reflects the following:    Risk Factors: 78 y.o. female with a PMH of DM II, HTN, and dyslipidemia who   presents with fatigue, and is being admitted for elevated creatinine due to   suspected dehydration  Clinical Indicators: sCr = 1.53, 1.17 -- Hospitalist documented: \"CONCHA -   Continue to 1.53, baseline unknown - Unclear if this is CONCHA, or CKD due to   underlying hypertension/diabetes\" -- D/C summary documented: \"CONCHA - resolving;   Continue to 1.53-> 1.17 - U/A (-) - Renal US; within normal range\"  Treatment: IVF, avoid renal toxins, serial lab monitoring of renal function,   hold home Maxide/lisinopril/Janumet, renal US    Thank-you,  Cliff Luis RN, CCDS, [facility]  Clinical   Cornelious Kussmaul. [email or web address]  [phone]    Defined by Kidney Disease Improving Global Outcomes (KDIGO) clinical practice   guideline for acute kidney injury:  -Increase in SCr by greater than or equal to 0.3 mg/dl within 48 hours; or  -Increase or decrease in SCr to greater than or equal to 1.5 times baseline,   which is known or presumed to have occurred within the

## 2023-10-13 NOTE — PROGRESS NOTES
Physician Progress Note      Gypsy Roberts  CSN #:                  [id number]  :                       1940  ADMIT DATE:       10/6/2023 12:23 PM  [address] [place] [address] DATE:        10/7/2023 2:10 PM  RESPONDING  PROVIDER #:        Andi James NP          QUERY TEXT:    ELMO Tucker Cliffkyleigh,  Thank you for your response noting the acute kidney injury etiology being   dehydration. If possible, please provide additional clinical indicators to   support the diagnosis of acute kidney injury or document if acute kidney   injury was ruled out after study. The pt had a sCr of 1.53 at admission which   was less than 1.5x the lowest sCr of 1.17 that was recorded during admission,   and the Hospitalist noted an unknown sCr baseline. The pt received IVF during   admission and was discharged after one night. The medical record reflects the following:    Risk Factors: 78 y.o. female with a PMH of DM II, HTN, and dyslipidemia who   presents with fatigue, and is being admitted for elevated creatinine due to   suspected dehydration  Clinical Indicators: sCr = 1.53, 1.17 -- Hospitalist documented: \"CONCHA -   Continue to 1.53, baseline unknown - Unclear if this is CONCHA, or CKD due to   underlying hypertension/diabetes\" -- D/C summary documented: \"CONCHA - resolving;   Continue to 1.53-> 1.17 - U/A (-) - Renal US; within normal range\"  Treatment: IVF, avoid renal toxins, serial lab monitoring of renal function,   hold home Maxide/lisinopril/Janumet, renal US    Thank-you,  Live Forrest RN, CCDS, [facility]  Clinical   Catarino Hair. [email or web address]  [phone]    Defined by Kidney Disease Improving Global Outcomes (KDIGO) clinical practice   guideline for acute kidney injury:  -Increase in SCr by greater than or equal to 0.3 mg/dl within 48 hours; or  -Increase or decrease in SCr to greater than or equal to 1.5 times baseline,   which is known or presumed to have occurred within the prior 7 days; or  -Urine volume < 0.5ml/kg/h for 6 hours. Options provided:  -- Acute kidney injury evidenced by, please document additional evidence,   Please document additional evidence as well as a numerical baseline   creatinine, if known.   -- Acute kidney injury ruled out after study  -- Other - I will add my own diagnosis  -- Disagree - Not applicable / Not valid  -- Disagree - Clinically unable to determine / Unknown  -- Refer to Clinical Documentation Reviewer    PROVIDER RESPONSE TEXT:    CONCHA due to dehydration; improving upon discharge    Query created by: Luann Vázquez on 10/13/2023 8:14 AM      Electronically signed by:  Catracho Alvarado NP 10/13/2023 8:27 AM

## 2023-11-06 PROBLEM — E86.0 DEHYDRATION: Status: RESOLVED | Noted: 2023-10-07 | Resolved: 2023-11-06

## 2023-11-13 ENCOUNTER — HOSPITAL ENCOUNTER (OUTPATIENT)
Age: 83
Discharge: HOME | End: 2023-11-16
Payer: MEDICARE

## 2023-11-13 DIAGNOSIS — R41.89 COGNITIVE IMPAIRMENT: ICD-10-CM

## 2023-11-13 PROCEDURE — 70551 MRI BRAIN STEM W/O DYE: CPT

## 2023-12-13 ENCOUNTER — OFFICE VISIT (OUTPATIENT)
Age: 83
End: 2023-12-13
Payer: MEDICARE

## 2023-12-13 VITALS
DIASTOLIC BLOOD PRESSURE: 70 MMHG | BODY MASS INDEX: 22.45 KG/M2 | HEIGHT: 62 IN | SYSTOLIC BLOOD PRESSURE: 138 MMHG | WEIGHT: 122 LBS | HEART RATE: 84 BPM

## 2023-12-13 DIAGNOSIS — E78.5 HYPERLIPIDEMIA LDL GOAL <100: ICD-10-CM

## 2023-12-13 DIAGNOSIS — Z79.4 TYPE 2 DIABETES MELLITUS WITHOUT COMPLICATION, WITH LONG-TERM CURRENT USE OF INSULIN (HCC): Primary | ICD-10-CM

## 2023-12-13 DIAGNOSIS — E11.9 TYPE 2 DIABETES MELLITUS WITHOUT COMPLICATION, WITH LONG-TERM CURRENT USE OF INSULIN (HCC): Primary | ICD-10-CM

## 2023-12-13 DIAGNOSIS — I10 ESSENTIAL (PRIMARY) HYPERTENSION: ICD-10-CM

## 2023-12-13 PROCEDURE — G8484 FLU IMMUNIZE NO ADMIN: HCPCS | Performed by: INTERNAL MEDICINE

## 2023-12-13 PROCEDURE — G8427 DOCREV CUR MEDS BY ELIG CLIN: HCPCS | Performed by: INTERNAL MEDICINE

## 2023-12-13 PROCEDURE — 1123F ACP DISCUSS/DSCN MKR DOCD: CPT | Performed by: INTERNAL MEDICINE

## 2023-12-13 PROCEDURE — 3075F SYST BP GE 130 - 139MM HG: CPT | Performed by: INTERNAL MEDICINE

## 2023-12-13 PROCEDURE — G8400 PT W/DXA NO RESULTS DOC: HCPCS | Performed by: INTERNAL MEDICINE

## 2023-12-13 PROCEDURE — 1036F TOBACCO NON-USER: CPT | Performed by: INTERNAL MEDICINE

## 2023-12-13 PROCEDURE — 99215 OFFICE O/P EST HI 40 MIN: CPT | Performed by: INTERNAL MEDICINE

## 2023-12-13 PROCEDURE — 3078F DIAST BP <80 MM HG: CPT | Performed by: INTERNAL MEDICINE

## 2023-12-13 PROCEDURE — G8420 CALC BMI NORM PARAMETERS: HCPCS | Performed by: INTERNAL MEDICINE

## 2023-12-13 PROCEDURE — 1090F PRES/ABSN URINE INCON ASSESS: CPT | Performed by: INTERNAL MEDICINE

## 2023-12-13 RX ORDER — LANOLIN ALCOHOL/MO/W.PET/CERES
1000 CREAM (GRAM) TOPICAL DAILY
COMMUNITY
Start: 2023-11-20

## 2023-12-13 RX ORDER — TRIAMTERENE AND HYDROCHLOROTHIAZIDE 37.5; 25 MG/1; MG/1
1 TABLET ORAL DAILY
COMMUNITY
Start: 2023-12-12 | End: 2023-12-13

## 2023-12-13 RX ORDER — DILTIAZEM HYDROCHLORIDE 240 MG/1
240 CAPSULE, COATED, EXTENDED RELEASE ORAL DAILY
Qty: 90 CAPSULE | Refills: 3 | Status: SHIPPED | OUTPATIENT
Start: 2023-12-13

## 2023-12-13 RX ORDER — RAMIPRIL 10 MG/1
10 CAPSULE ORAL DAILY
COMMUNITY
Start: 2023-12-12

## 2023-12-13 RX ORDER — PAROXETINE HYDROCHLORIDE 40 MG/1
40 TABLET, FILM COATED ORAL EVERY MORNING
COMMUNITY
Start: 2023-10-25

## 2023-12-13 NOTE — PATIENT INSTRUCTIONS
1) Tonight take 8 units of lantus and then tomorrow morning take 8 units of lantus and tomorrow night take no insulin and then on Friday morning start taking 16 units in the morning. 2) Increase the metformin to 2 tabs in the morning and 1 tab at night. 3) Stop the triamterene/hctz and instead increase the diltiazem to 240 mg and take 1 cap in the morning. Stay on ramipril 10 mg at bedtime.

## 2023-12-13 NOTE — PROGRESS NOTES
Chief Complaint   Patient presents with    Diabetes     History of Present Illness: Luis Alberto Iqbal is a 78 y.o. female here for follow up of diabetes. Weight down 6 lbs since last visit in 8/22. Since last visit, Dr. Michelle Vegas closed her practice and is now under the care of Dr. Bora Carlos. Her daughter, Giancarlo Campa, is helping with translation. She went to the [facility] and then [place] over the past few months and since then has had more confusion and is falling more. Has been off the glipizide and janumet and at one point had been on jardiance but this was stopped. Currently is taking metformin 500 mg bid and lantus 16 units at bedtime. Was able to get freestyle maggie about 2 months ago and review of her data shows that she tends to have good fasting sugars in the 100-150 range but tends to spike up over 200 easily during the day so we agreed to move the lantus to the morning and increase his metformin in the morning to help with spikes during the day. Did have a hospital stay for 24 hours in 10/23 for dehydration and was treated with IV fluids. Her family has been taking her for IV hydration twice at [facility] in [facility] and last was on 12/5/23. Just had triamterene/hctz added back yesterday as it has been held after her hospital stay but her BP has been running higher the past 2 weeks in the 170s.   Paresh Carpenter is concerned about her risk of dehydration and frequent urination on the diuretic so we agreed to stop this and increase her calcium channel blocker instead to 240 mg.        Current Outpatient Medications   Medication Sig    vitamin B-12 (CYANOCOBALAMIN) 1000 MCG tablet Take 1 tablet by mouth daily    metFORMIN (GLUCOPHAGE) 500 MG tablet Take 1 tablet by mouth daily (with breakfast)    ramipril (ALTACE) 10 MG capsule Take 1 capsule by mouth daily    triamterene-hydroCHLOROthiazide (MAXZIDE-25) 37.5-25 MG per tablet Take 1 tablet by mouth daily    PARoxetine (PAXIL) 40 MG tablet Take 1

## 2023-12-14 ENCOUNTER — HOSPITAL ENCOUNTER (EMERGENCY)
Facility: HOSPITAL | Age: 83
Discharge: HOME | End: 2023-12-14
Attending: STUDENT IN AN ORGANIZED HEALTH CARE EDUCATION/TRAINING PROGRAM
Payer: MEDICARE

## 2023-12-14 ENCOUNTER — APPOINTMENT (OUTPATIENT)
Facility: HOSPITAL | Age: 83
End: 2023-12-14
Payer: MEDICARE

## 2023-12-14 VITALS
WEIGHT: 125.88 LBS | BODY MASS INDEX: 23.77 KG/M2 | TEMPERATURE: 99.1 F | SYSTOLIC BLOOD PRESSURE: 161 MMHG | RESPIRATION RATE: 16 BRPM | DIASTOLIC BLOOD PRESSURE: 80 MMHG | HEART RATE: 76 BPM | OXYGEN SATURATION: 98 % | HEIGHT: 61 IN
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
## 2023-12-14 DIAGNOSIS — F01.50 VASCULAR DEMENTIA, UNSPECIFIED DEMENTIA SEVERITY, UNSPECIFIED WHETHER BEHAVIORAL, PSYCHOTIC, OR MOOD DISTURBANCE OR ANXIETY (HCC): ICD-10-CM

## 2023-12-14 DIAGNOSIS — R05.1 ACUTE COUGH: ICD-10-CM

## 2023-12-14 DIAGNOSIS — R26.81 UNSTEADY GAIT WHEN WALKING: Primary | ICD-10-CM

## 2023-12-14 DIAGNOSIS — S09.90XA CLOSED HEAD INJURY, INITIAL ENCOUNTER: ICD-10-CM

## 2023-12-14 DIAGNOSIS — R41.0 MENTAL CONFUSION: ICD-10-CM

## 2023-12-14 LAB
ALBUMIN SERPL-MCNC: 3.7 G/DL (ref 3.5–5)
ALBUMIN/GLOB SERPL: 0.9 (ref 1.1–2.2)
ALP SERPL-CCNC: 48 U/L (ref 45–117)
ALT SERPL-CCNC: 16 U/L (ref 12–78)
ANION GAP SERPL CALC-SCNC: 11 MMOL/L (ref 5–15)
APPEARANCE UR: CLEAR
AST SERPL-CCNC: 15 U/L (ref 15–37)
BACTERIA URNS QL MICRO: NEGATIVE /HPF
BASOPHILS # BLD: 0.1 K/UL (ref 0–0.1)
BASOPHILS NFR BLD: 1 % (ref 0–1)
BILIRUB SERPL-MCNC: 0.6 MG/DL (ref 0.2–1)
BILIRUB UR QL: NEGATIVE
BUN SERPL-MCNC: 21 MG/DL (ref 6–20)
BUN/CREAT SERPL: 17 (ref 12–20)
CALCIUM SERPL-MCNC: 10.1 MG/DL (ref 8.5–10.1)
CHLORIDE SERPL-SCNC: 97 MMOL/L (ref 97–108)
CK SERPL-CCNC: 74 U/L (ref 26–192)
CO2 SERPL-SCNC: 31 MMOL/L (ref 21–32)
COLOR UR: ABNORMAL
CREAT SERPL-MCNC: 1.23 MG/DL (ref 0.55–1.02)
DIFFERENTIAL METHOD BLD: NORMAL
EOSINOPHIL # BLD: 0.2 K/UL (ref 0–0.4)
EOSINOPHIL NFR BLD: 3 % (ref 0–7)
EPITH CASTS URNS QL MICRO: ABNORMAL /LPF
ERYTHROCYTE [DISTWIDTH] IN BLOOD BY AUTOMATED COUNT: 13.2 % (ref 11.5–14.5)
GLOBULIN SER CALC-MCNC: 3.9 G/DL (ref 2–4)
GLUCOSE SERPL-MCNC: 308 MG/DL (ref 65–100)
GLUCOSE UR STRIP.AUTO-MCNC: NEGATIVE MG/DL
HCT VFR BLD AUTO: 36.7 % (ref 35–47)
HGB BLD-MCNC: 11.9 G/DL (ref 11.5–16)
HGB UR QL STRIP: ABNORMAL
IMM GRANULOCYTES # BLD AUTO: 0 K/UL (ref 0–0.04)
IMM GRANULOCYTES NFR BLD AUTO: 0 % (ref 0–0.5)
KETONES UR QL STRIP.AUTO: ABNORMAL MG/DL
LEUKOCYTE ESTERASE UR QL STRIP.AUTO: NEGATIVE
LYMPHOCYTES # BLD: 1.1 K/UL (ref 0.8–3.5)
LYMPHOCYTES NFR BLD: 17 % (ref 12–49)
MCH RBC QN AUTO: 31 PG (ref 26–34)
MCHC RBC AUTO-ENTMCNC: 32.4 G/DL (ref 30–36.5)
MCV RBC AUTO: 95.6 FL (ref 80–99)
MONOCYTES # BLD: 0.7 K/UL (ref 0–1)
MONOCYTES NFR BLD: 11 % (ref 5–13)
NEUTS SEG # BLD: 4.6 K/UL (ref 1.8–8)
NEUTS SEG NFR BLD: 68 % (ref 32–75)
NITRITE UR QL STRIP.AUTO: NEGATIVE
NRBC # BLD: 0 K/UL (ref 0–0.01)
NRBC BLD-RTO: 0 PER 100 WBC
PH UR STRIP: 6 (ref 5–8)
PLATELET # BLD AUTO: 294 K/UL (ref 150–400)
PMV BLD AUTO: 9.8 FL (ref 8.9–12.9)
POTASSIUM SERPL-SCNC: 3.8 MMOL/L (ref 3.5–5.1)
PROT SERPL-MCNC: 7.6 G/DL (ref 6.4–8.2)
PROT UR STRIP-MCNC: >300 MG/DL
RBC # BLD AUTO: 3.84 M/UL (ref 3.8–5.2)
RBC #/AREA URNS HPF: ABNORMAL /HPF (ref 0–5)
SARS-COV-2 RDRP RESP QL NAA+PROBE: NOT DETECTED
SODIUM SERPL-SCNC: 139 MMOL/L (ref 136–145)
SOURCE: NORMAL
SP GR UR REFRACTOMETRY: 1.03 (ref 1–1.03)
URINE CULTURE IF INDICATED: ABNORMAL
UROBILINOGEN UR QL STRIP.AUTO: 0.2 EU/DL (ref 0.2–1)
WBC # BLD AUTO: 6.6 K/UL (ref 3.6–11)
WBC URNS QL MICRO: ABNORMAL /HPF (ref 0–4)

## 2023-12-14 PROCEDURE — 85025 COMPLETE CBC W/AUTO DIFF WBC: CPT

## 2023-12-14 PROCEDURE — 99284 EMERGENCY DEPT VISIT MOD MDM: CPT

## 2023-12-14 PROCEDURE — 36415 COLL VENOUS BLD VENIPUNCTURE: CPT

## 2023-12-14 PROCEDURE — 71046 X-RAY EXAM CHEST 2 VIEWS: CPT

## 2023-12-14 PROCEDURE — 82550 ASSAY OF CK (CPK): CPT

## 2023-12-14 PROCEDURE — 81001 URINALYSIS AUTO W/SCOPE: CPT

## 2023-12-14 PROCEDURE — 87635 SARS-COV-2 COVID-19 AMP PRB: CPT

## 2023-12-14 PROCEDURE — 80053 COMPREHEN METABOLIC PANEL: CPT

## 2023-12-14 PROCEDURE — 70450 CT HEAD/BRAIN W/O DYE: CPT

## 2023-12-14 ASSESSMENT — PAIN - FUNCTIONAL ASSESSMENT: PAIN_FUNCTIONAL_ASSESSMENT: NONE - DENIES PAIN

## 2023-12-14 ASSESSMENT — LIFESTYLE VARIABLES: HOW OFTEN DO YOU HAVE A DRINK CONTAINING ALCOHOL: NEVER

## 2023-12-14 NOTE — ED NOTES
Daughter reports patient was seen by her PCP on Tuesday after second fall and they checked her urine and it was normal.      Jammie Murphy, [address] [place] [address]  12/14/23 0285

## 2023-12-14 NOTE — ED NOTES
The patient left the Emergency Department via wheelchair, alert and and in no acute distress. The patient's daughter was encouraged to call or return to the ED for worsening issues or problems and was encouraged to schedule a follow up appointment for continuing care. The patient's daughter verbalized understanding of discharge instructions and all questions were answered. The patient has no further concerns at this time.          Lorena Connolly RN  12/14/23 [id number]

## 2023-12-16 NOTE — ED PROVIDER NOTES
for her current presentation. Patient is otherwise in stable condition at this time, without any signs of distress. Fully awake and alert, pleasant and interactive. Eating and drinking without difficulty in the ER. I feel that she is stable for discharge to home. She will follow up with her PCP early next week for repeat evaluation and ongoing management. Daughter felt comfortable with plan. CONSULTS:  None    PROCEDURES:  Unless otherwise noted below, none     Procedures      FINAL IMPRESSION      1. Unsteady gait when walking    2. Mental confusion    3. Fall, initial encounter    4. Closed head injury, initial encounter    5. Acute cough    6.  Vascular dementia, unspecified dementia severity, unspecified whether behavioral, psychotic, or mood disturbance or anxiety [facility])          DISPOSITION/PLAN   DISPOSITION Decision To Discharge 12/14/2023 03:46:17 PM      PATIENT REFERRED TO:  Sigrid Florian MD  [address] [place] [address]            DISCHARGE MEDICATIONS:  Discharge Medication List as of 12/14/2023  3:51 PM            (Please note that portions of this note were completed with a voice recognition program.  Efforts were made to edit the dictations but occasionally words are mis-transcribed.)    Marika Bartlett MD (electronically signed)  Emergency Attending Physician / Physician Assistant / Nurse Practitioner              Marika Bartlett MD  12/16/23 [phone]

## 2024-01-03 RX ORDER — ROSUVASTATIN CALCIUM 5 MG/1
TABLET, COATED ORAL
Qty: 40 TABLET | Refills: 0 | Status: SHIPPED | OUTPATIENT
Start: 2024-01-03

## 2024-04-10 DIAGNOSIS — Z79.4 TYPE 2 DIABETES MELLITUS WITHOUT COMPLICATION, WITH LONG-TERM CURRENT USE OF INSULIN (HCC): ICD-10-CM

## 2024-04-10 DIAGNOSIS — E78.5 HYPERLIPIDEMIA LDL GOAL <100: ICD-10-CM

## 2024-04-10 DIAGNOSIS — I10 ESSENTIAL (PRIMARY) HYPERTENSION: ICD-10-CM

## 2024-04-10 DIAGNOSIS — E11.9 TYPE 2 DIABETES MELLITUS WITHOUT COMPLICATION, WITH LONG-TERM CURRENT USE OF INSULIN (HCC): ICD-10-CM

## 2024-04-15 RX ORDER — ROSUVASTATIN CALCIUM 5 MG/1
TABLET, COATED ORAL
Qty: 40 TABLET | Refills: 3 | Status: SHIPPED | OUTPATIENT
Start: 2024-04-15

## 2024-07-19 LAB
HBA1C MFR BLD HPLC: 7.3 %
LDL CHOLESTEROL, EXTERNAL: 64
MICROALBUMIN/CREATININE RATIO, EXTERNAL: 1376

## 2024-07-31 RX ORDER — INSULIN GLARGINE 100 [IU]/ML
INJECTION, SOLUTION SUBCUTANEOUS
Qty: 30 ML | Refills: 3 | Status: SHIPPED | OUTPATIENT
Start: 2024-07-31

## 2024-08-20 ENCOUNTER — OFFICE VISIT (OUTPATIENT)
Age: 84
End: 2024-08-20
Payer: MEDICARE

## 2024-08-20 VITALS
HEART RATE: 62 BPM | WEIGHT: 124.4 LBS | SYSTOLIC BLOOD PRESSURE: 180 MMHG | HEIGHT: 61 IN | DIASTOLIC BLOOD PRESSURE: 80 MMHG | BODY MASS INDEX: 23.49 KG/M2

## 2024-08-20 DIAGNOSIS — E78.5 HYPERLIPIDEMIA LDL GOAL <100: ICD-10-CM

## 2024-08-20 DIAGNOSIS — Z79.4 TYPE 2 DIABETES MELLITUS WITHOUT COMPLICATION, WITH LONG-TERM CURRENT USE OF INSULIN (HCC): Primary | ICD-10-CM

## 2024-08-20 DIAGNOSIS — I10 ESSENTIAL (PRIMARY) HYPERTENSION: ICD-10-CM

## 2024-08-20 DIAGNOSIS — E11.9 TYPE 2 DIABETES MELLITUS WITHOUT COMPLICATION, WITH LONG-TERM CURRENT USE OF INSULIN (HCC): Primary | ICD-10-CM

## 2024-08-20 PROCEDURE — G8420 CALC BMI NORM PARAMETERS: HCPCS | Performed by: INTERNAL MEDICINE

## 2024-08-20 PROCEDURE — 1123F ACP DISCUSS/DSCN MKR DOCD: CPT | Performed by: INTERNAL MEDICINE

## 2024-08-20 PROCEDURE — 3077F SYST BP >= 140 MM HG: CPT | Performed by: INTERNAL MEDICINE

## 2024-08-20 PROCEDURE — 1090F PRES/ABSN URINE INCON ASSESS: CPT | Performed by: INTERNAL MEDICINE

## 2024-08-20 PROCEDURE — G8400 PT W/DXA NO RESULTS DOC: HCPCS | Performed by: INTERNAL MEDICINE

## 2024-08-20 PROCEDURE — 1036F TOBACCO NON-USER: CPT | Performed by: INTERNAL MEDICINE

## 2024-08-20 PROCEDURE — G8427 DOCREV CUR MEDS BY ELIG CLIN: HCPCS | Performed by: INTERNAL MEDICINE

## 2024-08-20 PROCEDURE — G2211 COMPLEX E/M VISIT ADD ON: HCPCS | Performed by: INTERNAL MEDICINE

## 2024-08-20 PROCEDURE — 99214 OFFICE O/P EST MOD 30 MIN: CPT | Performed by: INTERNAL MEDICINE

## 2024-08-20 PROCEDURE — 3079F DIAST BP 80-89 MM HG: CPT | Performed by: INTERNAL MEDICINE

## 2024-08-20 RX ORDER — MULTIVITAMIN WITH IRON
250 TABLET ORAL DAILY
COMMUNITY

## 2024-08-20 RX ORDER — ROSUVASTATIN CALCIUM 5 MG/1
TABLET, COATED ORAL
Qty: 40 TABLET | Refills: 3 | Status: SHIPPED | OUTPATIENT
Start: 2024-08-20

## 2024-08-20 NOTE — PATIENT INSTRUCTIONS
1) Dinorah rosuvastatin para colesterol 1 pastilla 2 veces por semana para ayudar con dolor de las piernas.    2) Sigue tomando metformin 1 pastilla 2 veces al angelica y lantus 18 unidades en la manana.    3) [facility] y [facility] un registro a Dr. Levin.

## 2024-08-20 NOTE — PROGRESS NOTES
Chief Complaint   Patient presents with    Diabetes     PCP and pharmacy confirmed     History of Present Illness: Mag Montiel is a 83 y.o. female here for follow up of diabetes.  Weight up 2 lbs since last visit in 12/23.  She is here with her daughter, Gita Pat, who is helping with translation.  She has been taking crestor 5 mg 3x/week but still is getting leg pains so we will try decreasing to 2x/week to see if this helps.  Ended up decreasing her metformin to 1 tab bid and her lantus to 18 units in the morning to help with low sugars and currently her sugars are staying in the 100-130 range in the morning.  Compliant with BP regimen of diltiazem and ramipril and home BP readings are in the 130-140s systolic.  Due to see Dr. Levin later this week and I advised pt and her daughter to keep a log of her readings to bring to that visit and bring their machine to make sure it's accurate.      Current Outpatient Medications   Medication Sig    magnesium (MAGNESIUM-OXIDE) 250 MG TABS tablet Take 1 tablet by mouth daily    sertraline (ZOLOFT) 100 MG tablet     LANTUS SOLOSTAR 100 UNIT/ML injection pen Inject 20 units in the morning and none at night (Patient taking differently: Inject 18 units in the morning and none at night)    rosuvastatin (CRESTOR) 5 MG tablet TAKE ONE TABLET BY MOUTH BEFORE DINNER THREE TIMES PER WEEK ON MONDAY, WEDNESDAY AND FRIDAY FOR CHOLESTEROL    vitamin B-12 (CYANOCOBALAMIN) 1000 MCG tablet Take 1 tablet by mouth daily Three times weekly    ramipril (ALTACE) 10 MG capsule Take 1 capsule by mouth daily    metFORMIN (GLUCOPHAGE) 500 MG tablet Take 2 tabs in the morning and 1 tab at night (Patient taking differently: 1 tab in the AM 1 tab at night)    dilTIAZem (CARDIZEM CD) 240 MG extended release capsule Take 1 capsule by mouth daily Replaces the 180 mg caps and delete the triamterene/hctz from profile    Multiple Vitamin (MULTI-DAY) TABS Take 1 tablet by mouth daily     No current

## 2024-08-28 RX ORDER — INSULIN GLARGINE 100 [IU]/ML
INJECTION, SOLUTION SUBCUTANEOUS
Qty: 30 ML | Refills: 3 | Status: SHIPPED | OUTPATIENT
Start: 2024-08-28

## 2024-10-08 ENCOUNTER — OFFICE VISIT (OUTPATIENT)
Age: 84
End: 2024-10-08
Payer: MEDICARE

## 2024-10-08 ENCOUNTER — HOSPITAL ENCOUNTER (EMERGENCY)
Facility: HOSPITAL | Age: 84
Discharge: HOME | End: 2024-10-09
Attending: EMERGENCY MEDICINE
Payer: MEDICARE

## 2024-10-08 ENCOUNTER — APPOINTMENT (OUTPATIENT)
Facility: HOSPITAL | Age: 84
End: 2024-10-08
Payer: MEDICARE

## 2024-10-08 VITALS
TEMPERATURE: 97.9 F | WEIGHT: 126.1 LBS | OXYGEN SATURATION: 97 % | RESPIRATION RATE: 18 BRPM | BODY MASS INDEX: 23.83 KG/M2 | SYSTOLIC BLOOD PRESSURE: 160 MMHG | HEART RATE: 60 BPM | DIASTOLIC BLOOD PRESSURE: 80 MMHG

## 2024-10-08 DIAGNOSIS — F01.A3 MILD VASCULAR DEMENTIA WITH MOOD DISTURBANCE (HCC): Primary | ICD-10-CM

## 2024-10-08 DIAGNOSIS — F32.A ANXIETY AND DEPRESSION: ICD-10-CM

## 2024-10-08 DIAGNOSIS — G47.01 INSOMNIA DUE TO MEDICAL CONDITION: ICD-10-CM

## 2024-10-08 DIAGNOSIS — I16.0 HYPERTENSIVE URGENCY: Primary | ICD-10-CM

## 2024-10-08 DIAGNOSIS — Z86.73 HISTORY OF STROKE: ICD-10-CM

## 2024-10-08 DIAGNOSIS — F41.9 ANXIETY AND DEPRESSION: ICD-10-CM

## 2024-10-08 PROCEDURE — 1123F ACP DISCUSS/DSCN MKR DOCD: CPT | Performed by: PSYCHIATRY & NEUROLOGY

## 2024-10-08 PROCEDURE — 3079F DIAST BP 80-89 MM HG: CPT | Performed by: PSYCHIATRY & NEUROLOGY

## 2024-10-08 PROCEDURE — G8427 DOCREV CUR MEDS BY ELIG CLIN: HCPCS | Performed by: PSYCHIATRY & NEUROLOGY

## 2024-10-08 PROCEDURE — 99205 OFFICE O/P NEW HI 60 MIN: CPT | Performed by: PSYCHIATRY & NEUROLOGY

## 2024-10-08 PROCEDURE — 1090F PRES/ABSN URINE INCON ASSESS: CPT | Performed by: PSYCHIATRY & NEUROLOGY

## 2024-10-08 PROCEDURE — G8484 FLU IMMUNIZE NO ADMIN: HCPCS | Performed by: PSYCHIATRY & NEUROLOGY

## 2024-10-08 PROCEDURE — G8400 PT W/DXA NO RESULTS DOC: HCPCS | Performed by: PSYCHIATRY & NEUROLOGY

## 2024-10-08 PROCEDURE — G8420 CALC BMI NORM PARAMETERS: HCPCS | Performed by: PSYCHIATRY & NEUROLOGY

## 2024-10-08 PROCEDURE — 99284 EMERGENCY DEPT VISIT MOD MDM: CPT

## 2024-10-08 PROCEDURE — 70450 CT HEAD/BRAIN W/O DYE: CPT

## 2024-10-08 PROCEDURE — 1036F TOBACCO NON-USER: CPT | Performed by: PSYCHIATRY & NEUROLOGY

## 2024-10-08 PROCEDURE — 3077F SYST BP >= 140 MM HG: CPT | Performed by: PSYCHIATRY & NEUROLOGY

## 2024-10-08 RX ORDER — RIVASTIGMINE 4.6 MG/24H
1 PATCH, EXTENDED RELEASE TRANSDERMAL DAILY
COMMUNITY

## 2024-10-08 RX ORDER — ACETAMINOPHEN 500 MG
1000 TABLET ORAL
Status: COMPLETED | OUTPATIENT
Start: 2024-10-08 | End: 2024-10-09

## 2024-10-08 RX ORDER — TRAZODONE HYDROCHLORIDE 50 MG/1
TABLET, FILM COATED ORAL
Qty: 90 TABLET | Refills: 3 | Status: SHIPPED | OUTPATIENT
Start: 2024-10-08

## 2024-10-08 RX ORDER — DONEPEZIL HYDROCHLORIDE 5 MG/1
5 TABLET, FILM COATED ORAL DAILY
Qty: 30 TABLET | Refills: 5 | Status: SHIPPED | OUTPATIENT
Start: 2024-10-08 | End: 2025-04-06

## 2024-10-08 ASSESSMENT — PAIN DESCRIPTION - LOCATION: LOCATION: HEAD

## 2024-10-08 ASSESSMENT — PAIN DESCRIPTION - DESCRIPTORS: DESCRIPTORS: ACHING

## 2024-10-08 ASSESSMENT — PAIN SCALES - GENERAL: PAINLEVEL_OUTOF10: 3

## 2024-10-08 ASSESSMENT — PAIN - FUNCTIONAL ASSESSMENT: PAIN_FUNCTIONAL_ASSESSMENT: 0-10

## 2024-10-08 NOTE — PATIENT INSTRUCTIONS
Patient Education        Helping A Person With Dementia: Care Instructions  Your Care Instructions     Dementia is a loss of mental skills that affects daily life. It is different from mild memory loss that occurs with aging. Dementia can cause problems with memory, thinking clearly, and planning. It is different for everyone. But it usually gets worse slowly. Some people who have dementia can function well for a long time. But at some point it may become hard for the person to care for himself or herself.  It can be upsetting to learn that a loved one has this condition. You may be afraid and worried about what will happen. You may wonder how you will care for the person. There is no cure for dementia. But medicine may be able to slow memory loss and improve thinking for a while. Other medicines may help with sleep, depression, and behavior changes.  Dementia is different for everyone. In some cases, people can function well for a long time. You can help your loved one by making his or her home life easier and safer. You also need to take care of yourself. Caregiving can be stressful. But support is available to help you and give you a break when you need it.  The Alzheimer's Association offers good information and support. If you are caring for someone with dementia, you can help make life safer and more comfortable. You can also help your loved one make decisions about future care. You may also want to bring up legal and financial issues. These are hard but important conversations to have.  Follow-up care is a key part of your loved one's treatment and safety. Be sure to make and go to all appointments, and call your doctor if your loved one is having problems. It's also a good idea to know your loved one's test results and keep a list of the medicines he or she takes.  How can you care for your loved one at home?  Taking care of the person  If the person takes medicine for dementia, help him or her take it

## 2024-10-09 VITALS
SYSTOLIC BLOOD PRESSURE: 190 MMHG | HEIGHT: 61 IN | BODY MASS INDEX: 23.79 KG/M2 | TEMPERATURE: 98.3 F | RESPIRATION RATE: 14 BRPM | OXYGEN SATURATION: 95 % | HEART RATE: 64 BPM | DIASTOLIC BLOOD PRESSURE: 67 MMHG | WEIGHT: 126 LBS

## 2024-10-09 LAB
ALBUMIN SERPL-MCNC: 3.5 G/DL (ref 3.5–5)
ALBUMIN/GLOB SERPL: 1.1 (ref 1.1–2.2)
ALP SERPL-CCNC: 43 U/L (ref 45–117)
ALT SERPL-CCNC: 13 U/L (ref 12–78)
ANION GAP SERPL CALC-SCNC: 7 MMOL/L (ref 2–12)
AST SERPL-CCNC: 12 U/L (ref 15–37)
BASOPHILS # BLD: 0.1 K/UL (ref 0–0.1)
BASOPHILS NFR BLD: 1 % (ref 0–1)
BILIRUB SERPL-MCNC: 0.5 MG/DL (ref 0.2–1)
BUN SERPL-MCNC: 23 MG/DL (ref 6–20)
BUN/CREAT SERPL: 23 (ref 12–20)
CALCIUM SERPL-MCNC: 9.6 MG/DL (ref 8.5–10.1)
CHLORIDE SERPL-SCNC: 103 MMOL/L (ref 97–108)
CO2 SERPL-SCNC: 32 MMOL/L (ref 21–32)
CREAT SERPL-MCNC: 1.02 MG/DL (ref 0.55–1.02)
DIFFERENTIAL METHOD BLD: ABNORMAL
EKG ATRIAL RATE: 63 BPM
EKG DIAGNOSIS: NORMAL
EKG P AXIS: 20 DEGREES
EKG P-R INTERVAL: 174 MS
EKG Q-T INTERVAL: 408 MS
EKG QRS DURATION: 78 MS
EKG QTC CALCULATION (BAZETT): 417 MS
EKG R AXIS: -12 DEGREES
EKG T AXIS: 103 DEGREES
EKG VENTRICULAR RATE: 63 BPM
EOSINOPHIL # BLD: 0.2 K/UL (ref 0–0.4)
EOSINOPHIL NFR BLD: 2 % (ref 0–7)
ERYTHROCYTE [DISTWIDTH] IN BLOOD BY AUTOMATED COUNT: 13.2 % (ref 11.5–14.5)
GLOBULIN SER CALC-MCNC: 3.3 G/DL (ref 2–4)
GLUCOSE SERPL-MCNC: 128 MG/DL (ref 65–100)
HCT VFR BLD AUTO: 31.8 % (ref 35–47)
HGB BLD-MCNC: 10.6 G/DL (ref 11.5–16)
IMM GRANULOCYTES # BLD AUTO: 0 K/UL (ref 0–0.04)
IMM GRANULOCYTES NFR BLD AUTO: 0 % (ref 0–0.5)
LYMPHOCYTES # BLD: 2.1 K/UL (ref 0.8–3.5)
LYMPHOCYTES NFR BLD: 28 % (ref 12–49)
MCH RBC QN AUTO: 30.5 PG (ref 26–34)
MCHC RBC AUTO-ENTMCNC: 33.3 G/DL (ref 30–36.5)
MCV RBC AUTO: 91.4 FL (ref 80–99)
MONOCYTES # BLD: 0.5 K/UL (ref 0–1)
MONOCYTES NFR BLD: 7 % (ref 5–13)
NEUTS SEG # BLD: 4.6 K/UL (ref 1.8–8)
NEUTS SEG NFR BLD: 62 % (ref 32–75)
NRBC # BLD: 0 K/UL (ref 0–0.01)
NRBC BLD-RTO: 0 PER 100 WBC
NT PRO BNP: 1084 PG/ML (ref 0–450)
PLATELET # BLD AUTO: 262 K/UL (ref 150–400)
PMV BLD AUTO: 9.7 FL (ref 8.9–12.9)
POTASSIUM SERPL-SCNC: 3.5 MMOL/L (ref 3.5–5.1)
PROT SERPL-MCNC: 6.8 G/DL (ref 6.4–8.2)
RBC # BLD AUTO: 3.48 M/UL (ref 3.8–5.2)
SODIUM SERPL-SCNC: 142 MMOL/L (ref 136–145)
TROPONIN I SERPL HS-MCNC: 21 NG/L (ref 0–51)
WBC # BLD AUTO: 7.5 K/UL (ref 3.6–11)

## 2024-10-09 PROCEDURE — 83880 ASSAY OF NATRIURETIC PEPTIDE: CPT

## 2024-10-09 PROCEDURE — 6370000000 HC RX 637 (ALT 250 FOR IP): Performed by: EMERGENCY MEDICINE

## 2024-10-09 PROCEDURE — 93005 ELECTROCARDIOGRAM TRACING: CPT | Performed by: EMERGENCY MEDICINE

## 2024-10-09 PROCEDURE — 80053 COMPREHEN METABOLIC PANEL: CPT

## 2024-10-09 PROCEDURE — 85025 COMPLETE CBC W/AUTO DIFF WBC: CPT

## 2024-10-09 PROCEDURE — 84484 ASSAY OF TROPONIN QUANT: CPT

## 2024-10-09 PROCEDURE — 36415 COLL VENOUS BLD VENIPUNCTURE: CPT

## 2024-10-09 RX ADMIN — ACETAMINOPHEN 1000 MG: 500 TABLET ORAL at 00:54

## 2024-10-09 NOTE — ED PROVIDER NOTES
[facility]  EMERGENCY DEPARTMENT ENCOUNTER      Pt Name: Mag Montiel  MRN: [id number]  Birthdate 1940  Date of evaluation: 10/8/2024  Provider: Arron Morales DO      HISTORY OF PRESENT ILLNESS      HPI  83-year-old female with history as below presents to the emergency department with her daughter stating that she has a history of hypertension and follows with cardiology with a recent increase in her blood pressure medication regimen, but was found to have hypertension with /117 prior to arrival.  She reported a mild frontal headache and some blurry vision earlier states that the vision has improved.  She has a history of some confusion and is following with neurology whom she reportedly saw today and was Rx'd aspirin.  She has a history of vascular dementia.  Patient's daughter states that she is at her neurologic baseline currently.  The patient denies any numbness, weakness, chest pain, shortness of breath and when asked how she feels she states that she feels fine.  She has not taken anything for her headache stating that it is mild.  No light sensitivity or nausea or vomiting or any other medical concerns.      Nursing Notes were reviewed.    REVIEW OF SYSTEMS         Review of Systems        PAST MEDICAL HISTORY     Past Medical History:   Diagnosis Date    Diabetes (HCC) 2002    Diverticulitis of colon     Hearing loss 2011    Hypercholesterolemia     Hypertension     Osteopenia     Vascular dementia (HCC)          SURGICAL HISTORY       Past Surgical History:   Procedure Laterality Date    CHOLECYSTECTOMY      COLONOSCOPY  2012    small polyp    GI  2012    EGD gastric polyp    HEENT  5/2003    cat. ext. O.S. - bilateral         CURRENT MEDICATIONS       Previous Medications    CARVEDILOL (COREG) 6.25 MG TABLET    Take 1 tablet by mouth 2 times daily (with meals)    DONEPEZIL (ARICEPT) 5 MG TABLET    Take 1 tablet by mouth daily    LANTUS SOLOSTAR 100 UNIT/ML INJECTION PEN    Inject

## 2024-10-22 ENCOUNTER — APPOINTMENT (OUTPATIENT)
Facility: HOSPITAL | Age: 84
End: 2024-10-22
Payer: MEDICARE

## 2024-10-22 ENCOUNTER — HOSPITAL ENCOUNTER (INPATIENT)
Facility: HOSPITAL | Age: 84
LOS: 4 days | Discharge: HOME | End: 2024-10-26
Attending: EMERGENCY MEDICINE | Admitting: INTERNAL MEDICINE
Payer: MEDICARE

## 2024-10-22 DIAGNOSIS — R50.9 ACUTE FEBRILE ILLNESS: Primary | ICD-10-CM

## 2024-10-22 DIAGNOSIS — A41.9 SEPSIS WITHOUT ACUTE ORGAN DYSFUNCTION, DUE TO UNSPECIFIED ORGANISM (HCC): ICD-10-CM

## 2024-10-22 PROBLEM — J18.9 SEPSIS DUE TO PNEUMONIA (HCC): Status: ACTIVE | Noted: 2024-10-22

## 2024-10-22 PROBLEM — R65.10 SIRS (SYSTEMIC INFLAMMATORY RESPONSE SYNDROME) (HCC): Status: ACTIVE | Noted: 2024-10-22

## 2024-10-22 LAB
ALBUMIN SERPL-MCNC: 3.6 G/DL (ref 3.5–5)
ALBUMIN/GLOB SERPL: 0.9 (ref 1.1–2.2)
ALP SERPL-CCNC: 58 U/L (ref 45–117)
ALT SERPL-CCNC: 24 U/L (ref 12–78)
ANION GAP SERPL CALC-SCNC: 6 MMOL/L (ref 2–12)
APPEARANCE UR: CLEAR
AST SERPL-CCNC: 16 U/L (ref 15–37)
BACTERIA URNS QL MICRO: NEGATIVE /HPF
BASOPHILS # BLD: 0.1 K/UL (ref 0–0.1)
BASOPHILS NFR BLD: 1 % (ref 0–1)
BILIRUB SERPL-MCNC: 0.9 MG/DL (ref 0.2–1)
BILIRUB UR QL: NEGATIVE
BUN SERPL-MCNC: 20 MG/DL (ref 6–20)
BUN/CREAT SERPL: 15 (ref 12–20)
CALCIUM SERPL-MCNC: 9.5 MG/DL (ref 8.5–10.1)
CHLORIDE SERPL-SCNC: 96 MMOL/L (ref 97–108)
CO2 SERPL-SCNC: 29 MMOL/L (ref 21–32)
COLOR UR: ABNORMAL
CREAT SERPL-MCNC: 1.32 MG/DL (ref 0.55–1.02)
DIFFERENTIAL METHOD BLD: ABNORMAL
EKG ATRIAL RATE: 208 BPM
EKG DIAGNOSIS: NORMAL
EKG P AXIS: 21 DEGREES
EKG Q-T INTERVAL: 304 MS
EKG QRS DURATION: 78 MS
EKG QTC CALCULATION (BAZETT): 350 MS
EKG R AXIS: -14 DEGREES
EKG T AXIS: 113 DEGREES
EKG VENTRICULAR RATE: 80 BPM
EOSINOPHIL # BLD: 0 K/UL (ref 0–0.4)
EOSINOPHIL NFR BLD: 0 % (ref 0–7)
EPITH CASTS URNS QL MICRO: ABNORMAL /LPF
ERYTHROCYTE [DISTWIDTH] IN BLOOD BY AUTOMATED COUNT: 14 % (ref 11.5–14.5)
FLUAV RNA SPEC QL NAA+PROBE: NOT DETECTED
FLUBV RNA SPEC QL NAA+PROBE: NOT DETECTED
GLOBULIN SER CALC-MCNC: 3.9 G/DL (ref 2–4)
GLUCOSE BLD STRIP.AUTO-MCNC: 159 MG/DL (ref 65–117)
GLUCOSE BLD STRIP.AUTO-MCNC: 206 MG/DL (ref 65–117)
GLUCOSE SERPL-MCNC: 239 MG/DL (ref 65–100)
GLUCOSE UR STRIP.AUTO-MCNC: NEGATIVE MG/DL
HCT VFR BLD AUTO: 33.6 % (ref 35–47)
HGB BLD-MCNC: 11.2 G/DL (ref 11.5–16)
HGB UR QL STRIP: NEGATIVE
IMM GRANULOCYTES # BLD AUTO: 0.1 K/UL (ref 0–0.04)
IMM GRANULOCYTES NFR BLD AUTO: 1 % (ref 0–0.5)
KETONES UR QL STRIP.AUTO: NEGATIVE MG/DL
LACTATE SERPL-SCNC: 1.1 MMOL/L (ref 0.4–2)
LEUKOCYTE ESTERASE UR QL STRIP.AUTO: NEGATIVE
LYMPHOCYTES # BLD: 0.8 K/UL (ref 0.8–3.5)
LYMPHOCYTES NFR BLD: 6 % (ref 12–49)
MCH RBC QN AUTO: 30.4 PG (ref 26–34)
MCHC RBC AUTO-ENTMCNC: 33.3 G/DL (ref 30–36.5)
MCV RBC AUTO: 91.3 FL (ref 80–99)
MONOCYTES # BLD: 0.9 K/UL (ref 0–1)
MONOCYTES NFR BLD: 7 % (ref 5–13)
NEUTS SEG # BLD: 11 K/UL (ref 1.8–8)
NEUTS SEG NFR BLD: 85 % (ref 32–75)
NITRITE UR QL STRIP.AUTO: NEGATIVE
NRBC # BLD: 0 K/UL (ref 0–0.01)
NRBC BLD-RTO: 0 PER 100 WBC
PH UR STRIP: 6.5 (ref 5–8)
PLATELET # BLD AUTO: 290 K/UL (ref 150–400)
PMV BLD AUTO: 9.8 FL (ref 8.9–12.9)
POTASSIUM SERPL-SCNC: 3.2 MMOL/L (ref 3.5–5.1)
PROCALCITONIN SERPL-MCNC: 0.16 NG/ML
PROT SERPL-MCNC: 7.5 G/DL (ref 6.4–8.2)
PROT UR STRIP-MCNC: 100 MG/DL
RBC # BLD AUTO: 3.68 M/UL (ref 3.8–5.2)
RBC #/AREA URNS HPF: ABNORMAL /HPF (ref 0–5)
RBC MORPH BLD: ABNORMAL
SARS-COV-2 RNA RESP QL NAA+PROBE: NOT DETECTED
SERVICE CMNT-IMP: ABNORMAL
SERVICE CMNT-IMP: ABNORMAL
SODIUM SERPL-SCNC: 131 MMOL/L (ref 136–145)
SOURCE: NORMAL
SP GR UR REFRACTOMETRY: 1.02 (ref 1–1.03)
TROPONIN I SERPL HS-MCNC: 54 NG/L (ref 0–51)
UROBILINOGEN UR QL STRIP.AUTO: 0.2 EU/DL (ref 0.2–1)
WBC # BLD AUTO: 12.9 K/UL (ref 3.6–11)
WBC URNS QL MICRO: ABNORMAL /HPF (ref 0–4)

## 2024-10-22 PROCEDURE — 85025 COMPLETE CBC W/AUTO DIFF WBC: CPT

## 2024-10-22 PROCEDURE — 81001 URINALYSIS AUTO W/SCOPE: CPT

## 2024-10-22 PROCEDURE — 6370000000 HC RX 637 (ALT 250 FOR IP): Performed by: FAMILY MEDICINE

## 2024-10-22 PROCEDURE — 6370000000 HC RX 637 (ALT 250 FOR IP): Performed by: EMERGENCY MEDICINE

## 2024-10-22 PROCEDURE — 2500000003 HC RX 250 WO HCPCS: Performed by: FAMILY MEDICINE

## 2024-10-22 PROCEDURE — 70450 CT HEAD/BRAIN W/O DYE: CPT

## 2024-10-22 PROCEDURE — 0202U NFCT DS 22 TRGT SARS-COV-2: CPT

## 2024-10-22 PROCEDURE — 87040 BLOOD CULTURE FOR BACTERIA: CPT

## 2024-10-22 PROCEDURE — 96361 HYDRATE IV INFUSION ADD-ON: CPT

## 2024-10-22 PROCEDURE — 82962 GLUCOSE BLOOD TEST: CPT

## 2024-10-22 PROCEDURE — 84145 PROCALCITONIN (PCT): CPT

## 2024-10-22 PROCEDURE — 80053 COMPREHEN METABOLIC PANEL: CPT

## 2024-10-22 PROCEDURE — 71045 X-RAY EXAM CHEST 1 VIEW: CPT

## 2024-10-22 PROCEDURE — 2580000003 HC RX 258: Performed by: FAMILY MEDICINE

## 2024-10-22 PROCEDURE — 83605 ASSAY OF LACTIC ACID: CPT

## 2024-10-22 PROCEDURE — 71250 CT THORAX DX C-: CPT

## 2024-10-22 PROCEDURE — 2060000000 HC ICU INTERMEDIATE R&B

## 2024-10-22 PROCEDURE — 84484 ASSAY OF TROPONIN QUANT: CPT

## 2024-10-22 PROCEDURE — 93010 ELECTROCARDIOGRAM REPORT: CPT | Performed by: SPECIALIST

## 2024-10-22 PROCEDURE — 87636 SARSCOV2 & INF A&B AMP PRB: CPT

## 2024-10-22 PROCEDURE — 6360000002 HC RX W HCPCS: Performed by: EMERGENCY MEDICINE

## 2024-10-22 PROCEDURE — 96374 THER/PROPH/DIAG INJ IV PUSH: CPT

## 2024-10-22 PROCEDURE — 36415 COLL VENOUS BLD VENIPUNCTURE: CPT

## 2024-10-22 PROCEDURE — 93005 ELECTROCARDIOGRAM TRACING: CPT | Performed by: EMERGENCY MEDICINE

## 2024-10-22 PROCEDURE — 99285 EMERGENCY DEPT VISIT HI MDM: CPT

## 2024-10-22 PROCEDURE — 2580000003 HC RX 258: Performed by: EMERGENCY MEDICINE

## 2024-10-22 RX ORDER — ACETAMINOPHEN 500 MG
1000 TABLET ORAL
Status: COMPLETED | OUTPATIENT
Start: 2024-10-22 | End: 2024-10-22

## 2024-10-22 RX ORDER — SODIUM CHLORIDE 9 MG/ML
INJECTION, SOLUTION INTRAVENOUS PRN
Status: DISCONTINUED | OUTPATIENT
Start: 2024-10-22 | End: 2024-10-26 | Stop reason: HOSPADM

## 2024-10-22 RX ORDER — ASPIRIN 325 MG
325 TABLET ORAL DAILY
COMMUNITY

## 2024-10-22 RX ORDER — SODIUM CHLORIDE 0.9 % (FLUSH) 0.9 %
5-40 SYRINGE (ML) INJECTION PRN
Status: DISCONTINUED | OUTPATIENT
Start: 2024-10-22 | End: 2024-10-26 | Stop reason: HOSPADM

## 2024-10-22 RX ORDER — SODIUM CHLORIDE 0.9 % (FLUSH) 0.9 %
5-40 SYRINGE (ML) INJECTION EVERY 12 HOURS SCHEDULED
Status: DISCONTINUED | OUTPATIENT
Start: 2024-10-22 | End: 2024-10-26 | Stop reason: HOSPADM

## 2024-10-22 RX ORDER — 0.9 % SODIUM CHLORIDE 0.9 %
1000 INTRAVENOUS SOLUTION INTRAVENOUS ONCE
Status: COMPLETED | OUTPATIENT
Start: 2024-10-22 | End: 2024-10-22

## 2024-10-22 RX ORDER — DONEPEZIL HYDROCHLORIDE 5 MG/1
5 TABLET, FILM COATED ORAL DAILY
Status: DISCONTINUED | OUTPATIENT
Start: 2024-10-23 | End: 2024-10-26 | Stop reason: HOSPADM

## 2024-10-22 RX ORDER — DEXTROSE MONOHYDRATE 100 MG/ML
INJECTION, SOLUTION INTRAVENOUS CONTINUOUS PRN
Status: DISCONTINUED | OUTPATIENT
Start: 2024-10-22 | End: 2024-10-26 | Stop reason: HOSPADM

## 2024-10-22 RX ORDER — LOSARTAN POTASSIUM 50 MG/1
100 TABLET ORAL DAILY
Status: DISCONTINUED | OUTPATIENT
Start: 2024-10-23 | End: 2024-10-26 | Stop reason: HOSPADM

## 2024-10-22 RX ORDER — INSULIN LISPRO 100 [IU]/ML
0-4 INJECTION, SOLUTION INTRAVENOUS; SUBCUTANEOUS
Status: DISCONTINUED | OUTPATIENT
Start: 2024-10-22 | End: 2024-10-26 | Stop reason: HOSPADM

## 2024-10-22 RX ORDER — ACETAMINOPHEN 325 MG/1
650 TABLET ORAL EVERY 6 HOURS PRN
Status: DISCONTINUED | OUTPATIENT
Start: 2024-10-22 | End: 2024-10-26 | Stop reason: HOSPADM

## 2024-10-22 RX ORDER — MIRTAZAPINE 7.5 MG/1
7.5 TABLET, FILM COATED ORAL PRN
COMMUNITY
Start: 2024-10-15

## 2024-10-22 RX ORDER — MIRTAZAPINE 15 MG/1
7.5 TABLET, FILM COATED ORAL NIGHTLY PRN
Status: DISCONTINUED | OUTPATIENT
Start: 2024-10-22 | End: 2024-10-26 | Stop reason: HOSPADM

## 2024-10-22 RX ORDER — HYDRALAZINE HYDROCHLORIDE 50 MG/1
50 TABLET, FILM COATED ORAL 2 TIMES DAILY
Status: DISCONTINUED | OUTPATIENT
Start: 2024-10-22 | End: 2024-10-23

## 2024-10-22 RX ORDER — ENOXAPARIN SODIUM 100 MG/ML
30 INJECTION SUBCUTANEOUS DAILY
Status: DISCONTINUED | OUTPATIENT
Start: 2024-10-23 | End: 2024-10-26 | Stop reason: HOSPADM

## 2024-10-22 RX ORDER — ACETAMINOPHEN 650 MG/1
650 SUPPOSITORY RECTAL EVERY 6 HOURS PRN
Status: DISCONTINUED | OUTPATIENT
Start: 2024-10-22 | End: 2024-10-26 | Stop reason: HOSPADM

## 2024-10-22 RX ORDER — HYDRALAZINE HYDROCHLORIDE 50 MG/1
50 TABLET, FILM COATED ORAL 2 TIMES DAILY
Status: ON HOLD | COMMUNITY
Start: 2024-10-17 | End: 2024-10-26 | Stop reason: HOSPADM

## 2024-10-22 RX ORDER — ASPIRIN 325 MG
325 TABLET ORAL DAILY
Status: DISCONTINUED | OUTPATIENT
Start: 2024-10-23 | End: 2024-10-26 | Stop reason: HOSPADM

## 2024-10-22 RX ORDER — MELOXICAM 7.5 MG/1
7.5 TABLET ORAL PRN
Status: ON HOLD | COMMUNITY
Start: 2024-09-25 | End: 2024-10-26 | Stop reason: HOSPADM

## 2024-10-22 RX ADMIN — SODIUM CHLORIDE 1000 ML: 9 INJECTION, SOLUTION INTRAVENOUS at 11:34

## 2024-10-22 RX ADMIN — ACETAMINOPHEN 650 MG: 325 TABLET ORAL at 22:15

## 2024-10-22 RX ADMIN — DOXYCYCLINE 100 MG: 100 INJECTION, POWDER, LYOPHILIZED, FOR SOLUTION INTRAVENOUS at 22:34

## 2024-10-22 RX ADMIN — ACETAMINOPHEN 1000 MG: 500 TABLET ORAL at 11:23

## 2024-10-22 RX ADMIN — CEFEPIME 2000 MG: 2 INJECTION, POWDER, FOR SOLUTION INTRAVENOUS at 11:48

## 2024-10-22 RX ADMIN — SODIUM CHLORIDE, PRESERVATIVE FREE 10 ML: 5 INJECTION INTRAVENOUS at 22:20

## 2024-10-22 RX ADMIN — HYDRALAZINE HYDROCHLORIDE 50 MG: 50 TABLET ORAL at 22:01

## 2024-10-22 ASSESSMENT — PAIN - FUNCTIONAL ASSESSMENT
PAIN_FUNCTIONAL_ASSESSMENT: NONE - DENIES PAIN

## 2024-10-22 ASSESSMENT — PAIN SCALES - GENERAL: PAINLEVEL_OUTOF10: 0

## 2024-10-22 NOTE — PROGRESS NOTES
Spiritual Health History and Assessment/Progress Note  [facility]    Attempted Encounter,  ,  ,      Name: Mag Montiel MRN: [id number]    Age: 83 y.o.     Sex: female   Language: Wallisian   Zoroastrianism: Rastafari   SIRS (systemic inflammatory response syndrome) (HCC)     Date: 10/22/2024            Total Time Calculated: 8 min              Spiritual Assessment began in [facility]        Referral/Consult From: Rounding   Encounter Overview/Reason: Attempted Encounter  Service Provided For: Patient and family together    [place], Belief, Meaning:   Patient unable to assess at this time  Family/Friends Other: unable to assess.       Importance and Influence:  Patient unable to assess at this time  Family/Friends Other: unable to assess.     Community:  Patient Other: .unable to assess.   Family/Friends Other: unable to assess.     Assessment and Plan of Care:   Per consultation with bedside RN, patient and family declined spiritual care visit.     Patient Interventions include: Other: unable to assess.   Family/Friends Interventions include: Other: unable to assess.     Patient Plan of Care: Other: unable to assess.   Family/Friends Plan of Care: Other: unable to assess.     Electronically signed by REV. MARINA WESTBROOK M.Div, [facility] on 10/22/2024 at 2:20 PM

## 2024-10-22 NOTE — ED NOTES
Patient to CT scan via wheelchair. Dr French to bedside to speak to the family regarding admission.

## 2024-10-22 NOTE — ED NOTES
Reports was given to [facility] transport team. Patient left ED in no acute distress, A/O x3.

## 2024-10-22 NOTE — ED NOTES
TRANSFER - OUT REPORT:    Verbal report given to JADE Boland on Mag Montiel  being transferred to 3N room 353 for routine progression of patient care       Report consisted of patient's Situation, Background, Assessment and   Recommendations(SBAR).     Information from the following report(s) Nurse Handoff Report, ED Encounter Summary, Intake/Output, MAR, Recent Results, and Cardiac Rhythm NSR  was reviewed with the receiving nurse.    [place] Fall Assessment:       Age > 70: Yes  Altered Mental Status, Intoxication with alcohol or substance confusion (Disorientation, impaired judgment, poor safety awaremess, or inability to follow instructions): Yes  Impaired Mobility: Ambulates or transfers with assistive devices or assistance; Unable to ambulate or transer.: Yes  Nursing Judgement: Yes          Lines:   Peripheral IV 10/22/24 Left Antecubital (Active)   Site Assessment Clean, dry & intact 10/22/24 1107   Line Status Blood return noted;Flushed 10/22/24 1107   Line Care Connections checked and tightened 10/22/24 1107   Phlebitis Assessment No symptoms 10/22/24 1107   Infiltration Assessment 0 10/22/24 1107   Alcohol Cap Used No 10/22/24 1107   Dressing Status Clean, dry & intact 10/22/24 1107   Dressing Type Transparent 10/22/24 1107   Dressing Intervention New 10/22/24 1107        Opportunity for questions and clarification was provided.      Patient transported with:  Monitor and Registered Nurse  BP (!) 185/95   Pulse 86   Temp 98.5 °F (36.9 °C) (Oral)   Resp 16   Ht 1.524 m (5')   Wt 55.8 kg (123 lb 0.3 oz)   SpO2 95%   BMI 24.03 kg/m²

## 2024-10-22 NOTE — ED PROVIDER NOTES
[facility]  EMERGENCY DEPARTMENT ENCOUNTER      Pt Name: Mag Montiel  MRN: [id number]  Birthdate 1940  Date of evaluation: 10/22/2024  Provider: Elver French MD    CHIEF COMPLAINT       Chief Complaint   Patient presents with    Generalized Body Aches         HISTORY OF PRESENT ILLNESS   (Location/Symptom, Timing/Onset, Context/Setting, Quality, Duration, Modifying Factors, Severity)  Note limiting factors.   83-year-old with a history of hypertension, diabetes, hyperlipidemia, peripheral artery disease, vascular dementia.  She presents accompanied by her daughter (who provides the history and translates) with complaints of altered mental status.  Her daughter reports that the patient awoke yesterday feeling weak and tired.  The symptoms have persisted.  Today she awoke and was not able to stand to get out of bed.  She has required assistance to walk today which is not her baseline.  A family member noted her to be drooling earlier today while attempting to drink coffee.  Her daughter reports that she seems confused.  Her daughter reports that she has had difficult to control blood pressure elevation recently.  Her daughter reports that she was switched from carvedilol to labetalol and hydralazine recently.  Her daughter states that she has been having urinary frequency since switching to the hydralazine a few days ago.  She was noted to have a fever upon arrival here.  She has had a recent cough.          Review of External Medical Records:     Nursing Notes were reviewed.    REVIEW OF SYSTEMS    (2-9 systems for level 4, 10 or more for level 5)     Review of Systems    Except as noted above the remainder of the review of systems was reviewed and negative.       PAST MEDICAL HISTORY     Past Medical History:   Diagnosis Date    Diabetes (HCC) 2002    Diverticulitis of colon     Hearing loss 2011    Hypercholesterolemia     Hypertension     Osteopenia     Vascular dementia (HCC)   Number: SER10/10  Patient Name and age:  Mag Montiel 83 y.o.  female  Working Diagnosis: Fever/sepsis criteria    COVID-19 Suspicion: No  Sepsis present:  Yes  Reassessment needed: Yes  Code Status:  Full Code  Readmission: No  Isolation Requirements: no  Recommended Level of Care: telemetry  Department: [facility] - [phone]  83-year-old with multiple medical problems.  She has been weak and tired since yesterday.  Her daughter has noted some mental status changes.  She was febrile upon arrival to the ED at 101.1.  She has had cough and urinary frequency.  Her white blood cell count is 13,000.  IV fluids and cefepime.  No source found as of yet.    Total critical care time spent exclusive of procedures:  33 minutes.       DISPOSITION/PLAN   DISPOSITION        PATIENT REFERRED TO:  No follow-up provider specified.    DISCHARGE MEDICATIONS:  New Prescriptions    No medications on file         (Please note that portions of this note were completed with a voice recognition program.  Efforts were made to edit the dictations but occasionally words are mis-transcribed.)    Elver French MD (electronically signed)  Emergency Attending Physician / Physician Assistant / Nurse Practitioner    ED Sepsis Documentation (2024)  - Broad Spectrum Antibiotics ordered: Cefepime  - Repeat lactic acid: not indicated due to initial lactate < 2  - Sepsis re-assessment performed 10/22/24 at time 1300 and clinical condition not changed.  Recent Labs     10/22/24  1108 10/22/24  1132   LACACIDPL 1.1  --    CREATININE 1.32*  --    BILITOT 0.9  --      --    COVID19  --  Not detected   Organ dysfunction (Lactic acid >2, Cr>2, bili >2, INR>1.5, Plt<100, BiPap, intubated) exclusions if applicable: Other not applicable    - Hypotension or Lactic Acidosis present (SBP<90, MAP<65, Lactate>=4): NO  If YES:  - IVF: Not indicated due to septic shock not present Total volume: 1000 cc (cannot be 0)  - Persistent Hypotension despite

## 2024-10-22 NOTE — ED NOTES
The patient's daughter Irina Pat declines  services, stating that she works for [employer] as a certified . She further relates that since her mother is hard of hearing, the video and phone  services are not a good option for her mother.

## 2024-10-22 NOTE — ED TRIAGE NOTES
Patient developed generalized weakness with cough yesterday. This morning, she was increasingly weak with a fever and difficulty performing basic functions like putting in her hearing aides. Family report that the patient drooled when she drank coffee this morning. NIHSS 0 in triage. POC glucose 206. Pt with dementia and is hard of hearing.

## 2024-10-22 NOTE — ED NOTES
Patient positioned for comfort in stretcher with HOB elevated due to SPO2 reading 88% on room air. With pulse ox repositioned and HOB elevated SPO2 increased to 93% on room air. Patient tolerating po food/fluids and resting comfortably with lights dimmed. Family at bedside.

## 2024-10-22 NOTE — ED NOTES
Bedside report given to JADE Baldwin. Dr Bradley to bedside to speak with family who report that if the patient is stable enough to go home, they would prefer that option. Call bell at bedside. VSS.

## 2024-10-22 NOTE — ED NOTES
Patient is ambulatory to the restroom with 1 assist. Reports no pain. On auscultation RLL/LLL has fine crackles, no SOB at this time. Patient given incentive spirometer, able to perform 1250 at this time. Patient is sitting in recliner, family in the room.

## 2024-10-23 LAB
ANION GAP SERPL CALC-SCNC: 7 MMOL/L (ref 2–12)
B PERT DNA SPEC QL NAA+PROBE: NOT DETECTED
BASOPHILS # BLD: 0.1 K/UL (ref 0–0.1)
BASOPHILS NFR BLD: 0 % (ref 0–1)
BORDETELLA PARAPERTUSSIS BY PCR: NOT DETECTED
BUN SERPL-MCNC: 18 MG/DL (ref 6–20)
BUN/CREAT SERPL: 16 (ref 12–20)
C PNEUM DNA SPEC QL NAA+PROBE: NOT DETECTED
CALCIUM SERPL-MCNC: 9.4 MG/DL (ref 8.5–10.1)
CHLORIDE SERPL-SCNC: 106 MMOL/L (ref 97–108)
CO2 SERPL-SCNC: 24 MMOL/L (ref 21–32)
CREAT SERPL-MCNC: 1.14 MG/DL (ref 0.55–1.02)
DIFFERENTIAL METHOD BLD: ABNORMAL
EOSINOPHIL # BLD: 0 K/UL (ref 0–0.4)
EOSINOPHIL NFR BLD: 0 % (ref 0–7)
ERYTHROCYTE [DISTWIDTH] IN BLOOD BY AUTOMATED COUNT: 14.1 % (ref 11.5–14.5)
FLUAV SUBTYP SPEC NAA+PROBE: NOT DETECTED
FLUBV RNA SPEC QL NAA+PROBE: NOT DETECTED
GLUCOSE BLD STRIP.AUTO-MCNC: 159 MG/DL (ref 65–117)
GLUCOSE BLD STRIP.AUTO-MCNC: 216 MG/DL (ref 65–117)
GLUCOSE BLD STRIP.AUTO-MCNC: 217 MG/DL (ref 65–117)
GLUCOSE BLD STRIP.AUTO-MCNC: 271 MG/DL (ref 65–117)
GLUCOSE SERPL-MCNC: 168 MG/DL (ref 65–100)
HADV DNA SPEC QL NAA+PROBE: NOT DETECTED
HCOV 229E RNA SPEC QL NAA+PROBE: NOT DETECTED
HCOV HKU1 RNA SPEC QL NAA+PROBE: NOT DETECTED
HCOV NL63 RNA SPEC QL NAA+PROBE: NOT DETECTED
HCOV OC43 RNA SPEC QL NAA+PROBE: NOT DETECTED
HCT VFR BLD AUTO: 35.1 % (ref 35–47)
HGB BLD-MCNC: 11.5 G/DL (ref 11.5–16)
HMPV RNA SPEC QL NAA+PROBE: NOT DETECTED
HPIV1 RNA SPEC QL NAA+PROBE: NOT DETECTED
HPIV2 RNA SPEC QL NAA+PROBE: NOT DETECTED
HPIV3 RNA SPEC QL NAA+PROBE: NOT DETECTED
HPIV4 RNA SPEC QL NAA+PROBE: NOT DETECTED
IMM GRANULOCYTES # BLD AUTO: 0.1 K/UL (ref 0–0.04)
IMM GRANULOCYTES NFR BLD AUTO: 0 % (ref 0–0.5)
LYMPHOCYTES # BLD: 0.9 K/UL (ref 0.8–3.5)
LYMPHOCYTES NFR BLD: 6 % (ref 12–49)
M PNEUMO DNA SPEC QL NAA+PROBE: NOT DETECTED
MCH RBC QN AUTO: 30.3 PG (ref 26–34)
MCHC RBC AUTO-ENTMCNC: 32.8 G/DL (ref 30–36.5)
MCV RBC AUTO: 92.4 FL (ref 80–99)
MONOCYTES # BLD: 0.9 K/UL (ref 0–1)
MONOCYTES NFR BLD: 7 % (ref 5–13)
NEUTS SEG # BLD: 12.3 K/UL (ref 1.8–8)
NEUTS SEG NFR BLD: 87 % (ref 32–75)
NRBC # BLD: 0 K/UL (ref 0–0.01)
NRBC BLD-RTO: 0 PER 100 WBC
PLATELET # BLD AUTO: 277 K/UL (ref 150–400)
PMV BLD AUTO: 10 FL (ref 8.9–12.9)
POTASSIUM SERPL-SCNC: 3.3 MMOL/L (ref 3.5–5.1)
RBC # BLD AUTO: 3.8 M/UL (ref 3.8–5.2)
RSV RNA SPEC QL NAA+PROBE: NOT DETECTED
RV+EV RNA SPEC QL NAA+PROBE: NOT DETECTED
SARS-COV-2 RNA RESP QL NAA+PROBE: NOT DETECTED
SERVICE CMNT-IMP: ABNORMAL
SODIUM SERPL-SCNC: 137 MMOL/L (ref 136–145)
SPECIMEN HOLD: NORMAL
WBC # BLD AUTO: 14.3 K/UL (ref 3.6–11)

## 2024-10-23 PROCEDURE — 6370000000 HC RX 637 (ALT 250 FOR IP): Performed by: FAMILY MEDICINE

## 2024-10-23 PROCEDURE — 2580000003 HC RX 258: Performed by: FAMILY MEDICINE

## 2024-10-23 PROCEDURE — 6360000002 HC RX W HCPCS: Performed by: FAMILY MEDICINE

## 2024-10-23 PROCEDURE — 2500000003 HC RX 250 WO HCPCS: Performed by: FAMILY MEDICINE

## 2024-10-23 PROCEDURE — 80048 BASIC METABOLIC PNL TOTAL CA: CPT

## 2024-10-23 PROCEDURE — 97116 GAIT TRAINING THERAPY: CPT

## 2024-10-23 PROCEDURE — 97535 SELF CARE MNGMENT TRAINING: CPT

## 2024-10-23 PROCEDURE — 6370000000 HC RX 637 (ALT 250 FOR IP): Performed by: INTERNAL MEDICINE

## 2024-10-23 PROCEDURE — 2700000000 HC OXYGEN THERAPY PER DAY

## 2024-10-23 PROCEDURE — 6360000002 HC RX W HCPCS: Performed by: INTERNAL MEDICINE

## 2024-10-23 PROCEDURE — 87449 NOS EACH ORGANISM AG IA: CPT

## 2024-10-23 PROCEDURE — 85025 COMPLETE CBC W/AUTO DIFF WBC: CPT

## 2024-10-23 PROCEDURE — 2060000000 HC ICU INTERMEDIATE R&B

## 2024-10-23 PROCEDURE — 97161 PT EVAL LOW COMPLEX 20 MIN: CPT

## 2024-10-23 PROCEDURE — 82962 GLUCOSE BLOOD TEST: CPT

## 2024-10-23 PROCEDURE — 97165 OT EVAL LOW COMPLEX 30 MIN: CPT

## 2024-10-23 PROCEDURE — 94760 N-INVAS EAR/PLS OXIMETRY 1: CPT

## 2024-10-23 RX ORDER — AMLODIPINE BESYLATE 5 MG/1
5 TABLET ORAL DAILY
Status: DISCONTINUED | OUTPATIENT
Start: 2024-10-23 | End: 2024-10-26 | Stop reason: HOSPADM

## 2024-10-23 RX ORDER — METRONIDAZOLE 500 MG/100ML
500 INJECTION, SOLUTION INTRAVENOUS EVERY 8 HOURS
Status: DISCONTINUED | OUTPATIENT
Start: 2024-10-23 | End: 2024-10-25

## 2024-10-23 RX ADMIN — INSULIN LISPRO 2 UNITS: 100 INJECTION, SOLUTION INTRAVENOUS; SUBCUTANEOUS at 21:18

## 2024-10-23 RX ADMIN — SODIUM CHLORIDE, PRESERVATIVE FREE 10 ML: 5 INJECTION INTRAVENOUS at 11:09

## 2024-10-23 RX ADMIN — LOSARTAN POTASSIUM 100 MG: 50 TABLET, FILM COATED ORAL at 10:45

## 2024-10-23 RX ADMIN — METRONIDAZOLE 500 MG: 500 INJECTION, SOLUTION INTRAVENOUS at 17:52

## 2024-10-23 RX ADMIN — INSULIN LISPRO 1 UNITS: 100 INJECTION, SOLUTION INTRAVENOUS; SUBCUTANEOUS at 12:19

## 2024-10-23 RX ADMIN — ACETAMINOPHEN 650 MG: 325 TABLET ORAL at 12:09

## 2024-10-23 RX ADMIN — ASPIRIN 325 MG: 325 TABLET ORAL at 10:45

## 2024-10-23 RX ADMIN — DOXYCYCLINE 100 MG: 100 INJECTION, POWDER, LYOPHILIZED, FOR SOLUTION INTRAVENOUS at 10:56

## 2024-10-23 RX ADMIN — DONEPEZIL HYDROCHLORIDE 5 MG: 5 TABLET, FILM COATED ORAL at 10:45

## 2024-10-23 RX ADMIN — DOXYCYCLINE 100 MG: 100 INJECTION, POWDER, LYOPHILIZED, FOR SOLUTION INTRAVENOUS at 21:29

## 2024-10-23 RX ADMIN — ACETAMINOPHEN 650 MG: 325 TABLET ORAL at 03:51

## 2024-10-23 RX ADMIN — SERTRALINE HYDROCHLORIDE 100 MG: 50 TABLET ORAL at 10:45

## 2024-10-23 RX ADMIN — WATER 1000 MG: 1 INJECTION INTRAMUSCULAR; INTRAVENOUS; SUBCUTANEOUS at 10:44

## 2024-10-23 RX ADMIN — AMLODIPINE BESYLATE 5 MG: 5 TABLET ORAL at 15:43

## 2024-10-23 RX ADMIN — ACETAMINOPHEN 650 MG: 325 TABLET ORAL at 17:56

## 2024-10-23 RX ADMIN — ENOXAPARIN SODIUM 30 MG: 100 INJECTION SUBCUTANEOUS at 10:47

## 2024-10-23 RX ADMIN — POTASSIUM BICARBONATE 40 MEQ: 782 TABLET, EFFERVESCENT ORAL at 17:45

## 2024-10-23 RX ADMIN — INSULIN LISPRO 1 UNITS: 100 INJECTION, SOLUTION INTRAVENOUS; SUBCUTANEOUS at 17:45

## 2024-10-23 ASSESSMENT — PAIN SCALES - GENERAL
PAINLEVEL_OUTOF10: 0
PAINLEVEL_OUTOF10: 0

## 2024-10-23 NOTE — PLAN OF CARE
is needed turning from your back to your side while in a flat bed without using bedrails?: A Little  How much help is needed moving from lying on your back to sitting on the side of a flat bed without using bedrails?: A Little  How much help is needed moving to and from a bed to a chair?: A Little  How much help is needed standing up from a chair using your arms?: A Little  How much help is needed walking in hospital room?: A Little  How much help is needed climbing 3-5 steps with a railing?: A Lot    AM-PAC Inpatient Mobility Raw Score : 17  AM-PAC Inpatient T-Scale Score : 42.13     Cutoff score <=171,2,3 had higher odds of discharging home with home health or need of SNF/IPR.    1. Vonda Avila, Rosemarie Mccord, Mehul Hudson, Candy Vieira, Eilu Machuca, Ta Avila.  Validity of the AM-PAC “6-Clicks” Inpatient Daily Activity and Basic Mobility Short Forms. Physical Therapy Mar 2014, 94 3) 379-391; DOI: 10.2522/ptj.11715370  2. Arron HICKEY, Amira J, Susana J, Vern J. Association of AM-PAC \"6-Clicks\" Basic Mobility and Daily Activity Scores With Discharge Destination. Phys Ther. 2021 Apr 4;101(4):wnms373. doi: 10.1093/ptj/ythm428. PMID: 25677682.  3. Kin TOVAR, Jasmina D, Carin S, Toño K, Thuy S. Activity Measure for Post-Acute Care \"6-Clicks\" Basic Mobility Scores Predict Discharge Destination After Acute Care Hospitalization in Select Patient Groups: A Retrospective, Observational Study. Arch Rehabil Res Clin Transl. 2022 Jul 16;4(3):858624. doi: 10.1016/j.arrct.2022.208316. PMID: 34339545; PMCID: [id number].  4. Margarita PATEL, Lucina RABAGO, Ghanshyam W, Alejandra P. AM-PAC Short Forms Manual 4.0. Revised 2/2020.                                                                                                                                                                                                                               Pain Rating:  Does not report    Activity Tolerance:   Fair     After  treatment:   Patient left in no apparent distress sitting up in chair, Call bell within reach, Bed/ chair alarm activated, Caregiver / family present, Updated patient's board on functional status and mobility recommendations, and RN aware    COMMUNICATION/EDUCATION:   The patient's plan of care was discussed with: occupational therapist and registered nurse    Patient Education  Education Given To: Patient;Family  Education Provided: Plan of Care;Role of Therapy;Transfer Training;Fall Prevention Strategies;Energy Conservation;Precautions  Education Method: Demonstration;Verbal  Barriers to Learning: None;Cognition  Education Outcome: Verbalized understanding;Demonstrated understanding;Continued education needed (patient's daughter expressed understanding)    Thank you for this referral.  Aster Deras, PT  Minutes: 25      Physical Therapy Evaluation Charge Determination   History Examination Presentation Decision-Making   MEDIUM  Complexity : 1-2 comorbidities / personal factors will impact the outcome/ POC  LOW Complexity : 1-2 Standardized tests and measures addressing body structure, function, activity limitation and / or participation in recreation  LOW Complexity : Stable, uncomplicated  AM-PAC  LOW    Based on the above components, the patient evaluation is determined to be of the following complexity level: Low

## 2024-10-23 NOTE — PROGRESS NOTES
[facility]                                                                                          Hospitalist Progress Note  Sushma Madala, MD  Office Phone: [phone]        Date of Service:  10/23/2024  NAME:  Mag Montiel  :  1940  MRN:  [id number]       Admission Summary:   aMg Montiel is a 83 y.o. female with a pmhx DM II, HTN, PAD, dyslipidemia, past TIA, and vascular dementia who presents with progressive fatigue and weakness for the past two days, and is being admitted for sepsis due to pneumonia. Per patient's daughter at bedside, patient has poor short term memory at baseline, but has also been increasingly confused     In the ED, she was febrile to one 101.1, and hypertensive to 187/76.  Other vitals are stable.  Labs showed glucose 206, WBC 12.9, sodium 131, potassium 3.2, creatinine 1.32 (b/l 1), and Trop 54.  Influenza, COVID, and UA were negative.  Chest x-ray with no acute cardiopulmonary process, CTA consistent with microvascular disease, and CT thorax with level lower lobe pneumonia.     In the ED, she received cefepime, and IVF.       Interval history / Subjective:   Patient is seen and examined at bedside this AM. Both daughters present. Pt sitting in chair. AMS improving, oriented x 2. Spiking fevers  Discussed with nursing, cm      Assessment & Plan:      #Sepsis on poa due to Pneumonia  #Acute hypoxic resp failure - improved   # Acute metabolic encephalopathy - improving   - CT chest: Left lower lobe pneumonia.   -blood cx: NGTD. resp viral panel - neg. MRSA screen pending    - c/w IV abx ceftriaxone and doxycycline. Added IV flagyl.   -saturating well on RA (weaned off 2l )  - spiking fevers. Wbc trended up  - consider ID eval tomorrow if still febrile      #HTN  -BP elevated.   - continue home meds losartan, and labetalol  - dced hydralazine - pt c/w increased urinary frequency   - started on amlodipine ( checked with her  resources at the time of admission. Route is PO if not otherwise noted.      Admission Status:[id number]:::1}      Medications Reviewed:     Current Facility-Administered Medications   Medication Dose Route Frequency    amLODIPine (NORVASC) tablet 5 mg  5 mg Oral Daily    sodium chloride flush 0.9 % injection 5-40 mL  5-40 mL IntraVENous 2 times per day    sodium chloride flush 0.9 % injection 5-40 mL  5-40 mL IntraVENous PRN    0.9 % sodium chloride infusion   IntraVENous PRN    enoxaparin Sodium (LOVENOX) injection 30 mg  30 mg SubCUTAneous Daily    acetaminophen (TYLENOL) tablet 650 mg  650 mg Oral Q6H PRN    Or    acetaminophen (TYLENOL) suppository 650 mg  650 mg Rectal Q6H PRN    cefTRIAXone (ROCEPHIN) 1,000 mg in sterile water 10 mL IV syringe  1,000 mg IntraVENous Q24H    aspirin tablet 325 mg  325 mg Oral Daily    donepezil (ARICEPT) tablet 5 mg  5 mg Oral Daily    losartan (COZAAR) tablet 100 mg  100 mg Oral Daily    mirtazapine (REMERON) tablet 7.5 mg  7.5 mg Oral Nightly PRN    sertraline (ZOLOFT) tablet 100 mg  100 mg Oral Daily    glucose chewable tablet 16 g  4 tablet Oral PRN    dextrose bolus 10% 125 mL  125 mL IntraVENous PRN    Or    dextrose bolus 10% 250 mL  250 mL IntraVENous PRN    glucagon injection 1 mg  1 mg SubCUTAneous PRN    dextrose 10 % infusion   IntraVENous Continuous PRN    insulin lispro (HUMALOG,ADMELOG) injection vial 0-4 Units  0-4 Units SubCUTAneous 4x Daily AC & HS    doxycycline (VIBRAMYCIN) 100 mg in sodium chloride 0.9 % 100 mL IVPB (mini-bag)  100 mg IntraVENous Q12H     ______________________________________________________________________  EXPECTED LENGTH OF STAY: 3  ACTUAL LENGTH OF STAY:          1                 Sushma Madala, MD

## 2024-10-23 NOTE — H&P
History and Physical    Date of Service:  10/22/2024  Primary Care Provider: Ivana Tamayo MD  Source of information: patient, electronic medical record    Chief Complaint: Generalized Body Aches      History of Presenting Illness:   Mag Montiel is a 83 y.o. female with a pmhx DM II, HTN, PAD, dyslipidemia, past TIA, and vascular dementia who presents with progressive fatigue and weakness for the past two days, and is being admitted for sepsis due to pneumonia. Per patient's daughter at bedside, patient has poor short term memory at baseline, but has also been increasingly confused    In the ED, she was febrile to one 101.1, and hypertensive to 187/76.  Other vitals are stable.  Labs showed glucose 206, WBC 12.9, sodium 131, potassium 3.2, creatinine 1.32 (b/l 1), and Trop 54.  Influenza, COVID, and UA were negative.  Chest x-ray with no acute cardiopulmonary process, CTA consistent with microvascular disease, and CT thorax with level lower lobe pneumonia.    In the ED, she received cefepime, and IVF.       REVIEW OF SYSTEMS:  A comprehensive review of systems was negative except for that written in the History of Present Illness.     Past Medical History:   Diagnosis Date    Diabetes (HCC) 2002    Diverticulitis of colon     Hearing loss 2011    Hypercholesterolemia     Hypertension     Mini stroke     Osteopenia     Vascular dementia (HCC)       Past Surgical History:   Procedure Laterality Date    CHOLECYSTECTOMY      COLONOSCOPY  2012    small polyp    GI  2012    EGD gastric polyp    HEENT  5/2003    cat. ext. O.S. - bilateral     Prior to Admission medications    Medication Sig Start Date End Date Taking? Authorizing Provider   hydrALAZINE (APRESOLINE) 50 MG tablet Take 1 tablet by mouth in the morning and at bedtime 10/17/24  Yes ProviderGigi MD   meloxicam (MOBIC) 7.5 MG tablet Take 1 tablet by mouth as needed 9/25/24  Yes ProviderGigi MD   mirtazapine  TIA  #PAD  -continue home ASA, and  statin>crestor M and F,if still in hospital    #DM II  -ISS    #vascular dementia  -continue home aricept    #depression  -contnue home zoloft        DIET: No diet orders on file   ISOLATION PRECAUTIONS: No active isolations  CODE STATUS: Full code   DVT PROPHYLAXIS: Lovenox  ANTICIPATED DISCHARGE: 2-3 days  ANTICIPATED DISPOSITION: Home    Signed By: Fransisca Crump MD     October 22, 2024         Please note that this dictation may have been completed with Dragon, the [employer] voice recognition software.  Quite often unanticipated grammatical, syntax, homophones, and other interpretive errors are inadvertently transcribed by the computer software.  Please disregard these errors.  Please excuse any errors that have escaped final proofreading.

## 2024-10-24 ENCOUNTER — APPOINTMENT (OUTPATIENT)
Facility: HOSPITAL | Age: 84
End: 2024-10-24
Payer: MEDICARE

## 2024-10-24 PROBLEM — A41.9 SEPSIS WITHOUT ACUTE ORGAN DYSFUNCTION (HCC): Status: ACTIVE | Noted: 2024-10-24

## 2024-10-24 LAB
ANION GAP SERPL CALC-SCNC: 6 MMOL/L (ref 2–12)
BACTERIA SPEC CULT: NORMAL
BACTERIA SPEC CULT: NORMAL
BASOPHILS # BLD: 0 K/UL (ref 0–0.1)
BASOPHILS NFR BLD: 0 % (ref 0–1)
BUN SERPL-MCNC: 19 MG/DL (ref 6–20)
BUN/CREAT SERPL: 15 (ref 12–20)
CALCIUM SERPL-MCNC: 8.9 MG/DL (ref 8.5–10.1)
CHLORIDE SERPL-SCNC: 105 MMOL/L (ref 97–108)
CO2 SERPL-SCNC: 25 MMOL/L (ref 21–32)
CREAT SERPL-MCNC: 1.3 MG/DL (ref 0.55–1.02)
DIFFERENTIAL METHOD BLD: ABNORMAL
EOSINOPHIL # BLD: 0 K/UL (ref 0–0.4)
EOSINOPHIL NFR BLD: 0 % (ref 0–7)
ERYTHROCYTE [DISTWIDTH] IN BLOOD BY AUTOMATED COUNT: 14 % (ref 11.5–14.5)
GLUCOSE BLD STRIP.AUTO-MCNC: 247 MG/DL (ref 65–117)
GLUCOSE BLD STRIP.AUTO-MCNC: 250 MG/DL (ref 65–117)
GLUCOSE BLD STRIP.AUTO-MCNC: 262 MG/DL (ref 65–117)
GLUCOSE BLD STRIP.AUTO-MCNC: 307 MG/DL (ref 65–117)
GLUCOSE SERPL-MCNC: 252 MG/DL (ref 65–100)
HCT VFR BLD AUTO: 28.6 % (ref 35–47)
HGB BLD-MCNC: 9.8 G/DL (ref 11.5–16)
IMM GRANULOCYTES # BLD AUTO: 0.1 K/UL (ref 0–0.04)
IMM GRANULOCYTES NFR BLD AUTO: 1 % (ref 0–0.5)
LYMPHOCYTES # BLD: 0.6 K/UL (ref 0.8–3.5)
LYMPHOCYTES NFR BLD: 5 % (ref 12–49)
MCH RBC QN AUTO: 31.3 PG (ref 26–34)
MCHC RBC AUTO-ENTMCNC: 34.3 G/DL (ref 30–36.5)
MCV RBC AUTO: 91.4 FL (ref 80–99)
MONOCYTES # BLD: 0.9 K/UL (ref 0–1)
MONOCYTES NFR BLD: 7 % (ref 5–13)
NEUTS SEG # BLD: 11.2 K/UL (ref 1.8–8)
NEUTS SEG NFR BLD: 87 % (ref 32–75)
NRBC # BLD: 0 K/UL (ref 0–0.01)
NRBC BLD-RTO: 0 PER 100 WBC
PLATELET # BLD AUTO: 250 K/UL (ref 150–400)
PMV BLD AUTO: 10.2 FL (ref 8.9–12.9)
POTASSIUM SERPL-SCNC: 3.1 MMOL/L (ref 3.5–5.1)
PROCALCITONIN SERPL-MCNC: 1.31 NG/ML
RBC # BLD AUTO: 3.13 M/UL (ref 3.8–5.2)
RBC MORPH BLD: ABNORMAL
SERVICE CMNT-IMP: ABNORMAL
SERVICE CMNT-IMP: NORMAL
SODIUM SERPL-SCNC: 136 MMOL/L (ref 136–145)
WBC # BLD AUTO: 12.8 K/UL (ref 3.6–11)

## 2024-10-24 PROCEDURE — 2500000003 HC RX 250 WO HCPCS: Performed by: FAMILY MEDICINE

## 2024-10-24 PROCEDURE — 85025 COMPLETE CBC W/AUTO DIFF WBC: CPT

## 2024-10-24 PROCEDURE — 82962 GLUCOSE BLOOD TEST: CPT

## 2024-10-24 PROCEDURE — 80048 BASIC METABOLIC PNL TOTAL CA: CPT

## 2024-10-24 PROCEDURE — APPNB60 APP NON BILLABLE TIME 46-60 MINS

## 2024-10-24 PROCEDURE — 97530 THERAPEUTIC ACTIVITIES: CPT

## 2024-10-24 PROCEDURE — 2580000003 HC RX 258: Performed by: STUDENT IN AN ORGANIZED HEALTH CARE EDUCATION/TRAINING PROGRAM

## 2024-10-24 PROCEDURE — 6360000002 HC RX W HCPCS: Performed by: FAMILY MEDICINE

## 2024-10-24 PROCEDURE — 74177 CT ABD & PELVIS W/CONTRAST: CPT

## 2024-10-24 PROCEDURE — 6360000004 HC RX CONTRAST MEDICATION: Performed by: RADIOLOGY

## 2024-10-24 PROCEDURE — 6370000000 HC RX 637 (ALT 250 FOR IP): Performed by: STUDENT IN AN ORGANIZED HEALTH CARE EDUCATION/TRAINING PROGRAM

## 2024-10-24 PROCEDURE — 6360000002 HC RX W HCPCS: Performed by: INTERNAL MEDICINE

## 2024-10-24 PROCEDURE — 6370000000 HC RX 637 (ALT 250 FOR IP): Performed by: FAMILY MEDICINE

## 2024-10-24 PROCEDURE — 2580000003 HC RX 258: Performed by: FAMILY MEDICINE

## 2024-10-24 PROCEDURE — 2060000000 HC ICU INTERMEDIATE R&B

## 2024-10-24 PROCEDURE — 97116 GAIT TRAINING THERAPY: CPT

## 2024-10-24 PROCEDURE — 84145 PROCALCITONIN (PCT): CPT

## 2024-10-24 PROCEDURE — 2700000000 HC OXYGEN THERAPY PER DAY

## 2024-10-24 PROCEDURE — 6370000000 HC RX 637 (ALT 250 FOR IP): Performed by: INTERNAL MEDICINE

## 2024-10-24 PROCEDURE — 99222 1ST HOSP IP/OBS MODERATE 55: CPT

## 2024-10-24 PROCEDURE — 94760 N-INVAS EAR/PLS OXIMETRY 1: CPT

## 2024-10-24 PROCEDURE — 2500000003 HC RX 250 WO HCPCS: Performed by: STUDENT IN AN ORGANIZED HEALTH CARE EDUCATION/TRAINING PROGRAM

## 2024-10-24 RX ORDER — INSULIN GLARGINE 100 [IU]/ML
16 INJECTION, SOLUTION SUBCUTANEOUS DAILY
Status: DISCONTINUED | OUTPATIENT
Start: 2024-10-24 | End: 2024-10-26 | Stop reason: HOSPADM

## 2024-10-24 RX ORDER — IOPAMIDOL 755 MG/ML
100 INJECTION, SOLUTION INTRAVASCULAR
Status: COMPLETED | OUTPATIENT
Start: 2024-10-24 | End: 2024-10-24

## 2024-10-24 RX ORDER — POLYETHYLENE GLYCOL 3350 17 G/17G
17 POWDER, FOR SOLUTION ORAL DAILY PRN
Status: DISCONTINUED | OUTPATIENT
Start: 2024-10-24 | End: 2024-10-26 | Stop reason: HOSPADM

## 2024-10-24 RX ADMIN — INSULIN GLARGINE 16 UNITS: 100 INJECTION, SOLUTION SUBCUTANEOUS at 15:04

## 2024-10-24 RX ADMIN — INSULIN LISPRO 2 UNITS: 100 INJECTION, SOLUTION INTRAVENOUS; SUBCUTANEOUS at 10:37

## 2024-10-24 RX ADMIN — SODIUM CHLORIDE, PRESERVATIVE FREE 10 ML: 5 INJECTION INTRAVENOUS at 10:39

## 2024-10-24 RX ADMIN — WATER 1000 MG: 1 INJECTION INTRAMUSCULAR; INTRAVENOUS; SUBCUTANEOUS at 10:37

## 2024-10-24 RX ADMIN — METRONIDAZOLE 500 MG: 500 INJECTION, SOLUTION INTRAVENOUS at 17:29

## 2024-10-24 RX ADMIN — INSULIN LISPRO 2 UNITS: 100 INJECTION, SOLUTION INTRAVENOUS; SUBCUTANEOUS at 21:36

## 2024-10-24 RX ADMIN — DOXYCYCLINE 100 MG: 100 INJECTION, POWDER, LYOPHILIZED, FOR SOLUTION INTRAVENOUS at 21:50

## 2024-10-24 RX ADMIN — DONEPEZIL HYDROCHLORIDE 5 MG: 5 TABLET, FILM COATED ORAL at 10:38

## 2024-10-24 RX ADMIN — METRONIDAZOLE 500 MG: 500 INJECTION, SOLUTION INTRAVENOUS at 01:52

## 2024-10-24 RX ADMIN — DOXYCYCLINE 100 MG: 100 INJECTION, POWDER, LYOPHILIZED, FOR SOLUTION INTRAVENOUS at 10:37

## 2024-10-24 RX ADMIN — METRONIDAZOLE 500 MG: 500 INJECTION, SOLUTION INTRAVENOUS at 12:07

## 2024-10-24 RX ADMIN — SODIUM CHLORIDE: 9 INJECTION, SOLUTION INTRAVENOUS at 21:49

## 2024-10-24 RX ADMIN — IOPAMIDOL 100 ML: 755 INJECTION, SOLUTION INTRAVENOUS at 10:11

## 2024-10-24 RX ADMIN — INSULIN LISPRO 3 UNITS: 100 INJECTION, SOLUTION INTRAVENOUS; SUBCUTANEOUS at 17:50

## 2024-10-24 RX ADMIN — ASPIRIN 325 MG: 325 TABLET ORAL at 10:38

## 2024-10-24 RX ADMIN — POLYETHYLENE GLYCOL 3350 17 G: 17 POWDER, FOR SOLUTION ORAL at 14:34

## 2024-10-24 RX ADMIN — LOSARTAN POTASSIUM 100 MG: 50 TABLET, FILM COATED ORAL at 10:38

## 2024-10-24 RX ADMIN — POTASSIUM BICARBONATE 40 MEQ: 782 TABLET, EFFERVESCENT ORAL at 14:57

## 2024-10-24 RX ADMIN — ACETAMINOPHEN 650 MG: 325 TABLET ORAL at 02:49

## 2024-10-24 RX ADMIN — AMLODIPINE BESYLATE 5 MG: 5 TABLET ORAL at 10:38

## 2024-10-24 RX ADMIN — ENOXAPARIN SODIUM 30 MG: 100 INJECTION SUBCUTANEOUS at 10:37

## 2024-10-24 RX ADMIN — SERTRALINE HYDROCHLORIDE 100 MG: 50 TABLET ORAL at 10:38

## 2024-10-24 RX ADMIN — SODIUM CHLORIDE, PRESERVATIVE FREE 5 ML: 5 INJECTION INTRAVENOUS at 21:38

## 2024-10-24 RX ADMIN — ACETAMINOPHEN 650 MG: 325 TABLET ORAL at 21:38

## 2024-10-24 ASSESSMENT — PAIN SCALES - GENERAL
PAINLEVEL_OUTOF10: 0
PAINLEVEL_OUTOF10: 0

## 2024-10-24 NOTE — PLAN OF CARE
Problem: Occupational Therapy - Adult  Goal: By Discharge: Performs self-care activities at highest level of function for planned discharge setting.  See evaluation for individualized goals.  Description: FUNCTIONAL STATUS PRIOR TO ADMISSION:  Patient lives with family who assist as needed, though patient is typically independent with basic ADL. Patient has history of CVA and was receiving home health OT and PT   ADL Assistance: Needs assistance, Bath: Supervision, Dressing: Minimal assistance,  , Feeding: Stand by assistance, Toileting: Independent,  , Ambulation Assistance: Independent, Transfer Assistance: Independent, Active : No       Occupational Therapy Goals:  Initiated 10/23/2024  1.  Patient will perform grooming in stand with Stand by Assist within 7 day(s).  2.  Patient will perform bathing with Stand by Assist within 7 day(s).  3.  Patient will perform upper body dressing and lower body dressing with Stand by Assist within 7 day(s).  4.  Patient will perform toilet transfers with Stand by Assist  within 7 day(s).  5.  Patient will perform all aspects of toileting with Stand by Assist within 7 day(s).  Outcome: Progressing     OCCUPATIONAL THERAPY TREATMENT  Patient: Mag Montiel (83 y.o. female)  Date: 10/24/2024  Primary Diagnosis: SIRS (systemic inflammatory response syndrome) ([facility]) [R65.10]  Acute febrile illness [R50.9]  Sepsis due to pneumonia ([facility]) [J18.9, A41.9]  Sepsis without acute organ dysfunction, due to unspecified organism ([facility]) [A41.9]       Precautions: Fall Risk                Chart, occupational therapy assessment, plan of care, and goals were reviewed.     ASSESSMENT  Patient continues to benefit from skilled OT services and is progressing towards goals. Pt cleared for therapy by nursing and received supine in bed with daughter in room. Lunch in room and family asking for pt to get up to chair to eat. Overall CGA-Min A for bed mobility and transfer to chair with handheld

## 2024-10-24 NOTE — PLAN OF CARE
Problem: Physical Therapy - Adult  Goal: By Discharge: Performs mobility at highest level of function for planned discharge setting.  See evaluation for individualized goals.  Description: FUNCTIONAL STATUS PRIOR TO ADMISSION: Patient was independent with mobility prior to admission. She required min A for dressing per her daughter and supervision for bathing.     HOME SUPPORT PRIOR TO ADMISSION: The patient lived with her daughter. Per daughter, someone is typically at home with patient and is rarely left unsupervised.    Physical Therapy Goals  Initiated 10/23/2024  1.  Patient will move from supine to sit and sit to supine and roll side to side in bed with modified independence within 7 day(s).    2.  Patient will perform sit to stand with modified independence within 7 day(s).  3.  Patient will transfer from bed to chair and chair to bed with modified independence using the least restrictive device within 7 day(s).  4.  Patient will ambulate with supervision/set-up for 200 feet with the least restrictive device within 7 day(s).   5.  Patient will ascend/descend 4 stairs with handrail(s) with supervision/set-up within 7 day(s).    Outcome: Progressing   PHYSICAL THERAPY TREATMENT    Patient: Mag Montiel (83 y.o. female)  Date: 10/24/2024  Diagnosis: SIRS (systemic inflammatory response syndrome) ([facility]) [R65.10]  Acute febrile illness [R50.9]  Sepsis due to pneumonia (HCC) [J18.9, A41.9]  Sepsis without acute organ dysfunction, due to unspecified organism (HCC) [A41.9] SIRS (systemic inflammatory response syndrome) ([facility])      Precautions: Fall Risk                      ASSESSMENT:  Patient continues to benefit from skilled PT services and is progressing towards goals. Pt remains on track for discharge to home with family support. This session she amb to and from the bathroom and then amb 2 laps around the unit (about 400 feet) with contact guard and occasional min assist, no assistive device. Sp02 was stable on  Training  Sit to Stand: Contact-guard assistance  Stand to Sit: Contact-guard assistance  Balance:  Balance  Sitting: Intact  Standing: Impaired  Standing - Static: Constant support;Good  Standing - Dynamic: Constant support;Fair   Ambulation/Gait Training:     Gait  Overall Level of Assistance: Contact-guard assistance (occassional min assist)  Distance (ft): 400 Feet  Assistive Device: Gait belt  Interventions: Safety awareness training;Verbal cues  Speed/[place]: Slow (sped up with verbal cues)  Step Length: Left shortened;Right shortened  Gait Abnormalities: Decreased step clearance;Altered arm swing        Neuro Re-Education:                    Pain Rating:  None rated   Pain Intervention(s):       Activity Tolerance:   Good and SpO2 stable on room air    After treatment:   Patient left in no apparent distress sitting up in chair, Call bell within reach, and Caregiver / family present      COMMUNICATION/EDUCATION:   The patient's plan of care was discussed with: registered nurse and physician    Patient Education  Education Given To: Patient  Education Provided: Plan of Care;Role of Therapy;Transfer Training;Fall Prevention Strategies;Energy Conservation;Precautions  Education Method: Verbal  Barriers to Learning:  (language barrier, primary language is Czech and English is secondary)  Education Outcome: Verbalized understanding;Demonstrated understanding;Continued education needed      ALEXIS WATSON, PT  Minutes: 33

## 2024-10-24 NOTE — PROGRESS NOTES
[facility]  Hospitalist Group                                                                                          Hospitalist Progress Note  Roxanne Pierce MD  Office Phone: [phone]        Date of Service:  10/24/2024  NAME:  Mag Montiel  :  1940  MRN:  [id number]       Admission Summary:   Mag Montiel is a 83 y.o. female with a pmhx DM II, HTN, PAD, dyslipidemia, past TIA, and vascular dementia who presents with progressive fatigue and weakness for the past two days, and is being admitted for sepsis due to pneumonia. Per patient's daughter at bedside, patient has poor short term memory at baseline, but has also been increasingly confused     In the ED, she was febrile to one 101.1, and hypertensive to 187/76.  Other vitals are stable.  Labs showed glucose 206, WBC 12.9, sodium 131, potassium 3.2, creatinine 1.32 (b/l 1), and Trop 54.  Influenza, COVID, and UA were negative.  Chest x-ray with no acute cardiopulmonary process, CTA consistent with microvascular disease, and CT thorax with level lower lobe pneumonia.     In the ED, she received cefepime, and IVF.       Interval history / Subjective:   Patient is seen and examined at bedside this AM. Daughter present at bedside. Patient states she is feeling improved. Her breathing is improved, no SOB.     Discussed persistent fevers and ID consultation.      Assessment & Plan:      #Sepsis on poa due to Pneumonia  #Acute hypoxic resp failure - improved   # Acute metabolic encephalopathy - improving   - CT chest: Left lower lobe pneumonia.   -blood cx: NGTD. resp viral panel - neg. MRSA screen pending    - c/w IV abx ceftriaxone and doxycycline. Added IV flagyl.   -saturating well on RA (weaned off 2l )  - Tmax 103.1 overnight, procal increased to 1.3   - Consulted ID, recommended continued antibiotic course   - CT ab pelvis obtained with LLL pna as above, no other acute process noted      #HTN  -BP elevated.   - continue  250 ml   Net -250 ml        Physical Examination:     I had a face to face encounter with this patient and independently examined them on 10/24/2024 as outlined below:          General : alert x 2, awake, no acute distress, elderly   HEENT: PEERL, EOMI, moist mucus membrane  Neck: supple, no JVD, no meningeal signs  Chest: Clear to auscultation bilaterally   CVS: S1 S2 heard, Capillary refill less than 2 seconds  Abd: soft/ non tender, non distended, BS physiological,   Ext: no clubbing, no cyanosis, no edema, brisk 2+ DP pulses  Neuro/Psych: pleasant mood and affect, CN 2-12 grossly intact, sensory grossly within normal limit, Strength 5/5 in all extremities  Skin: warm     Data Review:    I personally reviewed  Image      I have personally and independently reviewed all pertinent labs, diagnostic studies, imaging, and have provided independent interpretation of the same.     Labs:     Recent Labs     10/23/24  1021 10/24/24  0625   WBC 14.3* 12.8*   HGB 11.5 9.8*   HCT 35.1 28.6*    250     Recent Labs     10/22/24  1108 10/23/24  1021 10/24/24  0625   * 137 136   K 3.2* 3.3* 3.1*   CL 96* 106 105   CO2 29 24 25   BUN 20 18 19     Recent Labs     10/22/24  1108   ALT 24   GLOB 3.9     No results for input(s): \"INR\", \"APTT\" in the last 72 hours.    Invalid input(s): \"PTP\"   No results for input(s): \"TIBC\" in the last 72 hours.    Invalid input(s): \"FE\", \"PSAT\", \"FERR\"   No results found for: \"RBCF\"   No results for input(s): \"PH\", \"PCO2\", \"PO2\" in the last 72 hours.  No results for input(s): \"CPK\" in the last 72 hours.    Invalid input(s): \"CPKMB\", \"CKNDX\", \"TROIQ\"  No results found for: \"CHOL\", \"CHLST\", \"CHOLV\", \"HDL\", \"HDLC\", \"LDL\", \"LDLC\"  No results found for: \"GLUCPOC\"  [unfilled]    Notes reviewed from all clinical/nonclinical/nursing services involved in patient's clinical care. Care coordination discussions were held with appropriate clinical/nonclinical/ nursing providers based on care

## 2024-10-24 NOTE — PLAN OF CARE
Problem: ABCDS Injury Assessment  Goal: Absence of physical injury  10/23/2024 2320 by Noiwan, [place], RN  Outcome: Progressing     Problem: Safety - Adult  Goal: Free from fall injury  10/24/2024 1234 by Amauri Alejandra RN  Outcome: Progressing  10/23/2024 2320 by Noiwan, [place], RN  Outcome: Progressing     Problem: Chronic Conditions and Co-morbidities  Goal: Patient's chronic conditions and co-morbidity symptoms are monitored and maintained or improved  10/24/2024 1234 by Amauri Alejandra RN  Outcome: Progressing  Flowsheets (Taken 10/24/2024 0930)  Care Plan - Patient's Chronic Conditions and Co-Morbidity Symptoms are Monitored and Maintained or Improved: Monitor and assess patient's chronic conditions and comorbid symptoms for stability, deterioration, or improvement  10/23/2024 2320 by Noiwan, [place], RN  Outcome: Progressing     Problem: Discharge Planning  Goal: Discharge to home or other facility with appropriate resources  10/24/2024 1234 by Amauri Alejandra RN  Outcome: Progressing  10/23/2024 2320 by Noiwan, [place], RN  Outcome: Progressing  Flowsheets (Taken 10/23/2024 1045 by Kiesha Mejia RN)  Discharge to home or other facility with appropriate resources:   Identify barriers to discharge with patient and caregiver   Identify discharge learning needs (meds, wound care, etc)     Problem: Physical Therapy - Adult  Goal: By Discharge: Performs mobility at highest level of function for planned discharge setting.  See evaluation for individualized goals.  Description: FUNCTIONAL STATUS PRIOR TO ADMISSION: Patient was independent with mobility prior to admission. She required min A for dressing per her daughter and supervision for bathing.     HOME SUPPORT PRIOR TO ADMISSION: The patient lived with her daughter. Per daughter, someone is typically at home with patient and is rarely left unsupervised.    Physical Therapy Goals  Initiated 10/23/2024  1.  Patient will move from supine to sit and sit

## 2024-10-24 NOTE — CONSULTS
Infectious Disease Consult    INFECTIOUS DISEASE Attending:     I agree with the above infectious disease daily progress note in its entirety as authored by and discussed in detail with the nurse practitioner.   I have reviewed pertinent laboratory studies, microbiology cultures, radiologic reports with review of the consultations and progress notes as appropriate.   I agree with today's subjective findings, physical examination, assessment and plan of care as described above and discussed extensively with the nurse practitioner.       Continue Ceftriaxone/Doxy/Flagyl, possible aspiration though more probable secondary bacterial PNA.    Increased Ceftriaxone dose to 2g IV q24hrs.    Although fevers have not improved to date, suspect that with additional time on current antibiotic therapy that patient should improve.    Note: fever to 103 not expected to resolve immediately - may have fevers to 100 to 101 overnight or perhaps 102.    A total time of 35 minutes was spent on today's encounter.  Greater than 50% of the time was spent on the following:  Preparing for visit and chart review.  Obtaining and/or reviewing separately obtained history  Performing a medically appropriate exam and/or evaluation  Counseling and educating a patient/family/caregiver as noted above  Placing relevant orders  Referring and communicating with other professionals (not separately reported)  Independently interpreting results (not separately reported) and communicating results to the patient/family/caregiver  Care coordination (not separately reported) as noted above  Documenting clinical information in the electronic health records (e.g. problem list, visit note) on the day of the encounter      Impression/Plan     83 y.o. female with past medical Hx of DM II, HTN, PAD, dyslipidemia, past TIA, and vascular dementia who was admitted for community-acquired pneumonia.     Community acquired pneumonia   Fever/Leukocytosis  Suspect  0.55 - 1.02 MG/DL    BUN/Creatinine Ratio 15 12 - 20      Est, Glom Filt Rate 40 (L) >60 ml/min/1.73m2    Calcium 8.9 8.5 - 10.1 MG/DL   POCT Glucose    Collection Time: 10/24/24  7:56 AM   Result Value Ref Range    POC Glucose 262 (H) 65 - 117 mg/dL    Performed by: Kerwin Garza    POCT Glucose    Collection Time: 10/24/24 11:00 AM   Result Value Ref Range    POC Glucose 247 (H) 65 - 117 mg/dL    Performed by: Kerwin Garza         Microbiology:  blood cx    Studies:  CT Result (most recent):  CT ABDOMEN PELVIS W IV CONTRAST 10/24/2024    Narrative  EXAM: CT ABDOMEN PELVIS W IV CONTRAST    INDICATION: high spiking fevers    COMPARISON: 10/22/2024    CONTRAST: 100 mL of Isovue-370.    ORAL CONTRAST: None    TECHNIQUE:  Following intravenous administration of contrast, thin axial images were  obtained through the abdomen and pelvis. Coronal and sagittal reconstructions  were generated. CT dose reduction was achieved through use of a standardized  protocol tailored for this examination and automatic exposure control for dose  modulation.    FINDINGS:  LOWER THORAX: Dense consolidation in the left lower lobe concerning for  pneumonia. Small parapneumonic effusion on the left.  LIVER: No mass.  BILIARY TREE: Cholecystectomy. CBD is not dilated.  SPLEEN: within normal limits.  PANCREAS: Atrophic pancreatic parenchyma with coarse calcifications and duct  dilation consistent with chronic pancreatitis.  ADRENALS: Unremarkable.  KIDNEYS: No mass, calculus, or hydronephrosis.  STOMACH: Unremarkable.  SMALL BOWEL: No dilatation or wall thickening.  COLON: Diverticulosis of the colon without diverticulitis.  APPENDIX: Normal  PERITONEUM: No ascites or pneumoperitoneum.  RETROPERITONEUM: No lymphadenopathy or aortic aneurysm.  REPRODUCTIVE ORGANS: Heterogeneous calcified uterus consistent with fibroids.  URINARY BLADDER: No mass or calculus.  BONES: No destructive bone lesion.  ABDOMINAL WALL: No mass or hernia.  ADDITIONAL

## 2024-10-25 LAB
ANION GAP SERPL CALC-SCNC: 6 MMOL/L (ref 2–12)
BASOPHILS # BLD: 0 K/UL (ref 0–0.1)
BASOPHILS NFR BLD: 0 % (ref 0–1)
BUN SERPL-MCNC: 23 MG/DL (ref 6–20)
BUN/CREAT SERPL: 18 (ref 12–20)
CALCIUM SERPL-MCNC: 8.8 MG/DL (ref 8.5–10.1)
CHLORIDE SERPL-SCNC: 104 MMOL/L (ref 97–108)
CO2 SERPL-SCNC: 27 MMOL/L (ref 21–32)
CREAT SERPL-MCNC: 1.3 MG/DL (ref 0.55–1.02)
DIFFERENTIAL METHOD BLD: ABNORMAL
EOSINOPHIL # BLD: 0 K/UL (ref 0–0.4)
EOSINOPHIL NFR BLD: 0 % (ref 0–7)
ERYTHROCYTE [DISTWIDTH] IN BLOOD BY AUTOMATED COUNT: 13.9 % (ref 11.5–14.5)
GLUCOSE BLD STRIP.AUTO-MCNC: 222 MG/DL (ref 65–117)
GLUCOSE BLD STRIP.AUTO-MCNC: 239 MG/DL (ref 65–117)
GLUCOSE BLD STRIP.AUTO-MCNC: 269 MG/DL (ref 65–117)
GLUCOSE BLD STRIP.AUTO-MCNC: 274 MG/DL (ref 65–117)
GLUCOSE SERPL-MCNC: 192 MG/DL (ref 65–100)
HCT VFR BLD AUTO: 28.6 % (ref 35–47)
HGB BLD-MCNC: 9.6 G/DL (ref 11.5–16)
IMM GRANULOCYTES # BLD AUTO: 0 K/UL (ref 0–0.04)
IMM GRANULOCYTES NFR BLD AUTO: 0 % (ref 0–0.5)
L PNEUMO1 AG UR QL IA: NEGATIVE
LYMPHOCYTES # BLD: 0.7 K/UL (ref 0.8–3.5)
LYMPHOCYTES NFR BLD: 6 % (ref 12–49)
MCH RBC QN AUTO: 30.4 PG (ref 26–34)
MCHC RBC AUTO-ENTMCNC: 33.6 G/DL (ref 30–36.5)
MCV RBC AUTO: 90.5 FL (ref 80–99)
MONOCYTES # BLD: 1.1 K/UL (ref 0–1)
MONOCYTES NFR BLD: 10 % (ref 5–13)
NEUTS SEG # BLD: 9.6 K/UL (ref 1.8–8)
NEUTS SEG NFR BLD: 83 % (ref 32–75)
NRBC # BLD: 0 K/UL (ref 0–0.01)
NRBC BLD-RTO: 0 PER 100 WBC
PLATELET # BLD AUTO: 270 K/UL (ref 150–400)
PMV BLD AUTO: 9.8 FL (ref 8.9–12.9)
POTASSIUM SERPL-SCNC: 3.7 MMOL/L (ref 3.5–5.1)
RBC # BLD AUTO: 3.16 M/UL (ref 3.8–5.2)
SERVICE CMNT-IMP: ABNORMAL
SODIUM SERPL-SCNC: 137 MMOL/L (ref 136–145)
SPECIMEN SOURCE: NORMAL
WBC # BLD AUTO: 11.6 K/UL (ref 3.6–11)

## 2024-10-25 PROCEDURE — 85025 COMPLETE CBC W/AUTO DIFF WBC: CPT

## 2024-10-25 PROCEDURE — 6370000000 HC RX 637 (ALT 250 FOR IP): Performed by: STUDENT IN AN ORGANIZED HEALTH CARE EDUCATION/TRAINING PROGRAM

## 2024-10-25 PROCEDURE — 6360000002 HC RX W HCPCS

## 2024-10-25 PROCEDURE — 2580000003 HC RX 258: Performed by: INTERNAL MEDICINE

## 2024-10-25 PROCEDURE — 99232 SBSQ HOSP IP/OBS MODERATE 35: CPT | Performed by: INTERNAL MEDICINE

## 2024-10-25 PROCEDURE — 6370000000 HC RX 637 (ALT 250 FOR IP): Performed by: FAMILY MEDICINE

## 2024-10-25 PROCEDURE — 2500000003 HC RX 250 WO HCPCS: Performed by: STUDENT IN AN ORGANIZED HEALTH CARE EDUCATION/TRAINING PROGRAM

## 2024-10-25 PROCEDURE — 2580000003 HC RX 258: Performed by: FAMILY MEDICINE

## 2024-10-25 PROCEDURE — 82962 GLUCOSE BLOOD TEST: CPT

## 2024-10-25 PROCEDURE — 2580000003 HC RX 258

## 2024-10-25 PROCEDURE — 2580000003 HC RX 258: Performed by: STUDENT IN AN ORGANIZED HEALTH CARE EDUCATION/TRAINING PROGRAM

## 2024-10-25 PROCEDURE — 6370000000 HC RX 637 (ALT 250 FOR IP): Performed by: INTERNAL MEDICINE

## 2024-10-25 PROCEDURE — 36415 COLL VENOUS BLD VENIPUNCTURE: CPT

## 2024-10-25 PROCEDURE — 80048 BASIC METABOLIC PNL TOTAL CA: CPT

## 2024-10-25 PROCEDURE — 6360000002 HC RX W HCPCS: Performed by: FAMILY MEDICINE

## 2024-10-25 PROCEDURE — 2060000000 HC ICU INTERMEDIATE R&B

## 2024-10-25 PROCEDURE — 6360000002 HC RX W HCPCS: Performed by: INTERNAL MEDICINE

## 2024-10-25 RX ADMIN — ASPIRIN 325 MG: 325 TABLET ORAL at 09:35

## 2024-10-25 RX ADMIN — ACETAMINOPHEN 650 MG: 325 TABLET ORAL at 23:11

## 2024-10-25 RX ADMIN — AMLODIPINE BESYLATE 5 MG: 5 TABLET ORAL at 09:34

## 2024-10-25 RX ADMIN — ENOXAPARIN SODIUM 30 MG: 100 INJECTION SUBCUTANEOUS at 09:34

## 2024-10-25 RX ADMIN — SODIUM CHLORIDE, PRESERVATIVE FREE 10 ML: 5 INJECTION INTRAVENOUS at 22:20

## 2024-10-25 RX ADMIN — WATER 2000 MG: 1 INJECTION INTRAMUSCULAR; INTRAVENOUS; SUBCUTANEOUS at 09:54

## 2024-10-25 RX ADMIN — METRONIDAZOLE 500 MG: 500 INJECTION, SOLUTION INTRAVENOUS at 02:26

## 2024-10-25 RX ADMIN — LOSARTAN POTASSIUM 100 MG: 50 TABLET, FILM COATED ORAL at 09:35

## 2024-10-25 RX ADMIN — INSULIN LISPRO 2 UNITS: 100 INJECTION, SOLUTION INTRAVENOUS; SUBCUTANEOUS at 13:13

## 2024-10-25 RX ADMIN — INSULIN GLARGINE 16 UNITS: 100 INJECTION, SOLUTION SUBCUTANEOUS at 09:33

## 2024-10-25 RX ADMIN — SODIUM CHLORIDE, PRESERVATIVE FREE 10 ML: 5 INJECTION INTRAVENOUS at 09:35

## 2024-10-25 RX ADMIN — INSULIN LISPRO 1 UNITS: 100 INJECTION, SOLUTION INTRAVENOUS; SUBCUTANEOUS at 17:28

## 2024-10-25 RX ADMIN — POTASSIUM BICARBONATE 40 MEQ: 782 TABLET, EFFERVESCENT ORAL at 15:14

## 2024-10-25 RX ADMIN — DONEPEZIL HYDROCHLORIDE 5 MG: 5 TABLET, FILM COATED ORAL at 09:35

## 2024-10-25 RX ADMIN — INSULIN LISPRO 2 UNITS: 100 INJECTION, SOLUTION INTRAVENOUS; SUBCUTANEOUS at 22:14

## 2024-10-25 RX ADMIN — SERTRALINE HYDROCHLORIDE 100 MG: 50 TABLET ORAL at 09:34

## 2024-10-25 RX ADMIN — INSULIN LISPRO 1 UNITS: 100 INJECTION, SOLUTION INTRAVENOUS; SUBCUTANEOUS at 09:54

## 2024-10-25 RX ADMIN — SODIUM CHLORIDE: 9 INJECTION, SOLUTION INTRAVENOUS at 02:26

## 2024-10-25 RX ADMIN — PIPERACILLIN AND TAZOBACTAM 3375 MG: 3; .375 INJECTION, POWDER, LYOPHILIZED, FOR SOLUTION INTRAVENOUS at 15:23

## 2024-10-25 RX ADMIN — DOXYCYCLINE 100 MG: 100 INJECTION, POWDER, LYOPHILIZED, FOR SOLUTION INTRAVENOUS at 22:15

## 2024-10-25 RX ADMIN — DOXYCYCLINE 100 MG: 100 INJECTION, POWDER, LYOPHILIZED, FOR SOLUTION INTRAVENOUS at 10:03

## 2024-10-25 ASSESSMENT — PAIN SCALES - WONG BAKER: WONGBAKER_NUMERICALRESPONSE: NO HURT

## 2024-10-25 ASSESSMENT — PAIN SCALES - GENERAL
PAINLEVEL_OUTOF10: 0
PAINLEVEL_OUTOF10: 0

## 2024-10-25 NOTE — PROGRESS NOTES
[facility]                                                                                          Hospitalist Progress Note  Roxanne Pierce MD  Office Phone: [phone]        Date of Service:  10/25/2024  NAME:  Mag Montiel  :  1940  MRN:  [id number]       Admission Summary:   Mag Montiel is a 83 y.o. female with a pmhx DM II, HTN, PAD, dyslipidemia, past TIA, and vascular dementia who presents with progressive fatigue and weakness for the past two days, and is being admitted for sepsis due to pneumonia. Per patient's daughter at bedside, patient has poor short term memory at baseline, but has also been increasingly confused     In the ED, she was febrile to one 101.1, and hypertensive to 187/76.  Other vitals are stable.  Labs showed glucose 206, WBC 12.9, sodium 131, potassium 3.2, creatinine 1.32 (b/l 1), and Trop 54.  Influenza, COVID, and UA were negative.  Chest x-ray with no acute cardiopulmonary process, CTA consistent with microvascular disease, and CT thorax with level lower lobe pneumonia.     In the ED, she received cefepime, and IVF.       Interval history / Subjective:   Patient is seen and examined at bedside this AM. Daughter present at bedside. Patient reports feeling improved, she is eager to walk around and continue to move. Discussed continued fever, will need to continue to monitor until improvement in fever curve.      Assessment & Plan:      #Sepsis on poa due to Pneumonia  #Acute hypoxic resp failure - improved   # Acute metabolic encephalopathy - improving   - CT chest: Left lower lobe pneumonia.   -blood cx: NGTD. resp viral panel - neg. MRSA screen pending    - s/p rocephin, doxycycline, flagyl   -saturating well on RA (weaned off 2l )  - Tmax 102.8 overnight, procal increased to 1.3   - Consulted ID, will transition to zosyn and doxycycline   - CT ab pelvis obtained with LLL pna as above, no other acute process noted   - Fever

## 2024-10-25 NOTE — PROGRESS NOTES
Infectious Disease Progress Note    Impression/Plan     83 y.o. female with past medical Hx of DM II, HTN, PAD, dyslipidemia, past TIA, and vascular dementia who was admitted for community-acquired pneumonia.     Community acquired pneumonia   Fever/Leukocytosis  Suspect viral  Blood Cx NGTD  MRSA nares negative    Stopped Ceftriaxone.    Start Zosyn.    Doxycycline to continue.    If doing OK over weekend, transition to Cefdinir 300mg PO BID and Doxycycline 100mg PO BID until 10/31/24, inclusive.    Pt, nurse, pt's family, Dr. Pierce         Anti-infectives:   Zosyn  Doxycycline    Subjective:   Date of Consultation:  October 25, 2024  Date of Admission: 10/22/2024   Referring Physician: Roxanne Pierce    Patient is a 83 y.o. female with past medical Hx of DM II, HTN, PAD, dyslipidemia, past TIA, and vascular dementia who was admitted for community-acquired pneumonia.     Presented to the ED for  increasing confusion, fatigue. ED workup with leukocytosis, fever 101.1, Respiratory virus panel negative, UA bland. CT imaging showed LLL pneumonia. Received one dose cefepime. Empirically started on IV doxycyline, ceftriaxone, flagyl IV. Blood culture remained  negative. Reports cough, chills. Denies sore throat, nasal congestion, trouble swallowing, diarrhea, nausea, vomiting. Endorses constipation. Family reports recent URI of 10 year old grandsons and that pneumonia had been going around at school. Leukocytosis improving but fever curve worsening with T max 103.1 today. CTAP showed dense consolidation of LLL. Infectious disease services was consulted to assist with management of increasing fever curve despite antibiotics, pneumonia.      Interval events:    Still febrile to 102.8 overnight, feels OK, tolerating some PO intake    Patient Active Problem List   Diagnosis    Hypertension    Diabetes mellitus (HCC)    Hypercholesteremia    PAD (peripheral artery disease) (HCC)    CONCHA (acute kidney injury) (HCC)    SIRS  MARTA JULIO C    INDICATION: Suspected infection, evaluate for lung process     COMPARISON: Chest radiograph performed the same day     CONTRAST: None.     TECHNIQUE:  5 mm axial images were obtained through the chest. Coronal and  sagittal reformats were generated.  CT dose reduction was achieved through use  of a standardized protocol tailored for this examination and automatic exposure  control for dose modulation.     The absence of intravenous contrast reduces the sensitivity for evaluation of  the mediastinum, rafael, vasculature, and upper abdominal organs.     FINDINGS:     CHEST WALL: No mass or axillary lymphadenopathy.  MEDIASTINUM: No mass or lymphadenopathy.  RAFAEL: No mass or lymphadenopathy.  THORACIC AORTA: No aneurysm.  MAIN PULMONARY ARTERY: Normal in caliber.  TRACHEA/BRONCHI: Patent.  ESOPHAGUS: No wall thickening or dilatation.  HEART: Top normal in size. Coronary artery calcium: present. Small pericardial  effusion.  PLEURA: No effusion or pneumothorax.  LUNGS: Left lower lobe consolidation.  INCIDENTALLY IMAGED UPPER ABDOMEN: Atrophied calcified pancreas suggestive of  sequelae of chronic pancreatitis.  BONES: No destructive bone lesion.     IMPRESSION:  Left lower lobe pneumonia. Recommend short-term follow-up in 2 to 3 months.  Sequela of chronic pancreatitis.     Electronically signed by BRIELLE CARRERA    Thank you for the opportunity to participate in the care of this patient.     A total time of 35 minutes was spent on today's encounter.  Greater than 50% of the time was spent on the following:  Preparing for visit and chart review.  Obtaining and/or reviewing separately obtained history  Performing a medically appropriate exam and/or evaluation  Counseling and educating a patient/family/caregiver as noted above  Placing relevant orders  Referring and communicating with other professionals (not separately reported)  Independently interpreting results (not separately reported) and communicating

## 2024-10-25 NOTE — CARE COORDINATION
Care Management Initial Assessment       RUR: 15%  Readmission? No  1st IM letter given? Yes - 10/22/24  1st  letter given: No      Patient admitted for body aches     Met with patient at bedside accompanied by daughter Mag. Patient speaks Slovenian, little English, Hard of hearing.      Residence: multi level home with entrance on 1st floor, bedrooms on 1st floor, 4 steps to enter  ADL: independent w/assistance with housework, shopping, preparing meals.  DME: cane, walker - does not use any equipment  Pharmacy:   PCP: Dr. Ivana Muñoz  Verified Insurance: Medicare A and B, [facility] supplement  Emergency Contacts: daughter Irina Pat [phone]  Previous rehab/services: currently receiving HHPT with [facility]  Transportation: family     IM following   ID following     10/25/24 1422   Service Assessment   Patient Orientation Alert and Oriented   Cognition Alert   History Provided By Child/Family  (Pepito Hathaway)   Support Systems Children   Patient's Healthcare Decision Maker is: Legal Next of Kin   PCP Verified by CM Yes  (Ivana Muñoz)   Last Visit to PCP Within last 3 months   Prior Functional Level Independent in ADLs/IADLs;Assistance with the following:;Cooking;Housework;Shopping   Current Functional Level Independent in ADLs/IADLs;Assistance with the following:;Cooking;Housework;Shopping   Can patient return to prior living arrangement Yes   Ability to make needs known: Good   Family able to assist with home care needs: Yes   Would you like for me to discuss the discharge plan with any other family members/significant others, and if so, who? Yes  (Irina Pat)   Financial Resources Medicare  ([facility] Supplemental)   Social/Functional History   Lives With Daughter   Type of Home House   Home Layout Two level   Home Access Stairs to enter with rails   Entrance Stairs - Number of Steps 4   Home Equipment Cane;Walker - Rolling   Receives Help From Family   ADL

## 2024-10-25 NOTE — PROGRESS NOTES
A Spiritual Care Partner Volunteer visited Mag GUY Montiel at [facility] in [facility] 3N TELEMETRY on 10/25/2024     Documented by: Chaplain Stacia Sanabria

## 2024-10-25 NOTE — PROGRESS NOTES
Chart checked, pt cleared by nursing. Attempted to work with pt but she declined citing being too tired from having already been up amb on the unit. PT will follow and see as able and appropriate. Nilsa Wharton, PT

## 2024-10-25 NOTE — PLAN OF CARE
Problem: ABCDS Injury Assessment  Goal: Absence of physical injury  Outcome: Progressing     Problem: Safety - Adult  Goal: Free from fall injury  Outcome: Progressing     Problem: Chronic Conditions and Co-morbidities  Goal: Patient's chronic conditions and co-morbidity symptoms are monitored and maintained or improved  Outcome: Progressing  Flowsheets  Taken 10/24/2024 2000 by Mag Hodgson RN  Care Plan - Patient's Chronic Conditions and Co-Morbidity Symptoms are Monitored and Maintained or Improved:   Monitor and assess patient's chronic conditions and comorbid symptoms for stability, deterioration, or improvement   Collaborate with multidisciplinary team to address chronic and comorbid conditions and prevent exacerbation or deterioration   Update acute care plan with appropriate goals if chronic or comorbid symptoms are exacerbated and prevent overall improvement and discharge  Taken 10/24/2024 1710 by Olivia Esqueda RN  Care Plan - Patient's Chronic Conditions and Co-Morbidity Symptoms are Monitored and Maintained or Improved: Monitor and assess patient's chronic conditions and comorbid symptoms for stability, deterioration, or improvement     Problem: Discharge Planning  Goal: Discharge to home or other facility with appropriate resources  Outcome: Progressing  Flowsheets  Taken 10/24/2024 2000 by Mag Hodgson RN  Discharge to home or other facility with appropriate resources:   Identify barriers to discharge with patient and caregiver   Arrange for needed discharge resources and transportation as appropriate   Identify discharge learning needs (meds, wound care, etc)   Refer to discharge planning if patient needs post-hospital services based on physician order or complex needs related to functional status, cognitive ability or social support system  Taken 10/24/2024 1710 by Olivia Esqueda RN  Discharge to home or other facility with appropriate resources: Identify barriers to discharge with

## 2024-10-26 VITALS
RESPIRATION RATE: 19 BRPM | DIASTOLIC BLOOD PRESSURE: 58 MMHG | SYSTOLIC BLOOD PRESSURE: 137 MMHG | HEART RATE: 67 BPM | OXYGEN SATURATION: 90 % | BODY MASS INDEX: 24.32 KG/M2 | WEIGHT: 123.9 LBS | HEIGHT: 60 IN | TEMPERATURE: 98.9 F

## 2024-10-26 PROBLEM — R65.10 SIRS (SYSTEMIC INFLAMMATORY RESPONSE SYNDROME) (HCC): Status: RESOLVED | Noted: 2024-10-22 | Resolved: 2024-10-26

## 2024-10-26 PROBLEM — A41.9 SEPSIS WITHOUT ACUTE ORGAN DYSFUNCTION (HCC): Status: RESOLVED | Noted: 2024-10-24 | Resolved: 2024-10-26

## 2024-10-26 LAB
ANION GAP SERPL CALC-SCNC: 6 MMOL/L (ref 2–12)
BASOPHILS # BLD: 0 K/UL (ref 0–0.1)
BASOPHILS NFR BLD: 0 % (ref 0–1)
BUN SERPL-MCNC: 20 MG/DL (ref 6–20)
BUN/CREAT SERPL: 17 (ref 12–20)
CALCIUM SERPL-MCNC: 8.6 MG/DL (ref 8.5–10.1)
CHLORIDE SERPL-SCNC: 108 MMOL/L (ref 97–108)
CO2 SERPL-SCNC: 27 MMOL/L (ref 21–32)
CREAT SERPL-MCNC: 1.21 MG/DL (ref 0.55–1.02)
DIFFERENTIAL METHOD BLD: ABNORMAL
EOSINOPHIL # BLD: 0.2 K/UL (ref 0–0.4)
EOSINOPHIL NFR BLD: 3 % (ref 0–7)
ERYTHROCYTE [DISTWIDTH] IN BLOOD BY AUTOMATED COUNT: 14.1 % (ref 11.5–14.5)
GLUCOSE BLD STRIP.AUTO-MCNC: 183 MG/DL (ref 65–117)
GLUCOSE SERPL-MCNC: 191 MG/DL (ref 65–100)
HCT VFR BLD AUTO: 29 % (ref 35–47)
HGB BLD-MCNC: 9.7 G/DL (ref 11.5–16)
IMM GRANULOCYTES # BLD AUTO: 0 K/UL (ref 0–0.04)
IMM GRANULOCYTES NFR BLD AUTO: 1 % (ref 0–0.5)
IRON SATN MFR SERPL: 10 % (ref 20–50)
IRON SERPL-MCNC: 18 UG/DL (ref 35–150)
LYMPHOCYTES # BLD: 1.1 K/UL (ref 0.8–3.5)
LYMPHOCYTES NFR BLD: 15 % (ref 12–49)
MCH RBC QN AUTO: 30.2 PG (ref 26–34)
MCHC RBC AUTO-ENTMCNC: 33.4 G/DL (ref 30–36.5)
MCV RBC AUTO: 90.3 FL (ref 80–99)
MONOCYTES # BLD: 0.7 K/UL (ref 0–1)
MONOCYTES NFR BLD: 9 % (ref 5–13)
NEUTS SEG # BLD: 5.7 K/UL (ref 1.8–8)
NEUTS SEG NFR BLD: 72 % (ref 32–75)
NRBC # BLD: 0 K/UL (ref 0–0.01)
NRBC BLD-RTO: 0 PER 100 WBC
PLATELET # BLD AUTO: 290 K/UL (ref 150–400)
PMV BLD AUTO: 9.5 FL (ref 8.9–12.9)
POTASSIUM SERPL-SCNC: 3.9 MMOL/L (ref 3.5–5.1)
RBC # BLD AUTO: 3.21 M/UL (ref 3.8–5.2)
SERVICE CMNT-IMP: ABNORMAL
SODIUM SERPL-SCNC: 141 MMOL/L (ref 136–145)
TIBC SERPL-MCNC: 183 UG/DL (ref 250–450)
WBC # BLD AUTO: 7.8 K/UL (ref 3.6–11)

## 2024-10-26 PROCEDURE — 82962 GLUCOSE BLOOD TEST: CPT

## 2024-10-26 PROCEDURE — 36415 COLL VENOUS BLD VENIPUNCTURE: CPT

## 2024-10-26 PROCEDURE — 85025 COMPLETE CBC W/AUTO DIFF WBC: CPT

## 2024-10-26 PROCEDURE — 80048 BASIC METABOLIC PNL TOTAL CA: CPT

## 2024-10-26 PROCEDURE — 6370000000 HC RX 637 (ALT 250 FOR IP): Performed by: INTERNAL MEDICINE

## 2024-10-26 PROCEDURE — 6360000002 HC RX W HCPCS: Performed by: INTERNAL MEDICINE

## 2024-10-26 PROCEDURE — 6360000002 HC RX W HCPCS: Performed by: FAMILY MEDICINE

## 2024-10-26 PROCEDURE — 6370000000 HC RX 637 (ALT 250 FOR IP): Performed by: FAMILY MEDICINE

## 2024-10-26 PROCEDURE — 83550 IRON BINDING TEST: CPT

## 2024-10-26 PROCEDURE — 6370000000 HC RX 637 (ALT 250 FOR IP): Performed by: STUDENT IN AN ORGANIZED HEALTH CARE EDUCATION/TRAINING PROGRAM

## 2024-10-26 PROCEDURE — 2580000003 HC RX 258: Performed by: INTERNAL MEDICINE

## 2024-10-26 PROCEDURE — 83540 ASSAY OF IRON: CPT

## 2024-10-26 PROCEDURE — 2580000003 HC RX 258: Performed by: FAMILY MEDICINE

## 2024-10-26 RX ORDER — DOXYCYCLINE HYCLATE 100 MG
100 TABLET ORAL 2 TIMES DAILY
Qty: 12 TABLET | Refills: 0 | Status: SHIPPED | OUTPATIENT
Start: 2024-10-26 | End: 2024-11-01

## 2024-10-26 RX ORDER — CEFDINIR 300 MG/1
300 CAPSULE ORAL DAILY
Qty: 6 CAPSULE | Refills: 0 | Status: SHIPPED | OUTPATIENT
Start: 2024-10-26 | End: 2024-11-01

## 2024-10-26 RX ORDER — AMLODIPINE BESYLATE 5 MG/1
5 TABLET ORAL DAILY
Qty: 30 TABLET | Refills: 0 | Status: SHIPPED | OUTPATIENT
Start: 2024-10-26

## 2024-10-26 RX ADMIN — AMLODIPINE BESYLATE 5 MG: 5 TABLET ORAL at 09:02

## 2024-10-26 RX ADMIN — SODIUM CHLORIDE, PRESERVATIVE FREE 10 ML: 5 INJECTION INTRAVENOUS at 09:05

## 2024-10-26 RX ADMIN — ASPIRIN 325 MG: 325 TABLET ORAL at 09:01

## 2024-10-26 RX ADMIN — INSULIN GLARGINE 16 UNITS: 100 INJECTION, SOLUTION SUBCUTANEOUS at 09:04

## 2024-10-26 RX ADMIN — DONEPEZIL HYDROCHLORIDE 5 MG: 5 TABLET, FILM COATED ORAL at 09:01

## 2024-10-26 RX ADMIN — ENOXAPARIN SODIUM 30 MG: 100 INJECTION SUBCUTANEOUS at 09:02

## 2024-10-26 RX ADMIN — SERTRALINE HYDROCHLORIDE 100 MG: 50 TABLET ORAL at 09:02

## 2024-10-26 RX ADMIN — INSULIN LISPRO 1 UNITS: 100 INJECTION, SOLUTION INTRAVENOUS; SUBCUTANEOUS at 09:03

## 2024-10-26 RX ADMIN — PIPERACILLIN AND TAZOBACTAM 3375 MG: 3; .375 INJECTION, POWDER, LYOPHILIZED, FOR SOLUTION INTRAVENOUS at 02:08

## 2024-10-26 RX ADMIN — LOSARTAN POTASSIUM 100 MG: 50 TABLET, FILM COATED ORAL at 09:01

## 2024-10-26 ASSESSMENT — PAIN SCALES - GENERAL: PAINLEVEL_OUTOF10: 0

## 2024-10-26 NOTE — DISCHARGE SUMMARY
Discharge Summary       PATIENT ID: Mag Montiel  MRN: [id number]   YOB: 1940    DATE OF ADMISSION: 10/22/2024 10:44 AM    DATE OF DISCHARGE: 10/26/24   PRIMARY CARE PROVIDER: Ivana Tamayo MD     ATTENDING PHYSICIAN: Cm Welsh MD  DISCHARGING PROVIDER: Cm Welsh MD    To contact this individual call [phone] and ask the  to page.  If unavailable ask to be transferred the Adult Hospitalist Department.    CONSULTATIONS: IP CONSULT TO HOSPITALIST  IP CONSULT TO INFECTIOUS DISEASES    PROCEDURES/SURGERIES: * No surgery found *    ADMITTING DIAGNOSES & HOSPITAL COURSE:   Mag Montiel is a 83 y.o. female with a pmhx DM II, HTN, PAD, dyslipidemia, past TIA, and vascular dementia who presents with progressive fatigue and weakness for the past two days, and is being admitted for sepsis due to pneumonia. Per patient's daughter at bedside, patient has poor short term memory at baseline, but has also been increasingly confused     In the ED, she was febrile to one 101.1, and hypertensive to 187/76.  Other vitals are stable.  Labs showed glucose 206, WBC 12.9, sodium 131, potassium 3.2, creatinine 1.32 (b/l 1), and Trop 54.  Influenza, COVID, and UA were negative.  Chest x-ray with no acute cardiopulmonary process, CTA consistent with microvascular disease, and CT thorax with level lower lobe pneumonia.     In the ED, she received cefepime, and IVF.      #sepsis, POA  #pneumonia  #hypoxia  -follow blood cx, sputum cx, resp viral panel, and MRSA nares  -abx>ceftriaxone and doxycycline  -spo2 91%  on RA, supplemental oxygen to maintain spo2>93%     #HTN  -continue home meds (losartah, hydralazinde, and labetolol     #dyslipidemia  #past TIA  #PAD  -continue home ASA, and  statin>crestor M and F,if still in hospital     #DM II  -ISS     #vascular dementia  -continue home aricept     #depression  -contnue home zoloft    DISCHARGE DIAGNOSES / PLAN:   membrane  Neck: supple, no JVD, no meningeal signs  Chest: Clear to auscultation bilaterally   CVS: S1 S2 heard, Capillary refill less than 2 seconds  Abd: soft/ Non tender, non distended, BS physiological,   Ext: no clubbing, no cyanosis, no edema, brisk 2+ DP pulses  Neuro/Psych: pleasant mood and affect, CN 2-12 grossly intact, sensory grossly within normal limit  Skin: warm     CHRONIC MEDICAL DIAGNOSES:      Greater than 31 minutes were spent with the patient on counseling and coordination of care    Signed:   Cm Welsh MD  10/26/2024  12:53 PM

## 2024-10-26 NOTE — DISCHARGE INSTRUCTIONS
Hydralazine was stopped duering hospitalization and amlodipine started. This is for blood pressure.    Recommend checking kidney function with her primary care doctor because if her kidney function improves, her antibiotic dose can be increased.

## 2024-10-27 LAB
BACTERIA SPEC CULT: NORMAL
BACTERIA SPEC CULT: NORMAL
SERVICE CMNT-IMP: NORMAL
SERVICE CMNT-IMP: NORMAL

## 2025-01-02 ENCOUNTER — OFFICE VISIT (OUTPATIENT)
Age: 85
End: 2025-01-02
Payer: MEDICARE

## 2025-01-02 VITALS
HEIGHT: 60 IN | BODY MASS INDEX: 24.15 KG/M2 | HEART RATE: 67 BPM | RESPIRATION RATE: 17 BRPM | WEIGHT: 123 LBS | OXYGEN SATURATION: 99 % | SYSTOLIC BLOOD PRESSURE: 128 MMHG | DIASTOLIC BLOOD PRESSURE: 74 MMHG

## 2025-01-02 DIAGNOSIS — F01.A3 MILD VASCULAR DEMENTIA WITH MOOD DISTURBANCE (HCC): Primary | ICD-10-CM

## 2025-01-02 DIAGNOSIS — Z87.898 HISTORY OF NEOPLASM: ICD-10-CM

## 2025-01-02 PROCEDURE — 1123F ACP DISCUSS/DSCN MKR DOCD: CPT

## 2025-01-02 PROCEDURE — 1036F TOBACCO NON-USER: CPT

## 2025-01-02 PROCEDURE — 1090F PRES/ABSN URINE INCON ASSESS: CPT

## 2025-01-02 PROCEDURE — 99215 OFFICE O/P EST HI 40 MIN: CPT

## 2025-01-02 PROCEDURE — G8420 CALC BMI NORM PARAMETERS: HCPCS

## 2025-01-02 PROCEDURE — 1159F MED LIST DOCD IN RCRD: CPT

## 2025-01-02 PROCEDURE — 1160F RVW MEDS BY RX/DR IN RCRD: CPT

## 2025-01-02 PROCEDURE — 1126F AMNT PAIN NOTED NONE PRSNT: CPT

## 2025-01-02 PROCEDURE — G8427 DOCREV CUR MEDS BY ELIG CLIN: HCPCS

## 2025-01-02 RX ORDER — DONEPEZIL HYDROCHLORIDE 10 MG/1
10 TABLET, FILM COATED ORAL DAILY
Qty: 90 TABLET | Refills: 1 | Status: SHIPPED | OUTPATIENT
Start: 2025-01-02 | End: 2025-07-01

## 2025-01-02 RX ORDER — DOXAZOSIN 4 MG/1
4 TABLET ORAL NIGHTLY
COMMUNITY

## 2025-01-02 ASSESSMENT — PATIENT HEALTH QUESTIONNAIRE - PHQ9
SUM OF ALL RESPONSES TO PHQ QUESTIONS 1-9: 0
2. FEELING DOWN, DEPRESSED OR HOPELESS: NOT AT ALL
1. LITTLE INTEREST OR PLEASURE IN DOING THINGS: NOT AT ALL
SUM OF ALL RESPONSES TO PHQ9 QUESTIONS 1 & 2: 0
SUM OF ALL RESPONSES TO PHQ QUESTIONS 1-9: 0

## 2025-01-02 ASSESSMENT — ENCOUNTER SYMPTOMS: PHOTOPHOBIA: 0

## 2025-01-02 NOTE — PROGRESS NOTES
Chief Complaint   Patient presents with    Follow-up     Mild vascular dementia.  Patient reports memory worsening      Vitals:    01/02/25 1047   BP: 128/74   Pulse: 67   Resp: 17   SpO2: 99%

## 2025-01-02 NOTE — PROGRESS NOTES
Chief Complaint   Patient presents with    Follow-up     Mild vascular dementia.  Patient reports memory worsening       HPI    Mrs Montiel is a 84 year old Kuwaiti speaking female here for FU. She is new to me this visit. Last seen by Dr Joseph on 10/8/24 for mild vascualr dementia. Here today with her daughter, who she lives with.    She reports that her Memory goes up and down. Especially with holidays and missing her . Sleeping good. Eating and drinking well. Mood is ok. Tolerating Aricept well no side effects.  She is Independent still with ADL. She enjoys to knit during the day. She helps with dinner. Currently doing OT during the week. No falls.             [place]  MMSE 25/29 (problems with time orientation, immediate recall and writing a sentence) was performed at her last visit. Patient did not tolerate the Exelon patch due to skin iritation. She was started on Aricept 5 mg. She was also started on Trazodone 50 mg to help with sleep.     Brain MRI without contrast done 11/13/2023 for dementia revealed generalized parenchymal volume loss with commensurate dilation of the sulci and ventricular system which has progressed since prior MRI done 6/24/2017.  There is also periventricular and deep white matter changes including the julissa consistent with chronic microvascular ischemic disease.  This has progressed when compared to 2017.  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           Review of Systems   Eyes:  Negative for photophobia and visual disturbance.   Musculoskeletal:  Negative for gait problem.   Neurological:  Negative for speech difficulty, weakness and headaches.   Psychiatric/Behavioral:  Positive for confusion. Negative for agitation, behavioral problems, hallucinations and sleep disturbance.          Past Medical History:   Diagnosis Date    Diabetes (HCC) 2002    Diverticulitis of colon     Hearing

## 2025-01-10 LAB — HBA1C MFR BLD HPLC: 7.3 %

## 2025-01-14 ENCOUNTER — OFFICE VISIT (OUTPATIENT)
Age: 85
End: 2025-01-14
Payer: MEDICARE

## 2025-01-14 VITALS
BODY MASS INDEX: 24.85 KG/M2 | HEIGHT: 60 IN | SYSTOLIC BLOOD PRESSURE: 154 MMHG | DIASTOLIC BLOOD PRESSURE: 72 MMHG | WEIGHT: 126.6 LBS | HEART RATE: 86 BPM

## 2025-01-14 DIAGNOSIS — I10 ESSENTIAL (PRIMARY) HYPERTENSION: ICD-10-CM

## 2025-01-14 DIAGNOSIS — E11.9 TYPE 2 DIABETES MELLITUS WITHOUT COMPLICATION, WITH LONG-TERM CURRENT USE OF INSULIN (HCC): Primary | ICD-10-CM

## 2025-01-14 DIAGNOSIS — E78.5 HYPERLIPIDEMIA LDL GOAL <100: ICD-10-CM

## 2025-01-14 DIAGNOSIS — Z79.4 TYPE 2 DIABETES MELLITUS WITHOUT COMPLICATION, WITH LONG-TERM CURRENT USE OF INSULIN (HCC): Primary | ICD-10-CM

## 2025-01-14 PROCEDURE — 1159F MED LIST DOCD IN RCRD: CPT | Performed by: INTERNAL MEDICINE

## 2025-01-14 PROCEDURE — 99214 OFFICE O/P EST MOD 30 MIN: CPT | Performed by: INTERNAL MEDICINE

## 2025-01-14 PROCEDURE — G8420 CALC BMI NORM PARAMETERS: HCPCS | Performed by: INTERNAL MEDICINE

## 2025-01-14 PROCEDURE — 1090F PRES/ABSN URINE INCON ASSESS: CPT | Performed by: INTERNAL MEDICINE

## 2025-01-14 PROCEDURE — G2211 COMPLEX E/M VISIT ADD ON: HCPCS | Performed by: INTERNAL MEDICINE

## 2025-01-14 PROCEDURE — G8427 DOCREV CUR MEDS BY ELIG CLIN: HCPCS | Performed by: INTERNAL MEDICINE

## 2025-01-14 PROCEDURE — 1036F TOBACCO NON-USER: CPT | Performed by: INTERNAL MEDICINE

## 2025-01-14 PROCEDURE — 1123F ACP DISCUSS/DSCN MKR DOCD: CPT | Performed by: INTERNAL MEDICINE

## 2025-01-14 PROCEDURE — 1125F AMNT PAIN NOTED PAIN PRSNT: CPT | Performed by: INTERNAL MEDICINE

## 2025-01-14 PROCEDURE — 1160F RVW MEDS BY RX/DR IN RCRD: CPT | Performed by: INTERNAL MEDICINE

## 2025-01-14 RX ORDER — DONEPEZIL HYDROCHLORIDE 5 MG/1
5 TABLET, FILM COATED ORAL NIGHTLY
COMMUNITY
Start: 2025-01-09

## 2025-01-14 RX ORDER — LOSARTAN POTASSIUM 100 MG/1
100 TABLET ORAL DAILY
COMMUNITY

## 2025-01-14 RX ORDER — METFORMIN HYDROCHLORIDE 500 MG/1
500 TABLET, EXTENDED RELEASE ORAL 2 TIMES DAILY
COMMUNITY
Start: 2025-01-09

## 2025-01-14 RX ORDER — DOXAZOSIN 2 MG/1
2 TABLET ORAL NIGHTLY
COMMUNITY

## 2025-01-14 NOTE — PATIENT INSTRUCTIONS
1) Balderas A1c esta 7.3% y prefiero que el nivel esta menos de 8%.  Sigue tomando lantus 16 unidades y metformin 1 tableta 2 veces al angelica.

## 2025-01-14 NOTE — PROGRESS NOTES
vitamin B-12 (CYANOCOBALAMIN) 1000 MCG tablet Take 1 tablet by mouth daily Three times weekly    Multiple Vitamin (MULTI-DAY) TABS Take 1 tablet by mouth daily     No current facility-administered medications for this visit.     No Known Allergies    Review of Systems: PER HPI    Physical Examination:  Blood pressure (!) 151/72, pulse 86, height 1.524 m (5'), weight 57.4 kg (126 lb 9.6 oz).  General: pleasant, no distress, good eye contact   Neck: no carotid bruits  Cardiovascular: regular, normal rate, nl s1 and s2, no m/r/g,   Respiratory: clear bilaterally  Integumentary: no edema,   Psychiatric: normal mood and affect    Data Reviewed: 1/9/25  - A1c 7.3%  - Hgb 10.2  - iron 31  - ferritin 22    Assessment/Plan:     1. Type 2 diabetes mellitus without complication, without long-term current use of insulin (HCC): Most recent Hgb A1c was 7.3% in 1/25 stable from 7/24 down from 7.4% in 4/24 down from 8.2% in 11/23 down from 9.1% in 8/23, up from 8.2% in 3/23 up from 7.7% in 9/22 up from 7.6% in 8/22 (1st visit with me) stable from 6/22 and up from 7.5% in 3/22.  Her goal is <8% so she is currently at goal and no changes are needed.  - cont lantus 16 units to the morning  - cont metformin  mg 1 tab bid  - cont freestyle maggie  - foot exam done 8/22  - microalbumin 64 in 6/22 on ramipril 2.5 mg and up to 89 in 8/23 and dose increased to 10 mg and 1376 in 7/24 and changed to losartan 100 mg in 8/24  - due for eye exam  - check Hgb A1c, cmp, and microalbumin prior to next visit        2. Essential hypertension, benign: her BP was above goal < 140/90 but just had cardura increased last week and readings are being sent to Dr. Levin so will defer to him  - cont cardura 6 mg at bedtime  - cont losartan 100 mg daily  - cont amlodipine 5 mg daily  - monitor home blood pressure readings        3. Hyperlipidemia LDL goal <100:  in 6/22 and started crestor 5 mg 3x/week in 8/22 and LDL down to 73 in 8/23 and 67 in

## 2025-02-26 RX ORDER — INSULIN GLARGINE 100 [IU]/ML
INJECTION, SOLUTION SUBCUTANEOUS
Qty: 30 ML | Refills: 3 | Status: SHIPPED | OUTPATIENT
Start: 2025-02-26

## 2025-05-02 LAB — HBA1C MFR BLD HPLC: 7.7 %

## 2025-06-23 DIAGNOSIS — E11.9 TYPE 2 DIABETES MELLITUS WITHOUT COMPLICATION, WITH LONG-TERM CURRENT USE OF INSULIN (HCC): ICD-10-CM

## 2025-06-23 DIAGNOSIS — E78.5 HYPERLIPIDEMIA LDL GOAL <100: ICD-10-CM

## 2025-06-23 DIAGNOSIS — I10 ESSENTIAL (PRIMARY) HYPERTENSION: ICD-10-CM

## 2025-06-23 DIAGNOSIS — Z79.4 TYPE 2 DIABETES MELLITUS WITHOUT COMPLICATION, WITH LONG-TERM CURRENT USE OF INSULIN (HCC): ICD-10-CM

## 2025-06-26 LAB
ALBUMIN SERPL-MCNC: 4.2 G/DL (ref 3.7–4.7)
ALBUMIN/CREAT UR: 300 MG/G CREAT (ref 0–29)
ALP SERPL-CCNC: 52 IU/L (ref 44–121)
ALT SERPL-CCNC: 9 IU/L (ref 0–32)
AST SERPL-CCNC: 11 IU/L (ref 0–40)
BILIRUB SERPL-MCNC: 0.6 MG/DL (ref 0–1.2)
BUN SERPL-MCNC: 20 MG/DL (ref 8–27)
BUN/CREAT SERPL: 19 (ref 12–28)
CALCIUM SERPL-MCNC: 9.8 MG/DL (ref 8.7–10.3)
CHLORIDE SERPL-SCNC: 101 MMOL/L (ref 96–106)
CHOLEST SERPL-MCNC: 134 MG/DL (ref 100–199)
CO2 SERPL-SCNC: 26 MMOL/L (ref 20–29)
CREAT SERPL-MCNC: 1.05 MG/DL (ref 0.57–1)
CREAT UR-MCNC: 97.2 MG/DL
EGFRCR SERPLBLD CKD-EPI 2021: 52 ML/MIN/1.73
GLOBULIN SER CALC-MCNC: 2.4 G/DL (ref 1.5–4.5)
GLUCOSE SERPL-MCNC: 138 MG/DL (ref 70–99)
HBA1C MFR BLD: 8.2 % (ref 4.8–5.6)
HDLC SERPL-MCNC: 59 MG/DL
IMP & REVIEW OF LAB RESULTS: NORMAL
LDLC SERPL CALC-MCNC: 62 MG/DL (ref 0–99)
Lab: NORMAL
MICROALBUMIN UR-MCNC: 291.4 UG/ML
POTASSIUM SERPL-SCNC: 4.3 MMOL/L (ref 3.5–5.2)
PROT SERPL-MCNC: 6.6 G/DL (ref 6–8.5)
REPORT: NORMAL
REPORT: NORMAL
SODIUM SERPL-SCNC: 142 MMOL/L (ref 134–144)
TRIGL SERPL-MCNC: 62 MG/DL (ref 0–149)
VLDLC SERPL CALC-MCNC: 13 MG/DL (ref 5–40)

## 2025-07-09 ENCOUNTER — OFFICE VISIT (OUTPATIENT)
Age: 85
End: 2025-07-09
Payer: MEDICARE

## 2025-07-09 VITALS
HEIGHT: 60 IN | BODY MASS INDEX: 24.35 KG/M2 | SYSTOLIC BLOOD PRESSURE: 114 MMHG | DIASTOLIC BLOOD PRESSURE: 59 MMHG | WEIGHT: 124 LBS | HEART RATE: 63 BPM

## 2025-07-09 DIAGNOSIS — E11.9 TYPE 2 DIABETES MELLITUS WITHOUT COMPLICATION, WITH LONG-TERM CURRENT USE OF INSULIN (HCC): Primary | ICD-10-CM

## 2025-07-09 DIAGNOSIS — I10 ESSENTIAL (PRIMARY) HYPERTENSION: ICD-10-CM

## 2025-07-09 DIAGNOSIS — E78.5 HYPERLIPIDEMIA LDL GOAL <100: ICD-10-CM

## 2025-07-09 DIAGNOSIS — Z79.4 TYPE 2 DIABETES MELLITUS WITHOUT COMPLICATION, WITH LONG-TERM CURRENT USE OF INSULIN (HCC): Primary | ICD-10-CM

## 2025-07-09 PROCEDURE — 3052F HG A1C>EQUAL 8.0%<EQUAL 9.0%: CPT | Performed by: INTERNAL MEDICINE

## 2025-07-09 PROCEDURE — 1123F ACP DISCUSS/DSCN MKR DOCD: CPT | Performed by: INTERNAL MEDICINE

## 2025-07-09 PROCEDURE — 1036F TOBACCO NON-USER: CPT | Performed by: INTERNAL MEDICINE

## 2025-07-09 PROCEDURE — 1090F PRES/ABSN URINE INCON ASSESS: CPT | Performed by: INTERNAL MEDICINE

## 2025-07-09 PROCEDURE — 1159F MED LIST DOCD IN RCRD: CPT | Performed by: INTERNAL MEDICINE

## 2025-07-09 PROCEDURE — G8420 CALC BMI NORM PARAMETERS: HCPCS | Performed by: INTERNAL MEDICINE

## 2025-07-09 PROCEDURE — 99214 OFFICE O/P EST MOD 30 MIN: CPT | Performed by: INTERNAL MEDICINE

## 2025-07-09 PROCEDURE — 1160F RVW MEDS BY RX/DR IN RCRD: CPT | Performed by: INTERNAL MEDICINE

## 2025-07-09 PROCEDURE — G2211 COMPLEX E/M VISIT ADD ON: HCPCS | Performed by: INTERNAL MEDICINE

## 2025-07-09 PROCEDURE — G8427 DOCREV CUR MEDS BY ELIG CLIN: HCPCS | Performed by: INTERNAL MEDICINE

## 2025-07-09 RX ORDER — METFORMIN HYDROCHLORIDE 500 MG/1
TABLET, EXTENDED RELEASE ORAL
Qty: 270 TABLET | Refills: 3 | Status: SHIPPED | OUTPATIENT
Start: 2025-07-09

## 2025-07-09 NOTE — PROGRESS NOTES
Chief Complaint   Patient presents with    Diabetes    Follow-up    Other     PCP and Pharmacy verified   Patient consents to ISA       History of Present Illness: Mag Montiel is a 84 y.o. female here for follow up of diabetes.  Weight down 2 lbs since last visit in 1/25.      History of Present Illness  The patient is an 84-year-old female here for a follow-up of diabetes. She is accompanied by her daughter, Gita Pat who assists with the visit.    The primary reason for this visit is to address fluctuating blood sugar levels. Her daughter reports that blood sugar levels have been fluctuating, with instances of hypoglycemia occurring several times a week. Blood sugar levels tend to rise during the day and drop to around 60 in the morning if they are under 200 at night. She takes her insulin in the morning.     The patient continues to use Dick for monitoring her blood sugar levels. Her diet has been good, including a significant amount of fruit. She engages in daily physical activity, including walking. Currently, she is on a regimen of Lantus insulin, administered at 12 units in the morning. She is also taking metformin twice daily, with the evening dose taken at dinner.    For blood pressure management, she is on losartan 100 mg, amlodipine 5 mg, and doxazosin 4 mg twice a day. For cholesterol management, she is on rosuvastatin 5 mg twice a week.    The patient has a glass of wine twice a week. She has lost more weight than the last time she messaged the doctor. Her memory is not great.    SOCIAL HISTORY  The patient drinks a tiny glass of wine twice a week.      Current Outpatient Medications   Medication Sig    Ferrous Sulfate (IRON PO) Take 1 tablet by mouth at bedtime    LANTUS SOLOSTAR 100 UNIT/ML injection pen Inject 10 units in the morning--Dose change 02/26/25--updated med list--did not send prescription to the pharmacy (Patient taking differently: 12 Units Inject 10 units in the morning--Dose

## 2025-07-09 NOTE — PATIENT INSTRUCTIONS
1) Increase metformin to 2 tabs with breakfast and 1 tab with dinner.    2) Decrease the lantus to 10 units in the morning.    3) Your blood pressure and cholesterol are well controlled.    4) Your creatinine (kidney test) is better.      5) Our office is located at:  [facility], Medical Office Building [address] (TWO), 3rd floor, [address]  [address] [address] [phone] (phone)

## 2025-08-11 RX ORDER — INSULIN GLARGINE 100 [IU]/ML
INJECTION, SOLUTION SUBCUTANEOUS
Qty: 30 ML | Refills: 3 | Status: SHIPPED | OUTPATIENT
Start: 2025-08-11